# Patient Record
Sex: FEMALE | Race: OTHER | Employment: UNEMPLOYED | URBAN - METROPOLITAN AREA
[De-identification: names, ages, dates, MRNs, and addresses within clinical notes are randomized per-mention and may not be internally consistent; named-entity substitution may affect disease eponyms.]

---

## 2017-05-22 ENCOUNTER — PATIENT OUTREACH (OUTPATIENT)
Dept: INTERNAL MEDICINE CLINIC | Age: 52
End: 2017-05-22

## 2017-05-22 ENCOUNTER — HOSPITAL ENCOUNTER (OUTPATIENT)
Dept: LAB | Age: 52
Discharge: HOME | End: 2017-05-22
Payer: MEDICARE

## 2017-05-22 ENCOUNTER — OFFICE VISIT (OUTPATIENT)
Dept: INTERNAL MEDICINE CLINIC | Age: 52
End: 2017-05-22

## 2017-05-22 VITALS
WEIGHT: 150 LBS | TEMPERATURE: 97.7 F | HEART RATE: 67 BPM | SYSTOLIC BLOOD PRESSURE: 134 MMHG | DIASTOLIC BLOOD PRESSURE: 84 MMHG | HEIGHT: 62 IN | OXYGEN SATURATION: 99 % | BODY MASS INDEX: 27.6 KG/M2 | RESPIRATION RATE: 18 BRPM

## 2017-05-22 DIAGNOSIS — R76.8 ANTI-TPO ANTIBODIES PRESENT: ICD-10-CM

## 2017-05-22 DIAGNOSIS — G43.909 MIGRAINE WITHOUT STATUS MIGRAINOSUS, NOT INTRACTABLE, UNSPECIFIED MIGRAINE TYPE: ICD-10-CM

## 2017-05-22 DIAGNOSIS — R73.9 HYPERGLYCEMIA: ICD-10-CM

## 2017-05-22 DIAGNOSIS — E55.9 VITAMIN D DEFICIENCY: ICD-10-CM

## 2017-05-22 DIAGNOSIS — E78.5 HYPERLIPIDEMIA, UNSPECIFIED HYPERLIPIDEMIA TYPE: ICD-10-CM

## 2017-05-22 DIAGNOSIS — E03.4 HYPOTHYROIDISM DUE TO ACQUIRED ATROPHY OF THYROID: ICD-10-CM

## 2017-05-22 DIAGNOSIS — Z01.419 ROUTINE GYNECOLOGICAL EXAMINATION: ICD-10-CM

## 2017-05-22 DIAGNOSIS — Z98.2 VP (VENTRICULOPERITONEAL) SHUNT STATUS: Primary | ICD-10-CM

## 2017-05-22 PROCEDURE — 82306 VITAMIN D 25 HYDROXY: CPT

## 2017-05-22 PROCEDURE — 84439 ASSAY OF FREE THYROXINE: CPT

## 2017-05-22 PROCEDURE — 83036 HEMOGLOBIN GLYCOSYLATED A1C: CPT

## 2017-05-22 PROCEDURE — 84443 ASSAY THYROID STIM HORMONE: CPT

## 2017-05-22 PROCEDURE — 80061 LIPID PANEL: CPT

## 2017-05-22 PROCEDURE — 80053 COMPREHEN METABOLIC PANEL: CPT

## 2017-05-22 PROCEDURE — 85025 COMPLETE CBC W/AUTO DIFF WBC: CPT

## 2017-05-22 NOTE — MR AVS SNAPSHOT
Visit Information Date & Time Provider Department Dept. Phone Encounter #  
 5/22/2017 10:30 AM MD [facility] [phone] [id number] Follow-up Instructions Return in about 6 months (around 11/22/2017), or if symptoms worsen or fail to improve, for blood pressure. Your Appointments 6/6/2017  2:00 PM  
PHYSICAL PRE OP with MD Kimber [facility] Appt Note: Complete Physical  
 [address] [facility] [address]  
[place] [address] [facility], [place], [facility] [address] Upcoming Health Maintenance Date Due  
 PAP AKA CERVICAL CYTOLOGY 1/1/2016 INFLUENZA AGE 9 TO ADULT 8/1/2017 DTaP/Tdap/Td series (2 - Td) 5/11/2021 Allergies as of 5/22/2017  Review Complete On: 5/22/2017 By: Luke Garcia MD  
  
 Severity Noted Reaction Type Reactions Pcn [Penicillins] High 05/11/2011    Unknown (comments) Pt does not remember the reaction and she has taken amoxil without any problems Sulfa (Sulfonamide Antibiotics)  08/16/2010    Other (comments) Not sure Current Immunizations  Reviewed on 4/30/2015 Name Date Influenza High Dose Vaccine PF 9/15/2016, 9/22/2015 Influenza Vaccine 10/15/2014 TDAP Vaccine 5/11/2011 Not reviewed this visit You Were Diagnosed With   
  
 Codes Comments  (ventriculoperitoneal) shunt status    -  Primary ICD-10-CM: Z98.2 ICD-9-CM: V45.2 Vitamin D deficiency     ICD-10-CM: E55.9 ICD-9-CM: 268.9 Anti-TPO antibodies present     ICD-10-CM: R76.8 ICD-9-CM: 795.79 Migraine without status migrainosus, not intractable, unspecified migraine type     ICD-10-CM: G43.909 ICD-9-CM: 346.90 Hyperglycemia     ICD-10-CM: R73.9 ICD-9-CM: 790.29 Hyperlipidemia, unspecified hyperlipidemia type     ICD-10-CM: E78.5 ICD-9-CM: 272.4 Hypothyroidism due to acquired atrophy of thyroid     ICD-10-CM: E03.4 ICD-9-CM: 244.8, 246.8 Routine gynecological examination     ICD-10-CM: Y66.166 ICD-9-CM: V72.31 Vitals BP Pulse Temp Resp Height(growth percentile) Weight(growth percentile) 134/84 67 97.7 °F (36.5 °C) (Oral) 18 5' 2\" (1.575 m) 150 lb (68 kg) SpO2 BMI OB Status Smoking Status 99% 27.44 kg/m2 Having regular periods Never Smoker Vitals History BMI and BSA Data Body Mass Index Body Surface Area  
 27.44 kg/m 2 1.72 m 2 Preferred Pharmacy Pharmacy Name Phone [facility] [address] [place] [address], [address] [place] [address] [phone] Your Updated Medication List  
  
   
This list is accurate as of: 5/22/17 11:35 AM.  Always use your most recent med list.  
  
  
  
  
 calcium carb-vit d2-minerals Tab Commonly known as:  CALTRATE PLUS Take 1 Tab by mouth two (2) times daily (with meals). levothyroxine 50 mcg tablet Commonly known as:  SYNTHROID Take 1 Tab by mouth Daily (before breakfast). meclizine 25 mg tablet Commonly known as:  ANTIVERT Take 1 Tab by mouth three (3) times daily as needed. MOTRIN 100 mg tablet Generic drug:  ibuprofen Take 100 mg by mouth every six (6) hours as needed. MULTI-VITAMIN PO Take  by mouth. VITAMIN C 250 mg tablet Generic drug:  ascorbic acid (vitamin C) Take  by mouth. We Performed the Following CBC WITH AUTOMATED DIFF [91700 CPT(R)] HEMOGLOBIN A1C WITH EAG [84172 CPT(R)] LIPID PANEL [90178 CPT(R)] METABOLIC PANEL, COMPREHENSIVE [93929 CPT(R)] REFERRAL TO GYNECOLOGY [REF30 Custom] Comments:  
 Please evaluate patient for GYN care. REFERRAL TO NEUROSURGERY [BSG25 Custom] Comments:  
 Please evaluate patient for hydrocephalus. T4, FREE K898168 CPT(R)] TSH 3RD GENERATION [25095 CPT(R)] VITAMIN D, 25 HYDROXY [id number] CPT(R)] Follow-up Instructions Return in about 6 months (around 11/22/2017), or if symptoms worsen or fail to improve, for blood pressure. Referral Information Referral ID Referred By Referred To  
  
 [id number] [facility] [address] [address] Phone: [phone] Fax: [phone] Visits Status Start Date End Date 1 New Request 5/22/17 5/22/18 If your referral has a status of pending review or denied, additional information will be sent to support the outcome of this decision. Referral ID Referred By Referred To  
 [id number] [address] Visits Status Start Date End Date 1 New Request 5/22/17 5/22/18 If your referral has a status of pending review or denied, additional information will be sent to support the outcome of this decision. Introducing [facility] & HEALTH SERVICES! Dear Jean Claude Foster: Thank you for requesting a Snapshot Interactive account. Our records indicate that you already have an active Snapshot Interactive account. You can access your account anytime at [email or web address] Did you know that you can access your hospital and ER discharge instructions at any time in Snapshot Interactive? You can also review all of your test results from your hospital stay or ER visit. Additional Information If you have questions, please visit the Frequently Asked Questions section of the Snapshot Interactive website at [email or web address] Remember, Snapshot Interactive is NOT to be used for urgent needs. For medical emergencies, dial 911. Now available from your iPhone and Android! Please provide this summary of care documentation to your next provider. Your primary care clinician is listed as [address] If you have any questions after today's visit, please call [phone].

## 2017-05-22 NOTE — PROGRESS NOTES
HISTORY OF PRESENT ILLNESS  Joe Murray is a 54 y.o. female. HPI  Presents for f/u hydrocephalus, thyroid, migraine    Pt reports that she needs to be referred to another neurosurgeon  Dr. Yashira Hernandez and Dr. Arias Zhou both have retired. +weight gain    Pt has been living at [facility], but other residents are elderly and pt apparently has been voicing her opinions and not getting along with staff  Pt reports interest in moving to a group home with younger residents    Pt has a counselor, but not a   Pt's mother is involved as well. Past medical, Social, and Family history reviewed  Medications reviewed and updated. ROS  Complete ROS reviewed and negative or stable except as noted in HPI. Physical Exam   Constitutional: She is oriented to person, place, and time. She appears well-nourished. No distress. Dysarthria    HENT:   Mouth/Throat: Oropharynx is clear and moist.   Shunt in place on right side, nontender   Eyes: EOM are normal. Pupils are equal, round, and reactive to light. No scleral icterus. Neck: Normal range of motion. Neck supple. No JVD present. No thyromegaly present. Right sided shunt palpable and nontender   Cardiovascular: Normal rate, regular rhythm and normal heart sounds. Exam reveals no gallop and no friction rub. No murmur heard. Pulmonary/Chest: Effort normal and breath sounds normal. No respiratory distress. She has no wheezes. She has no rales. Abdominal: Soft. Bowel sounds are normal. She exhibits no distension and no mass. There is no tenderness. There is no guarding. Musculoskeletal: Normal range of motion. She exhibits no edema. Lymphadenopathy:     She has no cervical adenopathy. Neurological: She is alert and oriented to person, place, and time. She exhibits normal muscle tone. Skin: Skin is warm. No rash noted. Psychiatric: She has a normal mood and affect. Nursing note and vitals reviewed. Prior labs reviewed.     ASSESSMENT and PLAN ICD-10-CM ICD-9-CM    1.  (ventriculoperitoneal) shunt status Z98.2 V45.2 REFERRAL TO NEUROSURGERY   2. Vitamin D deficiency E55.9 268.9 VITAMIN D, 25 HYDROXY      cholecalciferol (VITAMIN D3) 1,000 unit tablet   3. Anti-TPO antibodies present R76.8 795.79 T4, FREE      TSH 3RD GENERATION   4. Migraine without status migrainosus, not intractable, unspecified migraine type G43.909 346.90 CBC WITH AUTOMATED DIFF   5. Hyperglycemia R73.9 790.29 HEMOGLOBIN A1C WITH EAG   6. Hyperlipidemia, unspecified hyperlipidemia type E78.5 272.4 LIPID PANEL      METABOLIC PANEL, COMPREHENSIVE   7. Hypothyroidism due to acquired atrophy of thyroid E03.4 244.8 T4, FREE     246.8 TSH 3RD GENERATION   8. Routine gynecological examination Z01.419 V72.31 REFERRAL TO GYNECOLOGY     Follow-up Disposition:  Return in about 6 months (around 11/22/2017), or if symptoms worsen or fail to improve, for blood pressure. results and schedule of future studies reviewed with patient  reviewed diet, exercise and weight  cardiovascular risk and specific lipid/LDL goals reviewed  reviewed medications and side effects in detail   Check labs  Encouraged NS appt - given contact info  To have NN look into assisting pt with residence transition if needed.

## 2017-05-22 NOTE — PROGRESS NOTES
Room 14    Chief Complaint   Patient presents with    Referral / Consult     patient states that she needs to be referred to a new neurosurgeon for hydrocephaly

## 2017-05-23 LAB
25(OH)D3+25(OH)D2 SERPL-MCNC: 24.3 NG/ML (ref 30–100)
ALBUMIN SERPL-MCNC: 4.7 G/DL (ref 3.5–5.5)
ALBUMIN/GLOB SERPL: 1.5 {RATIO} (ref 1.2–2.2)
ALP SERPL-CCNC: 90 IU/L (ref 39–117)
ALT SERPL-CCNC: 17 IU/L (ref 0–32)
AST SERPL-CCNC: 13 IU/L (ref 0–40)
BASOPHILS # BLD AUTO: 0 X10E3/UL (ref 0–0.2)
BASOPHILS NFR BLD AUTO: 0 %
BILIRUB SERPL-MCNC: 0.4 MG/DL (ref 0–1.2)
BUN SERPL-MCNC: 11 MG/DL (ref 6–24)
BUN/CREAT SERPL: 19 (ref 9–23)
CALCIUM SERPL-MCNC: 9.8 MG/DL (ref 8.7–10.2)
CHLORIDE SERPL-SCNC: 99 MMOL/L (ref 96–106)
CHOLEST SERPL-MCNC: 242 MG/DL (ref 100–199)
CO2 SERPL-SCNC: 22 MMOL/L (ref 18–29)
CREAT SERPL-MCNC: 0.57 MG/DL (ref 0.57–1)
EOSINOPHIL # BLD AUTO: 0.2 X10E3/UL (ref 0–0.4)
EOSINOPHIL NFR BLD AUTO: 3 %
ERYTHROCYTE [DISTWIDTH] IN BLOOD BY AUTOMATED COUNT: 12.8 % (ref 12.3–15.4)
EST. AVERAGE GLUCOSE BLD GHB EST-MCNC: 100 MG/DL
GLOBULIN SER CALC-MCNC: 3.1 G/DL (ref 1.5–4.5)
GLUCOSE SERPL-MCNC: 75 MG/DL (ref 65–99)
HBA1C MFR BLD: 5.1 % (ref 4.8–5.6)
HCT VFR BLD AUTO: 41.1 % (ref 34–46.6)
HDLC SERPL-MCNC: 88 MG/DL
HGB BLD-MCNC: 13.6 G/DL (ref 11.1–15.9)
IMM GRANULOCYTES # BLD: 0 X10E3/UL (ref 0–0.1)
IMM GRANULOCYTES NFR BLD: 0 %
LDLC SERPL CALC-MCNC: 142 MG/DL (ref 0–99)
LYMPHOCYTES # BLD AUTO: 1.7 X10E3/UL (ref 0.7–3.1)
LYMPHOCYTES NFR BLD AUTO: 29 %
MCH RBC QN AUTO: 31.3 PG (ref 26.6–33)
MCHC RBC AUTO-ENTMCNC: 33.1 G/DL (ref 31.5–35.7)
MCV RBC AUTO: 95 FL (ref 79–97)
MONOCYTES # BLD AUTO: 0.4 X10E3/UL (ref 0.1–0.9)
MONOCYTES NFR BLD AUTO: 6 %
NEUTROPHILS # BLD AUTO: 3.6 X10E3/UL (ref 1.4–7)
NEUTROPHILS NFR BLD AUTO: 62 %
PLATELET # BLD AUTO: 377 X10E3/UL (ref 150–379)
POTASSIUM SERPL-SCNC: 4.3 MMOL/L (ref 3.5–5.2)
PROT SERPL-MCNC: 7.8 G/DL (ref 6–8.5)
RBC # BLD AUTO: 4.35 X10E6/UL (ref 3.77–5.28)
SODIUM SERPL-SCNC: 140 MMOL/L (ref 134–144)
T4 FREE SERPL-MCNC: 1.27 NG/DL (ref 0.82–1.77)
TRIGL SERPL-MCNC: 58 MG/DL (ref 0–149)
TSH SERPL DL<=0.005 MIU/L-ACNC: 4.24 UIU/ML (ref 0.45–4.5)
VLDLC SERPL CALC-MCNC: 12 MG/DL (ref 5–40)
WBC # BLD AUTO: 5.9 X10E3/UL (ref 3.4–10.8)

## 2017-05-23 RX ORDER — MELATONIN
1000 DAILY
Qty: 30 TAB | Refills: 11 | Status: SHIPPED | OUTPATIENT
Start: 2017-05-23 | End: 2018-08-22 | Stop reason: SDUPTHER

## 2017-06-06 ENCOUNTER — OFFICE VISIT (OUTPATIENT)
Dept: INTERNAL MEDICINE CLINIC | Age: 52
End: 2017-06-06

## 2017-06-06 VITALS
RESPIRATION RATE: 16 BRPM | HEART RATE: 74 BPM | TEMPERATURE: 97.7 F | BODY MASS INDEX: 27.57 KG/M2 | SYSTOLIC BLOOD PRESSURE: 125 MMHG | HEIGHT: 62 IN | OXYGEN SATURATION: 100 % | DIASTOLIC BLOOD PRESSURE: 74 MMHG | WEIGHT: 149.8 LBS

## 2017-06-06 DIAGNOSIS — Z00.00 ROUTINE GENERAL MEDICAL EXAMINATION AT A HEALTH CARE FACILITY: Primary | ICD-10-CM

## 2017-06-06 DIAGNOSIS — F32.A ANXIETY AND DEPRESSION: ICD-10-CM

## 2017-06-06 DIAGNOSIS — R39.15 URINARY URGENCY: ICD-10-CM

## 2017-06-06 DIAGNOSIS — Z12.31 ENCOUNTER FOR SCREENING MAMMOGRAM FOR BREAST CANCER: ICD-10-CM

## 2017-06-06 DIAGNOSIS — F41.9 ANXIETY AND DEPRESSION: ICD-10-CM

## 2017-06-06 DIAGNOSIS — E78.5 HYPERLIPIDEMIA, UNSPECIFIED HYPERLIPIDEMIA TYPE: ICD-10-CM

## 2017-06-06 DIAGNOSIS — Z13.39 SCREENING FOR ALCOHOLISM: ICD-10-CM

## 2017-06-06 DIAGNOSIS — E03.4 HYPOTHYROIDISM DUE TO ACQUIRED ATROPHY OF THYROID: ICD-10-CM

## 2017-06-06 DIAGNOSIS — R15.2 RECTAL URGENCY: ICD-10-CM

## 2017-06-06 DIAGNOSIS — Z98.2 VP (VENTRICULOPERITONEAL) SHUNT STATUS: ICD-10-CM

## 2017-06-06 DIAGNOSIS — E55.9 VITAMIN D DEFICIENCY: ICD-10-CM

## 2017-06-06 RX ORDER — ROSUVASTATIN CALCIUM 5 MG/1
5 TABLET, COATED ORAL
Qty: 30 TAB | Refills: 5 | Status: SHIPPED | OUTPATIENT
Start: 2017-06-06 | End: 2018-01-17 | Stop reason: SDUPTHER
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
## 2017-06-06 NOTE — PROGRESS NOTES
HISTORY OF PRESENT ILLNESS  Margy Soulier is a 54 y.o. female. HPI  Presents for f/u bowel, bladder concerns    Pt reports both bowel and bladder urgency  Frequent urinary incontinence  Wearing diaper/undergarment. Not constipated. Regular daily BMs - soft, formed consistency  Pt denies diarrhea  But, at times feels rectal urgency     Pt acknowledges some depressed mood  No SI      Past medical, Social, and Family history reviewed  Medications reviewed and updated. ROS  Complete ROS reviewed and negative or stable except as noted in HPI. Physical Exam   Constitutional: She is oriented to person, place, and time. She appears well-nourished. No distress. Dysarthria    HENT:   Mouth/Throat: Oropharynx is clear and moist.   Shunt in place on right side, nontender   Eyes: EOM are normal. Pupils are equal, round, and reactive to light. No scleral icterus. Neck: Normal range of motion. Neck supple. No JVD present. No thyromegaly present. Right sided shunt palpable and nontender   Cardiovascular: Normal rate, regular rhythm and normal heart sounds. Exam reveals no gallop and no friction rub. No murmur heard. Pulmonary/Chest: Effort normal and breath sounds normal. No respiratory distress. She has no wheezes. She has no rales. Abdominal: Soft. Bowel sounds are normal. She exhibits no distension and no mass. There is no tenderness. There is no rebound and no guarding. Musculoskeletal: Normal range of motion. She exhibits no edema. Lymphadenopathy:     She has no cervical adenopathy. Neurological: She is alert and oriented to person, place, and time. She exhibits normal muscle tone. Skin: Skin is warm. No rash noted. Psychiatric: She has a normal mood and affect. Nursing note and vitals reviewed. Prior labs reviewed. ASSESSMENT and PLAN  ?neurogenic vs pelvic floor dysfunction    ICD-10-CM ICD-9-CM    1. Rectal urgency R15.2 787.63    2.  Hyperlipidemia, unspecified hyperlipidemia type E78.5 272.4 rosuvastatin (CRESTOR) 5 mg tablet   3.  (ventriculoperitoneal) shunt status Z98.2 V45.2    4. Hypothyroidism due to acquired atrophy of thyroid E03.4 244.8      246.8    5. Vitamin D deficiency E55.9 268.9    6. Urinary urgency R39.15 788.63    7. Anxiety and depression F41.9 300.00     F32.9 311    8. Routine general medical examination at a health care facility Z00.00 V70.0    9. Screening for alcoholism Z13.89 V79.1    10. Encounter for screening mammogram for breast cancer Z12.31 V76.12 IKRT MAMMO BI SCREENING INCL CAD     Follow-up Disposition:  Return in about 2 months (around 8/6/2017), or if symptoms worsen or fail to improve, for thyroid, cholesterol.   results and schedule of future studies reviewed with patient  reviewed diet, exercise and weight    cardiovascular risk and specific lipid/LDL goals reviewed  reviewed medications and side effects in detail   See NS first  Consider pelvic, urogyn referral to Dr Dee Escalante  Pt unwilling to change diet - agrees to start low dose statin  Pt declines SSRI

## 2017-06-06 NOTE — MR AVS SNAPSHOT
Visit Information Date & Time Provider Department Dept. Phone Encounter #  
 6/6/2017  2:00 PM Thompson Chacon, [address] [place], [place] and Internal Medicine [phone] [id number] Follow-up Instructions Return in about 2 months (around 8/6/2017), or if symptoms worsen or fail to improve, for thyroid, cholesterol. Your Appointments 11/30/2017  9:30 AM  
ROUTINE CARE with Thompson Chacon MD  
[facility] Appt Note: blood pressure [address] [facility] [address]  
[place] [address] [place] [address] Upcoming Health Maintenance Date Due  
 PAP AKA CERVICAL CYTOLOGY 1/1/2016 INFLUENZA AGE 9 TO ADULT 8/1/2017 DTaP/Tdap/Td series (2 - Td) 5/11/2021 Allergies as of 6/6/2017  Review Complete On: 6/6/2017 By: Thompson Chacon MD  
  
 Severity Noted Reaction Type Reactions Pcn [Penicillins] High 05/11/2011    Unknown (comments) Pt does not remember the reaction and she has taken amoxil without any problems Sulfa (Sulfonamide Antibiotics)  08/16/2010    Other (comments) Not sure Current Immunizations  Reviewed on 6/6/2017 Name Date Influenza High Dose Vaccine PF 9/15/2016, 9/22/2015 Influenza Vaccine 10/15/2014 TDAP Vaccine 5/11/2011 Reviewed by Thompson Chacon MD on 6/6/2017 at  3:14 PM  
You Were Diagnosed With   
  
 Codes Comments Urinary urgency    -  Primary ICD-10-CM: R39.15 ICD-9-CM: 037.76 Hyperlipidemia, unspecified hyperlipidemia type     ICD-10-CM: E78.5 ICD-9-CM: 272.4  (ventriculoperitoneal) shunt status     ICD-10-CM: Z98.2 ICD-9-CM: V45.2 Hypothyroidism due to acquired atrophy of thyroid     ICD-10-CM: E03.4 ICD-9-CM: 244.8, 246.8 Vitamin D deficiency     ICD-10-CM: E55.9 ICD-9-CM: 268.9 Rectal urgency     ICD-10-CM: R15.2 ICD-9-CM: 787.63   
 Anxiety and depression     ICD-10-CM: F41.9, F32.9 ICD-9-CM: 300.00, 311 Routine general medical examination at a health care facility     ICD-10-CM: Z00.00 ICD-9-CM: V70.0 Screening for alcoholism     ICD-10-CM: Z13.89 ICD-9-CM: V79.1 Encounter for screening mammogram for breast cancer     ICD-10-CM: Z12.31 
ICD-9-CM: V76.12 Vitals BP Pulse Temp Resp Height(growth percentile) Weight(growth percentile) 125/74 74 97.7 °F (36.5 °C) (Oral) 16 5' 2\" (1.575 m) 149 lb 12.8 oz (67.9 kg) LMP SpO2 BMI OB Status Smoking Status (LMP Unknown) 100% 27.4 kg/m2 Having regular periods Never Smoker BMI and BSA Data Body Mass Index Body Surface Area  
 27.4 kg/m 2 1.72 m 2 Preferred Pharmacy Pharmacy Name Phone [facility] [address] [place] [address] [phone] Your Updated Medication List  
  
   
This list is accurate as of: 6/6/17  3:19 PM.  Always use your most recent med list.  
  
  
  
  
 calcium carb-vit d2-minerals Tab Commonly known as:  CALTRATE PLUS Take 1 Tab by mouth two (2) times daily (with meals). cholecalciferol 1,000 unit tablet Commonly known as:  VITAMIN D3 Take 1 Tab by mouth daily. levothyroxine 50 mcg tablet Commonly known as:  SYNTHROID Take 1 Tab by mouth Daily (before breakfast). meclizine 25 mg tablet Commonly known as:  ANTIVERT Take 1 Tab by mouth three (3) times daily as needed. MOTRIN 100 mg tablet Generic drug:  ibuprofen Take 100 mg by mouth every six (6) hours as needed. MULTI-VITAMIN PO Take  by mouth. rosuvastatin 5 mg tablet Commonly known as:  CRESTOR Take 1 Tab by mouth nightly. VITAMIN C 250 mg tablet Generic drug:  ascorbic acid (vitamin C) Take  by mouth. Prescriptions Sent to Pharmacy  Refills  
 rosuvastatin (CRESTOR) 5 mg tablet 5  
 Sig: Take 1 Tab by mouth nightly. Class: Normal  
 Pharmacy: [facility] [address], [address] [facility] [address] [facility] #: [phone] Route: Oral  
  
Follow-up Instructions Return in about 2 months (around 8/6/2017), or if symptoms worsen or fail to improve, for thyroid, cholesterol. To-Do List   
 06/13/2017 Imaging:  [facility] MAMMO BI SCREENING INCL CAD Patient Instructions Raina [address]bons[address] [address] What is a living will? A living will is a legal form you use to write down the kind of care you want at the end of your life. It is used by the health professionals who will treat you if you aren't able to decide for yourself. If you put your wishes in writing, your loved ones and others will know what kind of care you want. They won't need to guess. This can ease your mind and be helpful to others. A living will is not the same as an estate or property will. An estate will explains what you want to happen with your money and property after you die. Is a living will a legal document? A living will is a legal document. Each state has its own laws about living joe. If you move to another state, make sure that your living will is legal in the state where you now live. Or you might use a universal form that has been approved by many states. This kind of form can sometimes be completed and stored online. Your electronic copy will then be available wherever you have a connection to the Internet. In most cases, doctors will respect your wishes even if you have a form from a different state. · You don't need an  to complete a living will. But legal advice can be helpful if your state's laws are unclear, your health history is complicated, or your family can't agree on what should be in your living will. · You can change your living will at any time.  Some people find that their wishes about end-of-life care change as their health changes. · In addition to making a living will, think about completing a medical power of  form. This form lets you name the person you want to make end-of-life treatment decisions for you (your \"health care agent\") if you're not able to. Many hospitals and nursing homes will give you the forms you need to complete a living will and a medical power of . · Your living will is used only if you can't make or communicate decisions for yourself anymore. If you become able to make decisions again, you can accept or refuse any treatment, no matter what you wrote in your living will. · Your state may offer an online registry. This is a place where you can store your living will online so the doctors and nurses who need to treat you can find it right away. What should you think about when creating a living will? Talk about your end-of-life wishes with your family members and your doctor. Let them know what you want. That way the people making decisions for you won't be surprised by your choices. Think about these questions as you make your living will: · Do you know enough about life support methods that might be used? If not, talk to your doctor so you know what might be done if you can't breathe on your own, your heart stops, or you're unable to swallow. · What things would you still want to be able to do after you receive life-support methods? Would you want to be able to walk? To speak? To eat on your own? To live without the help of machines? · If you have a choice, where do you want to be cared for? In your home? At a hospital or nursing home? · Do you want certain Buddhist practices performed if you become very ill? · If you have a choice at the end of your life, where would you prefer to die? At home? In a hospital or nursing home? Somewhere else? · Would you prefer to be buried or cremated? · Do you want your organs to be donated after you die? What should you do with your living will? · Make sure that your family members and your health care agent have copies of your living will. · Give your doctor a copy of your living will to keep in your medical record. If you have more than one doctor, make sure that each one has a copy. · You may want to put a copy of your living will where it can be easily found. Where can you learn more? Go to http://carlos-jono.info/. Enter M009 in the search box to learn more about \"Learning About Living Perroy. \" Current as of: February 24, 2016 Content Version: 11.2 © 0651-6460 Pluto.[employer]. Care instructions adapted under license by [employer] (which disclaims liability or warranty for this information). If you have questions about a medical condition or this instruction, always ask your healthcare professional. [place] [address] any warranty or liability for your use of this information. Introducing [facility] & HEALTH SERVICES! Dear Abilio Cates: Thank you for requesting a Twillion account. Our records indicate that you already have an active Twillion account. You can access your account anytime at [email or web address] Did you know that you can access your hospital and ER discharge instructions at any time in Twillion? You can also review all of your test results from your hospital stay or ER visit. Additional Information If you have questions, please visit the Frequently Asked Questions section of the Twillion website at [email or web address] Remember, Twillion is NOT to be used for urgent needs. For medical emergencies, dial 911. Now available from your iPhone and Android! Please provide this summary of care documentation to your next provider. Your primary care clinician is listed as [address]  If you have any questions after today's visit, please call [phone].

## 2017-06-06 NOTE — PROGRESS NOTES
This is an Initial Medicare Annual Wellness Exam (AWV) (Performed 12 months after IPPE or effective date of Medicare Part B enrollment, Once in a lifetime)    I have reviewed the patient's medical history in detail and updated the computerized patient record. 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 Natacha Mayer Unspecified fetal growth retardation, 1,250-1,499 grams     Unspecified hearing loss     Vitamin D deficiency 2011     (ventriculoperitoneal) shunt status       Past Surgical History:   Procedure Laterality Date   [facility] GYN  [id number]    fibroid embolization    HX OTHER SURGICAL  947025     shunt insertion    HX OTHER SURGICAL  474695    shunt replacement    HX OTHER SURGICAL  512383    shunt revision    HX OTHER SURGICAL  865632    shunt replacement     Current Outpatient Prescriptions   Medication Sig Dispense Refill    rosuvastatin (CRESTOR) 5 mg tablet Take 1 Tab by mouth nightly. 30 Tab 5    levothyroxine (SYNTHROID) 50 mcg tablet Take 1 Tab by mouth Daily (before breakfast). 30 Tab 5    ascorbic acid (VITAMIN C) 250 mg tablet Take  by mouth.  MULTI-VITAMIN PO Take  by mouth.  meclizine (ANTIVERT) 25 mg tablet Take 1 Tab by mouth three (3) times daily as needed. 15 Tab 1    ibuprofen (MOTRIN) 100 mg tablet Take 100 mg by mouth every six (6) hours as needed.  cholecalciferol (VITAMIN D3) 1,000 unit tablet Take 1 Tab by mouth daily. 30 Tab 11    calcium carb-vit d2-minerals (CALTRATE PLUS) Tab Take 1 Tab by mouth two (2) times daily (with meals).  60 Tab 11     Allergies   Allergen Reactions    Pcn [Penicillins] Unknown (comments)     Pt does not remember the reaction and she has taken amoxil without any problems     Sulfa (Sulfonamide Antibiotics) Other (comments)     Not sure      Family History   Problem Relation Age of Onset    Heart Attack Father      Social History   Substance Use Topics    Smoking status: Never Smoker    Smokeless tobacco: Never Used    Alcohol use Not on file     Patient Active Problem List   Diagnosis Code    Dizziness R42     (ventriculoperitoneal) shunt status Z98.2    Unspecified hearing loss H91.90    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    Migraine G43.909    Vitamin D deficiency E55.9    Hypothyroidism E03.9    OCD (obsessive compulsive disorder) F42.9    Hyperlipidemia E78.5    Anxiety and depression F41.9, F32.9         Depression Risk Factor Screening:     PHQ over the last two weeks 6/6/2017   PHQ Not Done -   Little interest or pleasure in doing things Nearly every day   Feeling down, depressed or hopeless Nearly every day   Total Score PHQ 2 6     Pt declines treatment    Alcohol Risk Factor Screening: On any occasion during the past 3 months, have you had more than 3 drinks containing alcohol? No    Do you average more than 7 drinks per week? No    Functional Ability and Level of Safety:     Hearing Loss   moderate - but stable  Seeing audiology    Activities of Daily Living   Partial assistance. Requires assistance with: ambulation in open spaces due to imbalance    Fall Risk     Fall Risk Assessment, last 12 mths 6/6/2017   Able to walk? Yes   Fall in past 12 months? No     Abuse Screen   Patient is not abused    Review of Systems   A comprehensive review of systems was negative except for that written in the HPI.     See other    Physical Examination     No exam data present    Evaluation of Cognitive Function:  Mood/affect:  neutral  Appearance: age appropriate  Family member/caregiver input: NA    Visit Vitals    /74    Pulse 74    Temp 97.7 °F (36.5 °C) (Oral)    Resp 16    Ht 5' 2\" (1.575 m)  Comment: as per chart    Wt 149 lb 12.8 oz (67.9 kg)    LMP  (LMP Unknown)    SpO2 100%    BMI 27.4 kg/m2     General appearance: alert, cooperative, no distress, appears stated age  Lungs: clear to auscultation bilaterally  Heart: regular rate and rhythm, S1, S2 normal, no murmur, click, rub or gallop  Abdomen: soft, non-tender. Bowel sounds normal. No masses,  no organomegaly  Neurologic: Grossly normal, at baseline. Patient Care Team:  Yomaira Ojeda MD as PCP - General (Internal Medicine)  Anthony Reyes MD (Obstetrics & Gynecology)  Claudetta Keener, RN as Ambulatory Care Navigator    Advice/Referrals/Counseling   Education and counseling provided:  Are appropriate based on today's review and evaluation      Assessment/Plan       ICD-10-CM ICD-9-CM    1. Routine general medical examination at a health care facility Z00.00 V70.0    2. Hyperlipidemia, unspecified hyperlipidemia type E78.5 272.4 rosuvastatin (CRESTOR) 5 mg tablet   3.  (ventriculoperitoneal) shunt status Z98.2 V45.2    4. Hypothyroidism due to acquired atrophy of thyroid E03.4 244.8      246.8    5. Vitamin D deficiency E55.9 268.9    6. Urinary urgency R39.15 788.63    7. Rectal urgency R15.2 787.63    8. Anxiety and depression F41.9 300.00     F32.9 311    9. Screening for alcoholism Z13.89 V79.1    10. Encounter for screening mammogram for breast cancer Z12.31 V76.12 [facility] MAMMO BI SCREENING INCL CAD     Follow-up Disposition:  Return in about 2 months (around 8/6/2017), or if symptoms worsen or fail to improve, for thyroid, cholesterol. results and schedule of future studies reviewed with patient  reviewed diet, exercise and weight    cardiovascular risk and specific lipid/LDL goals reviewed  reviewed medications and side effects in detail.

## 2017-06-06 NOTE — PATIENT INSTRUCTIONS
Learning About Living Gonzalo  What is a living will? A living will is a legal form you use to write down the kind of care you want at the end of your life. It is used by the health professionals who will treat you if you aren't able to decide for yourself. If you put your wishes in writing, your loved ones and others will know what kind of care you want. They won't need to guess. This can ease your mind and be helpful to others. A living will is not the same as an estate or property will. An estate will explains what you want to happen with your money and property after you die. Is a living will a legal document? A living will is a legal document. Each state has its own laws about living joe. If you move to another state, make sure that your living will is legal in the state where you now live. Or you might use a universal form that has been approved by many states. This kind of form can sometimes be completed and stored online. Your electronic copy will then be available wherever you have a connection to the Internet. In most cases, doctors will respect your wishes even if you have a form from a different state. · You don't need an  to complete a living will. But legal advice can be helpful if your state's laws are unclear, your health history is complicated, or your family can't agree on what should be in your living will. · You can change your living will at any time. Some people find that their wishes about end-of-life care change as their health changes. · In addition to making a living will, think about completing a medical power of  form. This form lets you name the person you want to make end-of-life treatment decisions for you (your \"health care agent\") if you're not able to. Many hospitals and nursing homes will give you the forms you need to complete a living will and a medical power of .   · Your living will is used only if you can't make or communicate decisions for yourself anymore. If you become able to make decisions again, you can accept or refuse any treatment, no matter what you wrote in your living will. · Your state may offer an online registry. This is a place where you can store your living will online so the doctors and nurses who need to treat you can find it right away. What should you think about when creating a living will? Talk about your end-of-life wishes with your family members and your doctor. Let them know what you want. That way the people making decisions for you won't be surprised by your choices. Think about these questions as you make your living will:  · Do you know enough about life support methods that might be used? If not, talk to your doctor so you know what might be done if you can't breathe on your own, your heart stops, or you're unable to swallow. · What things would you still want to be able to do after you receive life-support methods? Would you want to be able to walk? To speak? To eat on your own? To live without the help of machines? · If you have a choice, where do you want to be cared for? In your home? At a hospital or nursing home? · Do you want certain Jainism practices performed if you become very ill? · If you have a choice at the end of your life, where would you prefer to die? At home? In a hospital or nursing home? Somewhere else? · Would you prefer to be buried or cremated? · Do you want your organs to be donated after you die? What should you do with your living will? · Make sure that your family members and your health care agent have copies of your living will. · Give your doctor a copy of your living will to keep in your medical record. If you have more than one doctor, make sure that each one has a copy. · You may want to put a copy of your living will where it can be easily found. Where can you learn more? Go to http://carlos-jono.info/.   Enter L497 in the search box to learn more about \"Learning About Living Perroy. \"  Current as of: February 24, 2016  Content Version: 11.2  © 6393-3274 [employer]. Care instructions adapted under license by [employer] (which disclaims liability or warranty for this information). If you have questions about a medical condition or this instruction, always ask your healthcare professional. [address] any warranty or liability for your use of this information.

## 2017-08-11 ENCOUNTER — HOSPITAL ENCOUNTER (OUTPATIENT)
Dept: LAB | Age: 52
Discharge: HOME | End: 2017-08-11
Payer: MEDICARE

## 2017-08-11 ENCOUNTER — OFFICE VISIT (OUTPATIENT)
Dept: INTERNAL MEDICINE CLINIC | Age: 52
End: 2017-08-11

## 2017-08-11 VITALS
WEIGHT: 148 LBS | DIASTOLIC BLOOD PRESSURE: 73 MMHG | BODY MASS INDEX: 27.23 KG/M2 | HEART RATE: 67 BPM | HEIGHT: 62 IN | SYSTOLIC BLOOD PRESSURE: 119 MMHG | RESPIRATION RATE: 16 BRPM | OXYGEN SATURATION: 99 % | TEMPERATURE: 97.6 F

## 2017-08-11 DIAGNOSIS — E78.5 HYPERLIPIDEMIA, UNSPECIFIED HYPERLIPIDEMIA TYPE: Primary | ICD-10-CM

## 2017-08-11 DIAGNOSIS — E55.9 VITAMIN D DEFICIENCY: ICD-10-CM

## 2017-08-11 DIAGNOSIS — G43.909 MIGRAINE WITHOUT STATUS MIGRAINOSUS, NOT INTRACTABLE, UNSPECIFIED MIGRAINE TYPE: ICD-10-CM

## 2017-08-11 DIAGNOSIS — J30.9 ALLERGIC RHINITIS, UNSPECIFIED ALLERGIC RHINITIS TRIGGER, UNSPECIFIED RHINITIS SEASONALITY: ICD-10-CM

## 2017-08-11 DIAGNOSIS — E03.4 HYPOTHYROIDISM DUE TO ACQUIRED ATROPHY OF THYROID: ICD-10-CM

## 2017-08-11 PROCEDURE — 80053 COMPREHEN METABOLIC PANEL: CPT

## 2017-08-11 PROCEDURE — 82306 VITAMIN D 25 HYDROXY: CPT

## 2017-08-11 PROCEDURE — 85025 COMPLETE CBC W/AUTO DIFF WBC: CPT

## 2017-08-11 PROCEDURE — 80061 LIPID PANEL: CPT

## 2017-08-11 PROCEDURE — 82550 ASSAY OF CK (CPK): CPT

## 2017-08-11 PROCEDURE — 84443 ASSAY THYROID STIM HORMONE: CPT

## 2017-08-11 PROCEDURE — 84439 ASSAY OF FREE THYROXINE: CPT

## 2017-08-11 NOTE — PROGRESS NOTES
HISTORY OF PRESENT ILLNESS  Kimi Mccormack is a 54 y.o. female. HPI  Presents for f/u thyroid, cholesterol    Pt tolerating crestor    Not taking thyroid dose daily - at least every other day    Not taking calcium or vit D    Pt using Chang's vapor tx for upper resp congestion, runny nose    Past medical, Social, and Family history reviewed  Medications reviewed and updated. ROS  Complete ROS reviewed and negative or stable except as noted in HPI. Physical Exam   Constitutional: She is oriented to person, place, and time. She appears well-nourished. No distress. Dysarthria    HENT:   Mouth/Throat: Oropharynx is clear and moist.   Shunt in place on right side, nontender   Eyes: EOM are normal. Pupils are equal, round, and reactive to light. No scleral icterus. Neck: Normal range of motion. Neck supple. No JVD present. No thyromegaly present. Right sided shunt palpable and nontender   Cardiovascular: Normal rate, regular rhythm and normal heart sounds. Exam reveals no gallop and no friction rub. No murmur heard. Pulmonary/Chest: Effort normal and breath sounds normal. No respiratory distress. She has no wheezes. She has no rales. Abdominal: Soft. Bowel sounds are normal. She exhibits no distension. There is no tenderness. Musculoskeletal: Normal range of motion. She exhibits no edema. Lymphadenopathy:     She has no cervical adenopathy. Neurological: She is alert and oriented to person, place, and time. She exhibits normal muscle tone. Skin: Skin is warm. No rash noted. Psychiatric: She has a normal mood and affect. Nursing note and vitals reviewed. Prior labs reviewed. ASSESSMENT and PLAN    ICD-10-CM ICD-9-CM    1. Hyperlipidemia, unspecified hyperlipidemia type E78.5 272.4 CBC WITH AUTOMATED DIFF      CK      LIPID PANEL      METABOLIC PANEL, COMPREHENSIVE   2.  Hypothyroidism due to acquired atrophy of thyroid E03.4 244.8 TSH 3RD GENERATION     246.8 T4, FREE   3. Vitamin D deficiency E55.9 268.9 VITAMIN D, 25 HYDROXY   4. Migraine without status migrainosus, not intractable, unspecified migraine type G43.909 346.90    5. Allergic rhinitis, unspecified allergic rhinitis trigger, unspecified rhinitis seasonality J30.9 477.9      Follow-up Disposition:  Return in about 6 months (around 2/11/2018), or if symptoms worsen or fail to improve, for thyroid, cholesterol. results and schedule of future studies reviewed with patient  reviewed diet, exercise and weight   cardiovascular risk and specific lipid/LDL goals reviewed  reviewed medications and side effects in detail   Recheck labs  Continue current medications, adjust as indicated  Pt declines claritin  GYN appt 8/23/17 scheduled for PAP    Assessment and plan reviewed with pt and questions answered and pt expressed understanding.

## 2017-08-11 NOTE — PROGRESS NOTES
Rm 15    Chief Complaint   Patient presents with    Follow-up     2months thyroid and cholesterol     1. Have you been to the ER, urgent care clinic since your last visit? Hospitalized since your last visit? No    2. Have you seen or consulted any other health care providers outside of the [address] [place] [address] since your last visit? Include any pap smears or colon screening.  No    Health Maintenance Due   Topic Date Due    PAP AKA CERVICAL CYTOLOGY  01/01/2016    INFLUENZA AGE 9 TO ADULT  08/01/2017   '  Pap smear scheduled for end of August 2017

## 2017-08-11 NOTE — MR AVS SNAPSHOT
Visit Information Date & Time Provider Department Dept. Phone Encounter #  
 8/11/2017 10:30 AM Angelo Harris MD [address] [facility] [phone] [id number] Follow-up Instructions Return in about 6 months (around 2/11/2018), or if symptoms worsen or fail to improve, for thyroid, cholesterol. Your Appointments 11/30/2017  9:30 AM  
ROUTINE CARE with Angelo Harris MD  
[facility] Pediatrics and Internal Medicine [facility] Appt Note: blood pressure [address] [place] [address] [address]  
[place] [address] [address] Upcoming Health Maintenance Date Due  
 PAP AKA CERVICAL CYTOLOGY 1/1/2016 INFLUENZA AGE 9 TO ADULT 8/1/2017 DTaP/Tdap/Td series (2 - Td) 5/11/2021 Allergies as of 8/11/2017  Review Complete On: 8/11/2017 By: Angelo Harris MD  
  
 Severity Noted Reaction Type Reactions Pcn [Penicillins] High 05/11/2011    Unknown (comments) Pt does not remember the reaction and she has taken amoxil without any problems Sulfa (Sulfonamide Antibiotics)  08/16/2010    Other (comments) Not sure Current Immunizations  Reviewed on 8/11/2017 Name Date Influenza High Dose Vaccine PF 9/15/2016, 9/22/2015 Influenza Vaccine 10/15/2014 TDAP Vaccine 5/11/2011 Reviewed by Angelo Harris MD on 8/11/2017 at 11:21 AM  
You Were Diagnosed With   
  
 Codes Comments Hyperlipidemia, unspecified hyperlipidemia type    -  Primary ICD-10-CM: E78.5 ICD-9-CM: 272.4 Hypothyroidism due to acquired atrophy of thyroid     ICD-10-CM: E03.4 ICD-9-CM: 244.8, 246.8 Vitamin D deficiency     ICD-10-CM: E55.9 ICD-9-CM: 268.9 Migraine without status migrainosus, not intractable, unspecified migraine type     ICD-10-CM: G43.909 ICD-9-CM: 346.90  Allergic rhinitis, unspecified allergic rhinitis trigger, unspecified rhinitis seasonality     ICD-10-CM: J30.9 ICD-9-CM: 477.9 Vitals BP Pulse Temp Resp Height(growth percentile) Weight(growth percentile) 119/73 (BP 1 Location: Left arm, BP Patient Position: Sitting) 67 97.6 °F (36.4 °C) (Oral) 16 5' 2\" (1.575 m) 148 lb (67.1 kg) SpO2 BMI OB Status Smoking Status 99% 27.07 kg/m2 Having regular periods Never Smoker BMI and BSA Data Body Mass Index Body Surface Area  
 27.07 kg/m 2 1.71 m 2 Preferred Pharmacy Pharmacy Name Phone [facility] [address], [address] [place] [address] [phone] Your Updated Medication List  
  
   
This list is accurate as of: 8/11/17 11:22 AM.  Always use your most recent med list.  
  
  
  
  
 calcium carb-vit d2-minerals Tab Commonly known as:  CALTRATE PLUS Take 1 Tab by mouth two (2) times daily (with meals). cholecalciferol 1,000 unit tablet Commonly known as:  VITAMIN D3 Take 1 Tab by mouth daily. levothyroxine 50 mcg tablet Commonly known as:  SYNTHROID Take 1 Tab by mouth Daily (before breakfast). meclizine 25 mg tablet Commonly known as:  ANTIVERT Take 1 Tab by mouth three (3) times daily as needed. MOTRIN 100 mg tablet Generic drug:  ibuprofen Take 100 mg by mouth every six (6) hours as needed. MULTI-VITAMIN PO Take  by mouth. rosuvastatin 5 mg tablet Commonly known as:  CRESTOR Take 1 Tab by mouth nightly. VITAMIN C 250 mg tablet Generic drug:  ascorbic acid (vitamin C) Take  by mouth. We Performed the Following CBC WITH AUTOMATED DIFF [34104 CPT(R)] CK [id number] CPT(R)] LIPID PANEL [48847 CPT(R)] METABOLIC PANEL, COMPREHENSIVE [20239 CPT(R)] T4, FREE [id number] CPT(R)] TSH 3RD GENERATION [[address]139 CPT(R)] VITAMIN D, 25 HYDROXY [id number] CPT(R)] Follow-up Instructions Return in about 6 months (around 2/11/2018), or if symptoms worsen or fail to improve, for thyroid, cholesterol. Introducing [facility] & HEALTH SERVICES! Dear Shirlene Pagan: Thank you for requesting a [employer] account. Our records indicate that you have previously registered for a [employer] account but its currently inactive. Please call our [employer] support line at [phone]. Additional Information If you have questions, please visit the Frequently Asked Questions section of the [employer] website at [email or web address] Remember, [employer] is NOT to be used for urgent needs. For medical emergencies, dial 911. Now available from your iPhone and Android! Please provide this summary of care documentation to your next provider. Your primary care clinician is listed as [address] If you have any questions after today's visit, please call [phone].

## 2017-08-12 LAB
25(OH)D3+25(OH)D2 SERPL-MCNC: 26.7 NG/ML (ref 30–100)
ALBUMIN SERPL-MCNC: 4.8 G/DL (ref 3.5–5.5)
ALBUMIN/GLOB SERPL: 1.8 {RATIO} (ref 1.2–2.2)
ALP SERPL-CCNC: 78 IU/L (ref 39–117)
ALT SERPL-CCNC: 15 IU/L (ref 0–32)
AST SERPL-CCNC: 8 IU/L (ref 0–40)
BASOPHILS # BLD AUTO: 0 X10E3/UL (ref 0–0.2)
BASOPHILS NFR BLD AUTO: 1 %
BILIRUB SERPL-MCNC: 0.4 MG/DL (ref 0–1.2)
BUN SERPL-MCNC: 7 MG/DL (ref 6–24)
BUN/CREAT SERPL: 10 (ref 9–23)
CALCIUM SERPL-MCNC: 9.6 MG/DL (ref 8.7–10.2)
CHLORIDE SERPL-SCNC: 103 MMOL/L (ref 96–106)
CHOLEST SERPL-MCNC: 147 MG/DL (ref 100–199)
CK SERPL-CCNC: 61 U/L (ref 24–173)
CO2 SERPL-SCNC: 25 MMOL/L (ref 18–29)
CREAT SERPL-MCNC: 0.68 MG/DL (ref 0.57–1)
EOSINOPHIL # BLD AUTO: 0.2 X10E3/UL (ref 0–0.4)
EOSINOPHIL NFR BLD AUTO: 3 %
ERYTHROCYTE [DISTWIDTH] IN BLOOD BY AUTOMATED COUNT: 12.6 % (ref 12.3–15.4)
GLOBULIN SER CALC-MCNC: 2.7 G/DL (ref 1.5–4.5)
GLUCOSE SERPL-MCNC: 82 MG/DL (ref 65–99)
HCT VFR BLD AUTO: 41.7 % (ref 34–46.6)
HDLC SERPL-MCNC: 73 MG/DL
HGB BLD-MCNC: 13.4 G/DL (ref 11.1–15.9)
IMM GRANULOCYTES # BLD: 0 X10E3/UL (ref 0–0.1)
IMM GRANULOCYTES NFR BLD: 0 %
LDLC SERPL CALC-MCNC: 56 MG/DL (ref 0–99)
LYMPHOCYTES # BLD AUTO: 1.7 X10E3/UL (ref 0.7–3.1)
LYMPHOCYTES NFR BLD AUTO: 32 %
MCH RBC QN AUTO: 31.2 PG (ref 26.6–33)
MCHC RBC AUTO-ENTMCNC: 32.1 G/DL (ref 31.5–35.7)
MCV RBC AUTO: 97 FL (ref 79–97)
MONOCYTES # BLD AUTO: 0.4 X10E3/UL (ref 0.1–0.9)
MONOCYTES NFR BLD AUTO: 7 %
NEUTROPHILS # BLD AUTO: 3 X10E3/UL (ref 1.4–7)
NEUTROPHILS NFR BLD AUTO: 57 %
PLATELET # BLD AUTO: 317 X10E3/UL (ref 150–379)
POTASSIUM SERPL-SCNC: 4 MMOL/L (ref 3.5–5.2)
PROT SERPL-MCNC: 7.5 G/DL (ref 6–8.5)
RBC # BLD AUTO: 4.29 X10E6/UL (ref 3.77–5.28)
SODIUM SERPL-SCNC: 141 MMOL/L (ref 134–144)
T4 FREE SERPL-MCNC: 1.36 NG/DL (ref 0.82–1.77)
TRIGL SERPL-MCNC: 90 MG/DL (ref 0–149)
TSH SERPL DL<=0.005 MIU/L-ACNC: 5.08 UIU/ML (ref 0.45–4.5)
VLDLC SERPL CALC-MCNC: 18 MG/DL (ref 5–40)
WBC # BLD AUTO: 5.3 X10E3/UL (ref 3.4–10.8)

## 2018-01-17 DIAGNOSIS — E78.5 HYPERLIPIDEMIA, UNSPECIFIED HYPERLIPIDEMIA TYPE: ICD-10-CM

## 2018-01-17 RX ORDER — ROSUVASTATIN CALCIUM 5 MG/1
TABLET, COATED ORAL
Qty: 30 TAB | Refills: 5 | Status: SHIPPED | OUTPATIENT
Start: 2018-01-17 | End: 2018-09-14 | Stop reason: SDUPTHER

## 2018-02-09 ENCOUNTER — OFFICE VISIT (OUTPATIENT)
Dept: INTERNAL MEDICINE CLINIC | Age: 53
End: 2018-02-09

## 2018-02-09 ENCOUNTER — TELEPHONE (OUTPATIENT)
Dept: INTERNAL MEDICINE CLINIC | Age: 53
End: 2018-02-09

## 2018-02-09 ENCOUNTER — HOSPITAL ENCOUNTER (OUTPATIENT)
Dept: LAB | Age: 53
Discharge: HOME | End: 2018-02-09
Payer: MEDICARE

## 2018-02-09 VITALS
SYSTOLIC BLOOD PRESSURE: 110 MMHG | BODY MASS INDEX: 27.68 KG/M2 | TEMPERATURE: 97.8 F | HEIGHT: 62 IN | OXYGEN SATURATION: 99 % | HEART RATE: 64 BPM | RESPIRATION RATE: 16 BRPM | WEIGHT: 150.4 LBS | DIASTOLIC BLOOD PRESSURE: 67 MMHG

## 2018-02-09 DIAGNOSIS — E78.5 HYPERLIPIDEMIA, UNSPECIFIED HYPERLIPIDEMIA TYPE: ICD-10-CM

## 2018-02-09 DIAGNOSIS — Z12.11 COLON CANCER SCREENING: ICD-10-CM

## 2018-02-09 DIAGNOSIS — J30.9 ALLERGIC RHINITIS, UNSPECIFIED CHRONICITY, UNSPECIFIED SEASONALITY, UNSPECIFIED TRIGGER: ICD-10-CM

## 2018-02-09 DIAGNOSIS — E55.9 VITAMIN D DEFICIENCY: ICD-10-CM

## 2018-02-09 DIAGNOSIS — Z98.2 VP (VENTRICULOPERITONEAL) SHUNT STATUS: ICD-10-CM

## 2018-02-09 DIAGNOSIS — R26.89 IMBALANCE: Primary | ICD-10-CM

## 2018-02-09 DIAGNOSIS — E03.4 HYPOTHYROIDISM DUE TO ACQUIRED ATROPHY OF THYROID: ICD-10-CM

## 2018-02-09 PROCEDURE — 80061 LIPID PANEL: CPT

## 2018-02-09 PROCEDURE — 85025 COMPLETE CBC W/AUTO DIFF WBC: CPT

## 2018-02-09 PROCEDURE — 82306 VITAMIN D 25 HYDROXY: CPT

## 2018-02-09 PROCEDURE — 84443 ASSAY THYROID STIM HORMONE: CPT

## 2018-02-09 PROCEDURE — 84439 ASSAY OF FREE THYROXINE: CPT

## 2018-02-09 PROCEDURE — 82550 ASSAY OF CK (CPK): CPT

## 2018-02-09 PROCEDURE — 80053 COMPREHEN METABOLIC PANEL: CPT

## 2018-02-09 NOTE — MR AVS SNAPSHOT
[address] [facility] [place] [address] 
[phone] Patient: Mary Willams MRN: [id number] :1965 Visit Information Date & Time Provider Department Dept. Phone Encounter #  
 2018  8:30 AM Brian Mathew MD [facility] Pediatrics and Internal Medicine [phone] [id number] Follow-up Instructions Return in about 3 months (around 2018), or if symptoms worsen or fail to improve, for thyroid. Upcoming Health Maintenance Date Due  
 PAP AKA CERVICAL CYTOLOGY 2016 BREAST CANCER SCRN MAMMOGRAM 2017 FOBT Q 1 YEAR AGE 50-75 2017 DTaP/Tdap/Td series (2 - Td) 2021 Allergies as of 2018  Review Complete On: 2018 By: Brian Mathew MD  
  
 Severity Noted Reaction Type Reactions Pcn [Penicillins] High 2011    Unknown (comments) Pt does not remember the reaction and she has taken amoxil without any problems Sulfa (Sulfonamide Antibiotics)  2010    Other (comments) Not sure Current Immunizations  Reviewed on 2017 Name Date Influenza High Dose Vaccine PF 9/15/2016, 2015 Influenza Vaccine 2017, 10/15/2014 TDAP Vaccine 2011 Not reviewed this visit You Were Diagnosed With   
  
 Codes Comments Imbalance    -  Primary ICD-10-CM: R26.89 
ICD-9-CM: 223. 2 Hypothyroidism due to acquired atrophy of thyroid     ICD-10-CM: E03.4 ICD-9-CM: 244.8, 246.8  (ventriculoperitoneal) shunt status     ICD-10-CM: Z98.2 ICD-9-CM: V45.2 Vitamin D deficiency     ICD-10-CM: E55.9 ICD-9-CM: 268.9 Hyperlipidemia, unspecified hyperlipidemia type     ICD-10-CM: E78.5 ICD-9-CM: 272.4 Allergic rhinitis, unspecified chronicity, unspecified seasonality, unspecified trigger     ICD-10-CM: J30.9 ICD-9-CM: 477.9 Colon cancer screening     ICD-10-CM: Z12.11 ICD-9-CM: V76.51 Vitals BP Pulse Temp Resp Height(growth percentile) Weight(growth percentile) 110/67 (BP 1 Location: Left arm, BP Patient Position: Sitting) 64 97.8 °F (36.6 °C) (Oral) 16 5' 2\" (1.575 m) 150 lb 6.4 oz (68.2 kg) SpO2 BMI OB Status Smoking Status 99% 27.51 kg/m2 Having regular periods Never Smoker Vitals History BMI and BSA Data Body Mass Index Body Surface Area  
 27.51 kg/m 2 1.73 m 2 Preferred Pharmacy Pharmacy Name Phone [facility], [address] [place] [address] [phone] Your Updated Medication List  
  
   
This list is accurate as of: 2/9/18  9:11 AM.  Always use your most recent med list.  
  
  
  
  
 calcium carb-vit d2-minerals Tab Commonly known as:  CALTRATE PLUS Take 1 Tab by mouth two (2) times daily (with meals). cholecalciferol 1,000 unit tablet Commonly known as:  VITAMIN D3 Take 1 Tab by mouth daily. levothyroxine 50 mcg tablet Commonly known as:  SYNTHROID Take 1 Tab by mouth Daily (before breakfast). meclizine 25 mg tablet Commonly known as:  ANTIVERT Take 1 Tab by mouth three (3) times daily as needed. MOTRIN 100 mg tablet Generic drug:  ibuprofen Take 100 mg by mouth every six (6) hours as needed. MULTI-VITAMIN PO Take  by mouth. rosuvastatin 5 mg tablet Commonly known as:  CRESTOR  
TAKE 1 TABLET BY MOUTH EVERY NIGHT  
  
 VITAMIN C 250 mg tablet Generic drug:  ascorbic acid (vitamin C) Take  by mouth. We Performed the Following CBC WITH AUTOMATED DIFF [62252 CPT(R)] CK [id number] CPT(R)] LIPID PANEL [23366 CPT(R)] METABOLIC PANEL, COMPREHENSIVE [32083 CPT(R)] REFERRAL TO GASTROENTEROLOGY [HFL87 Custom] REFERRAL TO NEUROSURGERY [LSC39 Custom] T4, FREE [id number] CPT(R)] TSH 3RD GENERATION [23445 CPT(R)] VITAMIN D, 25 HYDROXY [id number] CPT(R)] Follow-up Instructions Return in about 3 months (around 5/9/2018), or if symptoms worsen or fail to improve, for thyroid. To-Do List   
 02/16/2018 Imaging:  CT HEAD WO CONT   
  
 02/16/2018 Imaging:  XR SHUNT SERIES Referral Information Referral ID Referred By Referred To  
  
 [id number] [facility] [address] [address] Phone: [phone] Fax: [phone] Visits Status Start Date End Date 1 New Request 2/9/18 2/9/19 If your referral has a status of pending review or denied, additional information will be sent to support the outcome of this decision. Referral ID Referred By Referred To  
 [id number] [facility] [address] [facility] [phone] [address] Visits Status Start Date End Date 1 New Request 2/9/18 2/9/19 If your referral has a status of pending review or denied, additional information will be sent to support the outcome of this decision. Introducing [facility] & HEALTH SERVICES! Dear Dana De Luna: Thank you for requesting a [employer] account. Our records indicate that you have previously registered for a [employer] account but its currently inactive. Please call our [employer] support line at [phone]. Additional Information If you have questions, please visit the Frequently Asked Questions section of the [employer] website at [email or web address] Remember, [employer] is NOT to be used for urgent needs. For medical emergencies, dial 911. Now available from your iPhone and Android! Please provide this summary of care documentation to your next provider. Your primary care clinician is listed as [address] If you have any questions after today's visit, please call [phone].

## 2018-02-09 NOTE — PROGRESS NOTES
HISTORY OF PRESENT ILLNESS  Peyman Villa is a 50 y.o. female. HPI  Presents for f/u lipids, thyroid, imbalance    Pt is concerned with taking \"too many\" medicines  Not taking the thyroid supplement    Pt is taking crestor    Pt reports that she does not want to take meds due to imbalance  Balance reported as worsened   No recent NS visit or imaging    Has not seen NS yet    Pt reports PAP and mammo done at GYN    Past medical, Social, and Family history reviewed  Medications reviewed and updated. ROS  Complete ROS reviewed and negative or stable except as noted in HPI. Physical Exam   Constitutional: She is oriented to person, place, and time. She appears well-nourished. No distress. Dysarthria    HENT:   Mouth/Throat: Oropharynx is clear and moist.   Shunt in place on right side, nontender   Eyes: EOM are normal. Pupils are equal, round, and reactive to light. No scleral icterus. Neck: Normal range of motion. Neck supple. No JVD present. No thyromegaly present. Right sided shunt palpable and nontender   Cardiovascular: Normal rate, regular rhythm and normal heart sounds. Exam reveals no gallop and no friction rub. No murmur heard. Pulmonary/Chest: Effort normal and breath sounds normal. No respiratory distress. She has no wheezes. She has no rales. Abdominal: Soft. Bowel sounds are normal. She exhibits no distension and no mass. There is no tenderness. There is no guarding. Musculoskeletal: Normal range of motion. She exhibits no edema. Lymphadenopathy:     She has no cervical adenopathy. Neurological: She is alert and oriented to person, place, and time. She exhibits normal muscle tone. Skin: Skin is warm. No rash noted. Psychiatric: She has a normal mood and affect. Nursing note and vitals reviewed. Prior labs reviewed.   Reviewed prior imaging reports    ASSESSMENT and PLAN    ICD-10-CM ICD-9-CM    1. Imbalance R26.89 781.2 CT HEAD WO CONT      XR SHUNT SERIES      REFERRAL TO NEUROSURGERY   2. Hypothyroidism due to acquired atrophy of thyroid E03.4 244.8 T4, FREE     246.8 TSH 3RD GENERATION   3.  (ventriculoperitoneal) shunt status Z98.2 V45.2 CT HEAD WO CONT      XR SHUNT SERIES      REFERRAL TO NEUROSURGERY   4. Vitamin D deficiency E55.9 268.9 VITAMIN D, 25 HYDROXY   5. Hyperlipidemia, unspecified hyperlipidemia type E78.5 272.4 CBC WITH AUTOMATED DIFF      CK      LIPID PANEL      METABOLIC PANEL, COMPREHENSIVE   6. Allergic rhinitis, unspecified chronicity, unspecified seasonality, unspecified trigger J30.9 477.9    7. Colon cancer screening Z12.11 V76.51 REFERRAL TO GASTROENTEROLOGY     Follow-up Disposition:  Return in about 3 months (around 5/9/2018), or if symptoms worsen or fail to improve, for thyroid.    results and schedule of future studies reviewed with patient  reviewed diet, exercise and weight   cardiovascular risk and specific lipid/LDL goals reviewed  reviewed medications and side effects in detail   Head CT and  shunt series  Fasting labs  Likely needs to resume thyroid supplement  Counseled that imbalance is unlikely related to thyroid supplement  Ref to GI for colon Ca screening  Get GYN records re: PAP and mammogram

## 2018-02-09 NOTE — PROGRESS NOTES
Rm 14    Chief Complaint   Patient presents with    Follow-up     thyroid and cholesterol   pt is fasting    1. Have you been to the ER, urgent care clinic since your last visit? Hospitalized since your last visit? No    2. Have you seen or consulted any other health care providers outside of the [address] [place] [address] since your last visit? Include any pap smears or colon screening.  No    Health Maintenance Due   Topic Date Due    PAP AKA CERVICAL CYTOLOGY  01/01/2016    Influenza Age 5 to Adult  08/01/2017    BREAST CANCER SCRN MAMMOGRAM  12/24/2017    FOBT Q 1 YEAR AGE 50-75  12/24/2017   flu vaccine done at [facility] pharmacy, will verify date

## 2018-02-10 LAB
25(OH)D3+25(OH)D2 SERPL-MCNC: 31 NG/ML (ref 30–100)
ALBUMIN SERPL-MCNC: 4.6 G/DL (ref 3.5–5.5)
ALBUMIN/GLOB SERPL: 1.6 {RATIO} (ref 1.2–2.2)
ALP SERPL-CCNC: 82 IU/L (ref 39–117)
ALT SERPL-CCNC: 11 IU/L (ref 0–32)
AST SERPL-CCNC: 7 IU/L (ref 0–40)
BASOPHILS # BLD AUTO: 0 X10E3/UL (ref 0–0.2)
BASOPHILS NFR BLD AUTO: 0 %
BILIRUB SERPL-MCNC: 0.4 MG/DL (ref 0–1.2)
BUN SERPL-MCNC: 11 MG/DL (ref 6–24)
BUN/CREAT SERPL: 16 (ref 9–23)
CALCIUM SERPL-MCNC: 10 MG/DL (ref 8.7–10.2)
CHLORIDE SERPL-SCNC: 104 MMOL/L (ref 96–106)
CHOLEST SERPL-MCNC: 159 MG/DL (ref 100–199)
CK SERPL-CCNC: 51 U/L (ref 24–173)
CO2 SERPL-SCNC: 25 MMOL/L (ref 18–29)
CREAT SERPL-MCNC: 0.68 MG/DL (ref 0.57–1)
EOSINOPHIL # BLD AUTO: 0.1 X10E3/UL (ref 0–0.4)
EOSINOPHIL NFR BLD AUTO: 2 %
ERYTHROCYTE [DISTWIDTH] IN BLOOD BY AUTOMATED COUNT: 12.6 % (ref 12.3–15.4)
GLOBULIN SER CALC-MCNC: 2.9 G/DL (ref 1.5–4.5)
GLUCOSE SERPL-MCNC: 92 MG/DL (ref 65–99)
HCT VFR BLD AUTO: 40.9 % (ref 34–46.6)
HDLC SERPL-MCNC: 73 MG/DL
HGB BLD-MCNC: 13.2 G/DL (ref 11.1–15.9)
IMM GRANULOCYTES # BLD: 0 X10E3/UL (ref 0–0.1)
IMM GRANULOCYTES NFR BLD: 0 %
LDLC SERPL CALC-MCNC: 76 MG/DL (ref 0–99)
LYMPHOCYTES # BLD AUTO: 1.8 X10E3/UL (ref 0.7–3.1)
LYMPHOCYTES NFR BLD AUTO: 26 %
MCH RBC QN AUTO: 30.4 PG (ref 26.6–33)
MCHC RBC AUTO-ENTMCNC: 32.3 G/DL (ref 31.5–35.7)
MCV RBC AUTO: 94 FL (ref 79–97)
MONOCYTES # BLD AUTO: 0.6 X10E3/UL (ref 0.1–0.9)
MONOCYTES NFR BLD AUTO: 9 %
NEUTROPHILS # BLD AUTO: 4.3 X10E3/UL (ref 1.4–7)
NEUTROPHILS NFR BLD AUTO: 63 %
PLATELET # BLD AUTO: 324 X10E3/UL (ref 150–379)
POTASSIUM SERPL-SCNC: 4.4 MMOL/L (ref 3.5–5.2)
PROT SERPL-MCNC: 7.5 G/DL (ref 6–8.5)
RBC # BLD AUTO: 4.34 X10E6/UL (ref 3.77–5.28)
SODIUM SERPL-SCNC: 144 MMOL/L (ref 134–144)
T4 FREE SERPL-MCNC: 1.28 NG/DL (ref 0.82–1.77)
TRIGL SERPL-MCNC: 50 MG/DL (ref 0–149)
TSH SERPL DL<=0.005 MIU/L-ACNC: 5.91 UIU/ML (ref 0.45–4.5)
VLDLC SERPL CALC-MCNC: 10 MG/DL (ref 5–40)
WBC # BLD AUTO: 6.8 X10E3/UL (ref 3.4–10.8)

## 2018-02-16 DIAGNOSIS — E03.9 ACQUIRED HYPOTHYROIDISM: ICD-10-CM

## 2018-02-16 RX ORDER — LEVOTHYROXINE SODIUM 50 UG/1
50 TABLET ORAL
Qty: 30 TAB | Refills: 5 | Status: SHIPPED | OUTPATIENT
Start: 2018-02-16 | End: 2018-04-27 | Stop reason: SDUPTHER

## 2018-02-21 NOTE — TELEPHONE ENCOUNTER
Call placed to Dr. Patel November office. Per Shirley patient is scheduled to se Dr. King Orellana with [facility].

## 2018-02-21 NOTE — TELEPHONE ENCOUNTER
----- Message from Zari Villar sent at 2/20/2018  4:59 PM EST -----  Regarding: Dr. Castillo Diss / Telephone  Contact: [phone]  Pt requested a return call regarding another recommendation/ referral to a Neurosurgeon for a CAT Scan and Xray. Previous recommendation is not accepting pts.

## 2018-02-21 NOTE — TELEPHONE ENCOUNTER
Detailed message left on voicemail in regards to having an appointment with Dr. Elizabeth Chen and to return a phone call to the office if needed.

## 2018-02-21 NOTE — TELEPHONE ENCOUNTER
Please contact Dr. Yuan Stevens's office and clarify whether another neurosurgeon in that practice is accepting new patients and takes pt's insurance. If so, assist pt in scheduling an appt.

## 2018-04-19 DIAGNOSIS — Z12.31 ENCOUNTER FOR SCREENING MAMMOGRAM FOR BREAST CANCER: ICD-10-CM

## 2018-04-27 DIAGNOSIS — E03.9 ACQUIRED HYPOTHYROIDISM: ICD-10-CM

## 2018-04-27 RX ORDER — LEVOTHYROXINE SODIUM 50 UG/1
TABLET ORAL
Qty: 90 TAB | Refills: 1 | Status: SHIPPED | OUTPATIENT
Start: 2018-04-27 | End: 2019-02-13 | Stop reason: SDUPTHER

## 2018-05-09 ENCOUNTER — OFFICE VISIT (OUTPATIENT)
Dept: INTERNAL MEDICINE CLINIC | Age: 53
End: 2018-05-09

## 2018-05-09 VITALS
RESPIRATION RATE: 16 BRPM | OXYGEN SATURATION: 100 % | DIASTOLIC BLOOD PRESSURE: 81 MMHG | HEIGHT: 62 IN | SYSTOLIC BLOOD PRESSURE: 129 MMHG | HEART RATE: 74 BPM | BODY MASS INDEX: 27.2 KG/M2 | TEMPERATURE: 97.7 F | WEIGHT: 147.8 LBS

## 2018-05-09 DIAGNOSIS — Z98.2 VP (VENTRICULOPERITONEAL) SHUNT STATUS: ICD-10-CM

## 2018-05-09 DIAGNOSIS — E78.5 HYPERLIPIDEMIA, UNSPECIFIED HYPERLIPIDEMIA TYPE: ICD-10-CM

## 2018-05-09 DIAGNOSIS — K21.9 GASTROESOPHAGEAL REFLUX DISEASE, ESOPHAGITIS PRESENCE NOT SPECIFIED: ICD-10-CM

## 2018-05-09 DIAGNOSIS — J30.9 ALLERGIC RHINITIS, UNSPECIFIED SEASONALITY, UNSPECIFIED TRIGGER: ICD-10-CM

## 2018-05-09 DIAGNOSIS — F42.9 OBSESSIVE-COMPULSIVE DISORDER, UNSPECIFIED TYPE: ICD-10-CM

## 2018-05-09 DIAGNOSIS — E03.4 HYPOTHYROIDISM DUE TO ACQUIRED ATROPHY OF THYROID: Primary | ICD-10-CM

## 2018-05-09 DIAGNOSIS — G43.909 MIGRAINE WITHOUT STATUS MIGRAINOSUS, NOT INTRACTABLE, UNSPECIFIED MIGRAINE TYPE: ICD-10-CM

## 2018-05-09 RX ORDER — PHENOL/SODIUM PHENOLATE
20 AEROSOL, SPRAY (ML) MUCOUS MEMBRANE DAILY
Qty: 90 TAB | Refills: 3 | Status: SHIPPED | OUTPATIENT
Start: 2018-05-09 | End: 2020-11-17 | Stop reason: SDUPTHER

## 2018-05-09 NOTE — PROGRESS NOTES
Rm 15    Chief Complaint   Patient presents with    Follow-up     thyroid    Migraine   pt is fasting    1. Have you been to the ER, urgent care clinic since your last visit? Hospitalized since your last visit? No    2. Have you seen or consulted any other health care providers outside of the [address] [place] [address] since your last visit? Include any pap smears or colon screening.  No    Health Maintenance Due   Topic Date Due    PAP AKA CERVICAL CYTOLOGY  01/01/2016    FOBT Q 1 YEAR AGE 50-75  12/24/2017     Learning Assessment 5/9/2018   PRIMARY LEARNER Patient   HIGHEST LEVEL OF EDUCATION - PRIMARY LEARNER  SOME COLLEGE   BARRIERS PRIMARY LEARNER NONE   CO-LEARNER CAREGIVER No   PRIMARY LANGUAGE ENGLISH   LEARNER PREFERENCE PRIMARY READING   ANSWERED BY patient   RELATIONSHIP SELF

## 2018-05-09 NOTE — PROGRESS NOTES
HPI:  Presents for f/u thyroid, etc    Pt again reports that her living situation is frustrating and she does not get along with other residents. Pt stopped thyroid med since pt had attributed URI sx and GERD to the thyroid med. Pt resumed thyroid supplement 4-5 days ago    No head imaging or NS follow up  Pt cites need for mother to be present at visit with NS as reason  Pt reports she thought she had to see NS and imaging at the same time      Past medical, Social, and Family history reviewed    Prior to Admission medications    Medication Sig Start Date End Date Taking? Authorizing Provider   levothyroxine (SYNTHROID) 50 mcg tablet TAKE 1 TABLET BY MOUTH DAILY BEFORE BREAKFAST 4/27/18  Yes Mouna Romero MD   rosuvastatin (CRESTOR) 5 mg tablet TAKE 1 TABLET BY MOUTH EVERY NIGHT 1/17/18  Yes Mouna Romero MD   MULTI-VITAMIN PO Take  by mouth. Yes Historical Provider   cholecalciferol (VITAMIN D3) 1,000 unit tablet Take 1 Tab by mouth daily. 5/23/17   Mouna Romero MD   calcium carb-vit d2-minerals (CALTRATE PLUS) Tab Take 1 Tab by mouth two (2) times daily (with meals). 5/25/11   Mouna Romeor MD   ascorbic acid (VITAMIN C) 250 mg tablet Take  by mouth. Historical Provider   meclizine (ANTIVERT) 25 mg tablet Take 1 Tab by mouth three (3) times daily as needed. 5/11/11   Mouna Romero MD   ibuprofen (MOTRIN) 100 mg tablet Take 100 mg by mouth every six (6) hours as needed. Historical Provider          ROS  Complete ROS reviewed and negative or stable except as noted in HPI. Physical Exam   Constitutional: She is oriented to person, place, and time. She appears well-nourished. No distress. Dysarthria    HENT:   Mouth/Throat: Oropharynx is clear and moist.   Shunt in place on right side, nontender   Eyes: EOM are normal. Pupils are equal, round, and reactive to light. No scleral icterus. Neck: Normal range of motion. Neck supple. No JVD present. No thyromegaly present. Right sided shunt palpable and nontender   Cardiovascular: Normal rate, regular rhythm and normal heart sounds. Exam reveals no gallop and no friction rub. No murmur heard. Pulmonary/Chest: Effort normal and breath sounds normal. No respiratory distress. She has no wheezes. She has no rales. Abdominal: Soft. Bowel sounds are normal. She exhibits no distension. There is no tenderness. Musculoskeletal: Normal range of motion. She exhibits no edema. Lymphadenopathy:     She has no cervical adenopathy. Neurological: She is alert and oriented to person, place, and time. She exhibits normal muscle tone. Skin: Skin is warm. No rash noted. Psychiatric: She has a normal mood and affect. Nursing note and vitals reviewed. Prior labs reviewed. Assessment/Plan:    ICD-10-CM ICD-9-CM    1. Hypothyroidism due to acquired atrophy of thyroid E03.4 244.8      246.8    2.  (ventriculoperitoneal) shunt status Z98.2 V45.2 XR SHUNT SERIES      CT HEAD WO CONT   3. Migraine without status migrainosus, not intractable, unspecified migraine type G43.909 346.90    4. Obsessive-compulsive disorder, unspecified type F42.9 300.3    5. Hyperlipidemia, unspecified hyperlipidemia type E78.5 272.4    6. Allergic rhinitis, unspecified seasonality, unspecified trigger J30.9 477.9    7. Gastroesophageal reflux disease, esophagitis presence not specified K21.9 530.81 Omeprazole delayed release (PRILOSEC D/R) 20 mg tablet     Follow-up Disposition:  Return in about 2 months (around 7/9/2018), or if symptoms worsen or fail to improve, for thyroid.   results and schedule of future studies reviewed with patient  reviewed diet, exercise and weight  cardiovascular risk and specific lipid/LDL goals reviewed  reviewed medications and side effects in detail  Encouraged imaging done prior to NS visit - re-ordered  Encouraged thyroid supplement   Recheck thyroid after 6-8 weeks on med consistently

## 2018-05-09 NOTE — MR AVS SNAPSHOT
[address]  [address] [place] [address] 
[phone] Patient: Linda Benedict MRN: [id number] :1965 Visit Information Date & Time Provider Department Dept. Phone Encounter #  
 2018 10:45 AM Katerine Mclain MD [facility] [phone] [id number] Follow-up Instructions Return in about 2 months (around 2018), or if symptoms worsen or fail to improve, for thyroid. Upcoming Health Maintenance Date Due  
 PAP AKA CERVICAL CYTOLOGY 2016 FOBT Q 1 YEAR AGE 50-75 2017 MEDICARE YEARLY EXAM 2018 Influenza Age 5 to Adult 2018 BREAST CANCER SCRN MAMMOGRAM 2019 DTaP/Tdap/Td series (2 - Td) 2021 Allergies as of 2018  Review Complete On: 2018 By: Katerine Mclain MD  
  
 Severity Noted Reaction Type Reactions Pcn [Penicillins] High 2011    Unknown (comments) Pt does not remember the reaction and she has taken amoxil without any problems Sulfa (Sulfonamide Antibiotics)  2010    Other (comments) Not sure Current Immunizations  Reviewed on 2017 Name Date Influenza High Dose Vaccine PF 9/15/2016, 2015 Influenza Vaccine 2017, 10/15/2014 TDAP Vaccine 2011 Not reviewed this visit You Were Diagnosed With   
  
 Codes Comments Hypothyroidism due to acquired atrophy of thyroid    -  Primary ICD-10-CM: E03.4 ICD-9-CM: 244.8, 246.8  (ventriculoperitoneal) shunt status     ICD-10-CM: Z98.2 ICD-9-CM: V45.2 Migraine without status migrainosus, not intractable, unspecified migraine type     ICD-10-CM: G43.909 ICD-9-CM: 346.90 Obsessive-compulsive disorder, unspecified type     ICD-10-CM: F42.9 ICD-9-CM: 300.3 Hyperlipidemia, unspecified hyperlipidemia type     ICD-10-CM: E78.5 ICD-9-CM: 272.4 Allergic rhinitis, unspecified seasonality, unspecified trigger     ICD-10-CM: J30.9 ICD-9-CM: 477.9 Vitals BP Pulse Temp Resp Height(growth percentile) Weight(growth percentile) 129/81 (BP 1 Location: Left arm, BP Patient Position: Sitting) 74 97.7 °F (36.5 °C) (Oral) 16 5' 2\" (1.575 m) 147 lb 12.8 oz (67 kg) SpO2 BMI OB Status Smoking Status 100% 27.03 kg/m2 Having regular periods Never Smoker BMI and BSA Data Body Mass Index Body Surface Area  
 27.03 kg/m 2 1.71 m 2 Preferred Pharmacy Pharmacy Name Phone [facility] [address] [place] [address], [address] [place] [address] [phone] Your Updated Medication List  
  
   
This list is accurate as of 5/9/18 12:11 PM.  Always use your most recent med list.  
  
  
  
  
 calcium carb-vit d2-minerals Tab Commonly known as:  CALTRATE PLUS Take 1 Tab by mouth two (2) times daily (with meals). cholecalciferol 1,000 unit tablet Commonly known as:  VITAMIN D3 Take 1 Tab by mouth daily. levothyroxine 50 mcg tablet Commonly known as:  SYNTHROID  
TAKE 1 TABLET BY MOUTH DAILY BEFORE BREAKFAST  
  
 meclizine 25 mg tablet Commonly known as:  ANTIVERT Take 1 Tab by mouth three (3) times daily as needed. MOTRIN 100 mg tablet Generic drug:  ibuprofen Take 100 mg by mouth every six (6) hours as needed. MULTI-VITAMIN PO Take  by mouth. rosuvastatin 5 mg tablet Commonly known as:  CRESTOR  
TAKE 1 TABLET BY MOUTH EVERY NIGHT  
  
 VITAMIN C 250 mg tablet Generic drug:  ascorbic acid (vitamin C) Take  by mouth. Follow-up Instructions Return in about 2 months (around 7/9/2018), or if symptoms worsen or fail to improve, for thyroid. To-Do List   
 05/10/2018 Imaging:  XR SHUNT SERIES   
  
 05/16/2018 Imaging:  CT HEAD WO CONT Introducing [facility]! Diana Jefferson introduces CAPPTURE patient portal. Now you can access parts of your medical record, email your doctor's office, and request medication refills online. 1. In your internet browser, go to [email or web address]. [email or web address] 2. Click on the First Time User? Click Here link in the Sign In box. You will see the New Member Sign Up page. 3. Enter your CAPPTURE Access Code exactly as it appears below. You will not need to use this code after youve completed the sign-up process. If you do not sign up before the expiration date, you must request a new code. · CAPPTURE Access Code: [email or web address] Expires: 8/7/2018  9:36 AM 
 
4. Enter the last four digits of your Social Security Number (xxxx) and Date of Birth (mm/dd/yyyy) as indicated and click Submit. You will be taken to the next sign-up page. 5. Create a CAPPTURE ID. This will be your CAPPTURE login ID and cannot be changed, so think of one that is secure and easy to remember. 6. Create a CAPPTURE password. You can change your password at any time. 7. Enter your Password Reset Question and Answer. This can be used at a later time if you forget your password. 8. Enter your e-mail address. You will receive e-mail notification when new information is available in [address] 9. Click Sign Up. You can now view and download portions of your medical record. 10. Click the Download Summary menu link to download a portable copy of your medical information. If you have questions, please visit the Frequently Asked Questions section of the CAPPTURE website. Remember, CAPPTURE is NOT to be used for urgent needs. For medical emergencies, dial 911. Now available from your iPhone and Android! Please provide this summary of care documentation to your next provider. Your primary care clinician is listed as [address] If you have any questions after today's visit, please call [phone].

## 2018-05-17 ENCOUNTER — HOSPITAL ENCOUNTER (OUTPATIENT)
Dept: CT IMAGING | Age: 53
Discharge: HOME | End: 2018-05-17
Attending: INTERNAL MEDICINE
Payer: MEDICARE

## 2018-05-17 ENCOUNTER — HOSPITAL ENCOUNTER (OUTPATIENT)
Dept: GENERAL RADIOLOGY | Age: 53
Discharge: HOME | End: 2018-05-17
Attending: INTERNAL MEDICINE
Payer: MEDICARE

## 2018-05-17 DIAGNOSIS — Z98.2 VP (VENTRICULOPERITONEAL) SHUNT STATUS: ICD-10-CM

## 2018-05-17 PROCEDURE — 70450 CT HEAD/BRAIN W/O DYE: CPT

## 2018-05-17 PROCEDURE — 70250 X-RAY EXAM OF SKULL: CPT

## 2018-08-21 ENCOUNTER — OFFICE VISIT (OUTPATIENT)
Dept: INTERNAL MEDICINE CLINIC | Age: 53
End: 2018-08-21

## 2018-08-21 VITALS
DIASTOLIC BLOOD PRESSURE: 77 MMHG | HEIGHT: 62 IN | SYSTOLIC BLOOD PRESSURE: 120 MMHG | WEIGHT: 152.4 LBS | OXYGEN SATURATION: 94 % | BODY MASS INDEX: 28.05 KG/M2 | HEART RATE: 77 BPM | TEMPERATURE: 96.3 F | RESPIRATION RATE: 16 BRPM

## 2018-08-21 DIAGNOSIS — Z00.00 MEDICARE ANNUAL WELLNESS VISIT, SUBSEQUENT: Primary | ICD-10-CM

## 2018-08-21 DIAGNOSIS — R73.9 HYPERGLYCEMIA: ICD-10-CM

## 2018-08-21 DIAGNOSIS — E78.5 HYPERLIPIDEMIA, UNSPECIFIED HYPERLIPIDEMIA TYPE: ICD-10-CM

## 2018-08-21 DIAGNOSIS — Z12.11 COLON CANCER SCREENING: ICD-10-CM

## 2018-08-21 DIAGNOSIS — E03.4 HYPOTHYROIDISM DUE TO ACQUIRED ATROPHY OF THYROID: ICD-10-CM

## 2018-08-21 DIAGNOSIS — F32.A ANXIETY AND DEPRESSION: ICD-10-CM

## 2018-08-21 DIAGNOSIS — E55.9 VITAMIN D DEFICIENCY: ICD-10-CM

## 2018-08-21 DIAGNOSIS — F41.9 ANXIETY AND DEPRESSION: ICD-10-CM

## 2018-08-21 DIAGNOSIS — G43.909 MIGRAINE WITHOUT STATUS MIGRAINOSUS, NOT INTRACTABLE, UNSPECIFIED MIGRAINE TYPE: ICD-10-CM

## 2018-08-21 NOTE — PROGRESS NOTES
HPI:  Presents for f/u thyroid, etc    Pt takes thyroid med daily per report    Saw GYN in and urogyn in the past year    No new complaints    Pt screens + for depression. Pt states that the bad things in life make her depressed and that nothing will improve it. Pt reports seeing a counselor already. Past medical, Social, and Family history reviewed    Prior to Admission medications    Medication Sig Start Date End Date Taking? Authorizing Provider   Omeprazole delayed release (PRILOSEC D/R) 20 mg tablet Take 1 Tab by mouth daily. 5/9/18  Yes Marcela Jasmine MD   levothyroxine (SYNTHROID) 50 mcg tablet TAKE 1 TABLET BY MOUTH DAILY BEFORE BREAKFAST 4/27/18  Yes Marcela Jasmine MD   rosuvastatin (CRESTOR) 5 mg tablet TAKE 1 TABLET BY MOUTH EVERY NIGHT 1/17/18  Yes Marcela Jasmine MD   ascorbic acid (VITAMIN C) 250 mg tablet Take  by mouth. Yes Historical Provider   MULTI-VITAMIN PO Take  by mouth. Yes Historical Provider   cholecalciferol (VITAMIN D3) 1,000 unit tablet Take 1 Tab by mouth daily. 5/23/17   Marcela Jasmine MD   calcium carb-vit d2-minerals (CALTRATE PLUS) Tab Take 1 Tab by mouth two (2) times daily (with meals). 5/25/11   Marcela Jasmine MD   meclizine (ANTIVERT) 25 mg tablet Take 1 Tab by mouth three (3) times daily as needed. 5/11/11   Marcela Jasmine MD   ibuprofen (MOTRIN) 100 mg tablet Take 100 mg by mouth every six (6) hours as needed. Historical Provider          ROS  Complete ROS reviewed and negative or stable except as noted in HPI. Physical Exam   Constitutional: She is oriented to person, place, and time. She appears well-nourished. No distress. Dysarthria    HENT:   Mouth/Throat: Oropharynx is clear and moist.   Shunt in place on right side, nontender   Eyes: EOM are normal. Pupils are equal, round, and reactive to light. No scleral icterus. Neck: Normal range of motion. Neck supple. No JVD present. No thyromegaly present.    Right sided shunt palpable and nontender   Cardiovascular: Normal rate, regular rhythm and normal heart sounds. Exam reveals no gallop and no friction rub. No murmur heard. Pulmonary/Chest: Effort normal and breath sounds normal. No respiratory distress. She has no wheezes. She has no rales. Abdominal: Soft. Bowel sounds are normal. She exhibits no distension and no mass. There is no tenderness. There is no guarding. Musculoskeletal: Normal range of motion. She exhibits no edema. Lymphadenopathy:     She has no cervical adenopathy. Neurological: She is alert and oriented to person, place, and time. She exhibits normal muscle tone. Skin: Skin is warm. No rash noted. Psychiatric: She has a normal mood and affect. Nursing note and vitals reviewed. Prior labs reviewed. Reviewed prior imaging reports      Assessment/Plan:    ICD-10-CM ICD-9-CM    1. Hypothyroidism due to acquired atrophy of thyroid E03.4 244.8 T4, FREE     246.8 TSH 3RD GENERATION   2. Medicare annual wellness visit, subsequent Z00.00 V70.0    3. Colon cancer screening Z12.11 V76.51 COLOGUARD TEST (FECAL DNA COLORECTAL CANCER SCREENING)   4. Vitamin D deficiency E55.9 268.9 VITAMIN D, 25 HYDROXY   5. Migraine without status migrainosus, not intractable, unspecified migraine type G43.909 346.90 CBC WITH AUTOMATED DIFF   6. Hyperlipidemia, unspecified hyperlipidemia type E78.5 272.4 CK      LIPID PANEL      METABOLIC PANEL, COMPREHENSIVE   7. Anxiety and depression F41.9 300.00     F32.9 311    8. Hyperglycemia R73.9 790.29 HEMOGLOBIN A1C WITH EAG     Follow-up Disposition:  Return in about 6 months (around 2/21/2019), or if symptoms worsen or fail to improve, for thyroid, cholesterol.    results and schedule of future studies reviewed with patient  reviewed diet, exercise and weight   cardiovascular risk and specific lipid/LDL goals reviewed  reviewed medications and side effects in detail  Check labs  Continue current medications   Pt declines med for mood  Encouraged to continue counseling

## 2018-08-21 NOTE — MR AVS SNAPSHOT
[address] [place] [address] E [address] [address] 
[phone] Patient: Anais Leal MRN: [id number] :1965 Visit Information Date & Time Provider Department Dept. Phone Encounter #  
 2018 11:00 AM Nelsy Soliz, [address] [place] [address] and Internal Medicine [phone] [id number] Follow-up Instructions Return in about 6 months (around 2019), or if symptoms worsen or fail to improve, for thyroid, cholesterol. Upcoming Health Maintenance Date Due FOBT Q 1 YEAR AGE 50-75 2017 MEDICARE YEARLY EXAM 2018 Influenza Age 5 to Adult 2018 BREAST CANCER SCRN MAMMOGRAM 2019 PAP AKA CERVICAL CYTOLOGY 2020 DTaP/Tdap/Td series (2 - Td) 2021 Allergies as of 2018  Review Complete On: 2018 By: Nelsy Soliz MD  
  
 Severity Noted Reaction Type Reactions Pcn [Penicillins] High 2011    Unknown (comments) Pt does not remember the reaction and she has taken amoxil without any problems Sulfa (Sulfonamide Antibiotics)  2010    Other (comments) Not sure Current Immunizations  Reviewed on 2018 Name Date Influenza High Dose Vaccine PF 9/15/2016, 2015 Influenza Vaccine 2017, 10/15/2014 TDAP Vaccine 2011 Reviewed by Nelsy Soliz MD on 2018 at 12:54 PM  
You Were Diagnosed With   
  
 Codes Comments Medicare annual wellness visit, subsequent    -  Primary ICD-10-CM: Z00.00 ICD-9-CM: V70.0 Colon cancer screening     ICD-10-CM: Z12.11 ICD-9-CM: V76.51 Vitamin D deficiency     ICD-10-CM: E55.9 ICD-9-CM: 268.9 Migraine without status migrainosus, not intractable, unspecified migraine type     ICD-10-CM: G43.909 ICD-9-CM: 346.90 Hypothyroidism due to acquired atrophy of thyroid     ICD-10-CM: E03.4 ICD-9-CM: 244.8, 246.8 Hyperlipidemia, unspecified hyperlipidemia type     ICD-10-CM: E78.5 ICD-9-CM: 272.4 Anxiety and depression     ICD-10-CM: F41.9, F32.9 ICD-9-CM: 300.00, 311 Hyperglycemia     ICD-10-CM: R73.9 ICD-9-CM: 790.29 Vitals BP Pulse Temp Resp Height(growth percentile) Weight(growth percentile) 120/77 (BP 1 Location: Left arm, BP Patient Position: Sitting) 77 96.3 °F (35.7 °C) (Oral) 16 5' 2\" (1.575 m) 152 lb 6.4 oz (69.1 kg) SpO2 BMI OB Status Smoking Status 94% 27.87 kg/m2 Menopause Never Smoker Vitals History BMI and BSA Data Body Mass Index Body Surface Area  
 27.87 kg/m 2 1.74 m 2 Preferred Pharmacy Pharmacy Name Phone [facility] [address] [place] [address], [address] [place] [address] [phone] Your Updated Medication List  
  
   
This list is accurate as of 8/21/18  1:07 PM.  Always use your most recent med list.  
  
  
  
  
 calcium carb-vit d2-minerals Tab Commonly known as:  CALTRATE PLUS Take 1 Tab by mouth two (2) times daily (with meals). cholecalciferol 1,000 unit tablet Commonly known as:  VITAMIN D3 Take 1 Tab by mouth daily. levothyroxine 50 mcg tablet Commonly known as:  SYNTHROID  
TAKE 1 TABLET BY MOUTH DAILY BEFORE BREAKFAST  
  
 meclizine 25 mg tablet Commonly known as:  ANTIVERT Take 1 Tab by mouth three (3) times daily as needed. MOTRIN 100 mg tablet Generic drug:  ibuprofen Take 100 mg by mouth every six (6) hours as needed. MULTI-VITAMIN PO Take  by mouth. Omeprazole delayed release 20 mg tablet Commonly known as:  PRILOSEC D/R Take 1 Tab by mouth daily. rosuvastatin 5 mg tablet Commonly known as:  CRESTOR  
TAKE 1 TABLET BY MOUTH EVERY NIGHT  
  
 VITAMIN C 250 mg tablet Generic drug:  ascorbic acid (vitamin C) Take  by mouth. We Performed the Following CBC WITH AUTOMATED DIFF [02617 CPT(R)] CK [id number] CPT(R)] COLOGUARD TEST (FECAL DNA COLORECTAL CANCER SCREENING) [75119 CPT(R)] HEMOGLOBIN A1C WITH EAG [62139 CPT(R)] LIPID PANEL [92862 CPT(R)] METABOLIC PANEL, COMPREHENSIVE [98660 CPT(R)] T4, FREE [id number] CPT(R)] TSH 3RD GENERATION [89151 CPT(R)] VITAMIN D, 25 HYDROXY [id number] CPT(R)] Follow-up Instructions Return in about 6 months (around 2/21/2019), or if symptoms worsen or fail to improve, for thyroid, cholesterol. Patient Instructions Medicare Wellness Visit, Female The best way to live healthy is to have a lifestyle where you eat a well-balanced diet, exercise regularly, limit alcohol use, and quit all forms of tobacco/nicotine, if applicable. Regular preventive services are another way to keep healthy. Preventive services (vaccines, screening tests, monitoring & exams) can help personalize your care plan, which helps you manage your own care. Screening tests can find health problems at the earliest stages, when they are easiest to treat. Rishabh Emery follows the current, evidence-based guidelines published by the [facility] (USPSTF) when recommending preventive services for our patients. Because we follow these guidelines, sometimes recommendations change over time as research supports it. (For example, mammograms used to be recommended annually. Even though Medicare will still pay for an annual mammogram, the newer guidelines recommend a mammogram every two years for women of average risk.) Of course, you and your doctor may decide to screen more often for some diseases, based on your risk and your health status. Preventive services for you include: - Medicare offers their members a free annual wellness visit, which is time for you and your primary care provider to discuss and plan for your preventive service needs. Take advantage of this benefit every year! -All adults over the age of 72 should receive the recommended pneumonia vaccines. Current USPSTF guidelines recommend a series of two vaccines for the best pneumonia protection.  
-All adults should have a flu vaccine yearly and a tetanus vaccine every 10 years. All adults age 61 and older should receive a shingles vaccine once in their lifetime.   
-A bone mass density test is recommended when a woman turns 65 to screen for osteoporosis. This test is only recommended one time, as a screening. Some providers will use this same test as a disease monitoring tool if you already have osteoporosis. -All adults age 38-68 who are overweight should have a diabetes screening test once every three years.  
-Other screening tests and preventive services for persons with diabetes include: an eye exam to screen for diabetic retinopathy, a kidney function test, a foot exam, and stricter control over your cholesterol.  
-Cardiovascular screening for adults with routine risk involves an electrocardiogram (ECG) at intervals determined by your doctor.  
-Colorectal cancer screenings should be done for adults age 54-65 with no increased risk factors for colorectal cancer. There are a number of acceptable methods of screening for this type of cancer. Each test has its own benefits and drawbacks. Discuss with your doctor what is most appropriate for you during your annual wellness visit. The different tests include: colonoscopy (considered the best screening method), a fecal occult blood test, a fecal DNA test, and sigmoidoscopy. -Breast cancer screenings are recommended every other year for women of normal risk, age 54-69. 
-Cervical cancer screenings for women over age 72 are only recommended with certain risk factors.  
-All adults born between [facility] should be screened once for Hepatitis C. Here is a list of your current Health Maintenance items (your personalized list of preventive services) with a due date: Health Maintenance Due Topic Date Due  Cervical Cancer Screening  01/01/2016  Stool testing for trace blood  12/24/2017 Nolia Stamp Annual Well Visit  06/07/2018  Flu Vaccine  08/01/2018 Ask pharmacy about the new shingles vaccines - [facility] Introducing [facility]! [employer] introduces [employer]Ps patient portal. Now you can access parts of your medical record, email your doctor's office, and request medication refills online. 1. In your internet browser, go to [email or web address] 2. Click on the First Time User? Click Here link in the Sign In box. You will see the New Member Sign Up page. 3. Enter your SERPs Access Code exactly as it appears below. You will not need to use this code after youve completed the sign-up process. If you do not sign up before the expiration date, you must request a new code. · SERPs Access Code: [email or web address] Expires: 11/19/2018 10:15 AM 
 
4. Enter the last four digits of your Social Security Number (xxxx) and Date of Birth (mm/dd/yyyy) as indicated and click Submit. You will be taken to the next sign-up page. 5. Create a SERPs ID. This will be your SERPs login ID and cannot be changed, so think of one that is secure and easy to remember. 6. Create a SERPs password. You can change your password at any time. 7. Enter your Password Reset Question and Answer. This can be used at a later time if you forget your password. 8. Enter your e-mail address. You will receive e-mail notification when new information is available in [address] 9. Click Sign Up. You can now view and download portions of your medical record. 10. Click the Download Summary menu link to download a portable copy of your medical information. If you have questions, please visit the Frequently Asked Questions section of the SERPs website. Remember, SERPs is NOT to be used for urgent needs. For medical emergencies, dial 911. Now available from your iPhone and Android! Please provide this summary of care documentation to your next provider. Your primary care clinician is listed as [address] If you have any questions after today's visit, please call [phone].

## 2018-08-21 NOTE — PATIENT INSTRUCTIONS
Medicare Wellness Visit, Female     The best way to live healthy is to have a lifestyle where you eat a well-balanced diet, exercise regularly, limit alcohol use, and quit all forms of tobacco/nicotine, if applicable. Regular preventive services are another way to keep healthy. Preventive services (vaccines, screening tests, monitoring & exams) can help personalize your care plan, which helps you manage your own care. Screening tests can find health problems at the earliest stages, when they are easiest to treat. Rishabh Emery follows the current, evidence-based guidelines published by the [facility] ([facility]) when recommending preventive services for our patients. Because we follow these guidelines, sometimes recommendations change over time as research supports it. (For example, mammograms used to be recommended annually. Even though Medicare will still pay for an annual mammogram, the newer guidelines recommend a mammogram every two years for women of average risk.)  Of course, you and your doctor may decide to screen more often for some diseases, based on your risk and your health status. Preventive services for you include:  - Medicare offers their members a free annual wellness visit, which is time for you and your primary care provider to discuss and plan for your preventive service needs. Take advantage of this benefit every year!  -All adults over the age of 72 should receive the recommended pneumonia vaccines. Current USPSTF guidelines recommend a series of two vaccines for the best pneumonia protection.   -All adults should have a flu vaccine yearly and a tetanus vaccine every 10 years. All adults age 61 and older should receive a shingles vaccine once in their lifetime.    -A bone mass density test is recommended when a woman turns 65 to screen for osteoporosis. This test is only recommended one time, as a screening.  Some providers will use this same test as a disease monitoring tool if you already have osteoporosis. -All adults age 38-68 who are overweight should have a diabetes screening test once every three years.   -Other screening tests and preventive services for persons with diabetes include: an eye exam to screen for diabetic retinopathy, a kidney function test, a foot exam, and stricter control over your cholesterol.   -Cardiovascular screening for adults with routine risk involves an electrocardiogram (ECG) at intervals determined by your doctor.   -Colorectal cancer screenings should be done for adults age 54-65 with no increased risk factors for colorectal cancer. There are a number of acceptable methods of screening for this type of cancer. Each test has its own benefits and drawbacks. Discuss with your doctor what is most appropriate for you during your annual wellness visit. The different tests include: colonoscopy (considered the best screening method), a fecal occult blood test, a fecal DNA test, and sigmoidoscopy. -Breast cancer screenings are recommended every other year for women of normal risk, age 54-69.  -Cervical cancer screenings for women over age 72 are only recommended with certain risk factors.   -All adults born between [facility] should be screened once for Hepatitis C.      Here is a list of your current Health Maintenance items (your personalized list of preventive services) with a due date:  Health Maintenance Due   Topic Date Due    Cervical Cancer Screening  01/01/2016    Stool testing for trace blood  12/24/2017    Annual Well Visit  06/07/2018    Flu Vaccine  08/01/2018         Ask pharmacy about the new shingles vaccines - [facility]

## 2018-08-21 NOTE — PROGRESS NOTES
Room 13    Chief Complaint   Patient presents with    Annual Wellness Visit    Thyroid Problem             1. Have you been to the ER, urgent care clinic since your last visit? Hospitalized since your last visit no    2. Have you seen or consulted any other health care providers outside of the [address] [place] [address] since your last visit?   Include any pap smears or colon screening no

## 2018-08-21 NOTE — PROGRESS NOTES
This is the Subsequent Medicare Annual Wellness Exam, performed 12 months or more after the Initial AWV or the last Subsequent AWV    I have reviewed the patient's medical history in detail and updated the computerized patient record. 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 Shanti Moy Unspecified fetal growth retardation, 1,250-1,499 grams     Unspecified hearing loss     Vitamin D deficiency 2011     (ventriculoperitoneal) shunt status       Past Surgical History:   [facility]    HX OTHER SURGICAL  223710     shunt insertion    HX OTHER SURGICAL  082943    shunt replacement    HX OTHER SURGICAL  897718    shunt revision    HX OTHER SURGICAL  795884    shunt replacement     Current Outpatient Prescriptions   Medication Sig Dispense Refill    Omeprazole delayed release (PRILOSEC D/R) 20 mg tablet Take 1 Tab by mouth daily. 90 Tab 3    levothyroxine (SYNTHROID) 50 mcg tablet TAKE 1 TABLET BY MOUTH DAILY BEFORE BREAKFAST 90 Tab 1    rosuvastatin (CRESTOR) 5 mg tablet TAKE 1 TABLET BY MOUTH EVERY NIGHT 30 Tab 5    ascorbic acid (VITAMIN C) 250 mg tablet Take  by mouth.  MULTI-VITAMIN PO Take  by mouth.  cholecalciferol (VITAMIN D3) 1,000 unit tablet Take 1 Tab by mouth daily. 30 Tab 11    calcium carb-vit d2-minerals (CALTRATE PLUS) Tab Take 1 Tab by mouth two (2) times daily (with meals). 60 Tab 11    meclizine (ANTIVERT) 25 mg tablet Take 1 Tab by mouth three (3) times daily as needed.  15 Tab 1    ibuprofen (MOTRIN) 100 mg tablet Take 100 mg by mouth every six (6) hours as needed. 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 Functional Ability and Level of Safety:   Hearing Loss  Hearing is good. Activities of Daily Living  The home contains: no safety equipment. Patient does total self care    Fall Risk  Fall Risk Assessment, last 12 mths 8/21/2018   Able to walk? Yes   Fall in past 12 months?  No       Abuse Screen  Patient is not abused    Cognitive Screening   Evaluation of Cognitive Function:  Has your family/caregiver stated any concerns about your memory: no      Patient Care Team   Patient Care Team:  Yancy Ryan MD as PCP - General (Internal Medicine)  Renetta Mckeon MD (Obstetrics & Gynecology)  Lena Cheung RN as Ambulatory Care Navigator    Assessment/Plan   Education and counseling provided:  Are appropriate based on today's review and evaluation       ICD-10-CM ICD-9-CM    1. Medicare annual wellness visit, subsequent Z00.00 V70.0    2. Colon cancer screening Z12.11 V76.51 COLOGUARD TEST (FECAL DNA COLORECTAL CANCER SCREENING)   3. Vitamin D deficiency E55.9 268.9 VITAMIN D, 25 HYDROXY   4. Migraine without status migrainosus, not intractable, unspecified migraine type G43.909 346.90 CBC WITH AUTOMATED DIFF   5. Hypothyroidism due to acquired atrophy of thyroid E03.4 244.8 T4, FREE     246.8 TSH 3RD GENERATION   6. Hyperlipidemia, unspecified hyperlipidemia type E78.5 272.4 CK      LIPID PANEL      METABOLIC PANEL, COMPREHENSIVE   7. Anxiety and depression F41.9 300.00     F32.9 311    8. Hyperglycemia R73.9 790.29 HEMOGLOBIN A1C WITH EAG     Follow-up Disposition:  Return in about 6 months (around 2/21/2019), or if symptoms worsen or fail to improve, for thyroid, cholesterol.   results and schedule of future studies reviewed with patient  reviewed diet, exercise and weight  cardiovascular risk and specific lipid/LDL goals reviewed  reviewed medications and side effects in detail    Agreed to cologuard   [facility] at pharmacy

## 2018-08-22 DIAGNOSIS — E55.9 VITAMIN D DEFICIENCY: ICD-10-CM

## 2018-08-22 LAB
25(OH)D3+25(OH)D2 SERPL-MCNC: 27.1 NG/ML (ref 30–100)
ALBUMIN SERPL-MCNC: 4.7 G/DL (ref 3.5–5.5)
ALBUMIN/GLOB SERPL: 1.5 {RATIO} (ref 1.2–2.2)
ALP SERPL-CCNC: 95 IU/L (ref 39–117)
ALT SERPL-CCNC: 17 IU/L (ref 0–32)
AST SERPL-CCNC: 11 IU/L (ref 0–40)
BASOPHILS # BLD AUTO: 0 X10E3/UL (ref 0–0.2)
BASOPHILS NFR BLD AUTO: 0 %
BILIRUB SERPL-MCNC: 0.3 MG/DL (ref 0–1.2)
BUN SERPL-MCNC: 9 MG/DL (ref 6–24)
BUN/CREAT SERPL: 15 (ref 9–23)
CALCIUM SERPL-MCNC: 9.6 MG/DL (ref 8.7–10.2)
CHLORIDE SERPL-SCNC: 103 MMOL/L (ref 96–106)
CHOLEST SERPL-MCNC: 148 MG/DL (ref 100–199)
CK SERPL-CCNC: 66 U/L (ref 24–173)
CO2 SERPL-SCNC: 23 MMOL/L (ref 20–29)
CREAT SERPL-MCNC: 0.62 MG/DL (ref 0.57–1)
EOSINOPHIL # BLD AUTO: 0.1 X10E3/UL (ref 0–0.4)
EOSINOPHIL NFR BLD AUTO: 2 %
ERYTHROCYTE [DISTWIDTH] IN BLOOD BY AUTOMATED COUNT: 12.4 % (ref 12.3–15.4)
EST. AVERAGE GLUCOSE BLD GHB EST-MCNC: 94 MG/DL
GLOBULIN SER CALC-MCNC: 3.2 G/DL (ref 1.5–4.5)
GLUCOSE SERPL-MCNC: 81 MG/DL (ref 65–99)
HBA1C MFR BLD: 4.9 % (ref 4.8–5.6)
HCT VFR BLD AUTO: 42.2 % (ref 34–46.6)
HDLC SERPL-MCNC: 55 MG/DL
HGB BLD-MCNC: 13.6 G/DL (ref 11.1–15.9)
IMM GRANULOCYTES # BLD: 0 X10E3/UL (ref 0–0.1)
IMM GRANULOCYTES NFR BLD: 0 %
LDLC SERPL CALC-MCNC: 77 MG/DL (ref 0–99)
LYMPHOCYTES # BLD AUTO: 1.6 X10E3/UL (ref 0.7–3.1)
LYMPHOCYTES NFR BLD AUTO: 29 %
MCH RBC QN AUTO: 30.5 PG (ref 26.6–33)
MCHC RBC AUTO-ENTMCNC: 32.2 G/DL (ref 31.5–35.7)
MCV RBC AUTO: 95 FL (ref 79–97)
MONOCYTES # BLD AUTO: 0.4 X10E3/UL (ref 0.1–0.9)
MONOCYTES NFR BLD AUTO: 7 %
NEUTROPHILS # BLD AUTO: 3.4 X10E3/UL (ref 1.4–7)
NEUTROPHILS NFR BLD AUTO: 62 %
PLATELET # BLD AUTO: 309 X10E3/UL (ref 150–379)
POTASSIUM SERPL-SCNC: 3.9 MMOL/L (ref 3.5–5.2)
PROT SERPL-MCNC: 7.9 G/DL (ref 6–8.5)
RBC # BLD AUTO: 4.46 X10E6/UL (ref 3.77–5.28)
SODIUM SERPL-SCNC: 141 MMOL/L (ref 134–144)
T4 FREE SERPL-MCNC: 1.35 NG/DL (ref 0.82–1.77)
TRIGL SERPL-MCNC: 78 MG/DL (ref 0–149)
TSH SERPL DL<=0.005 MIU/L-ACNC: 2.84 UIU/ML (ref 0.45–4.5)
VLDLC SERPL CALC-MCNC: 16 MG/DL (ref 5–40)
WBC # BLD AUTO: 5.5 X10E3/UL (ref 3.4–10.8)

## 2018-08-22 RX ORDER — MELATONIN
2000 DAILY
Qty: 60 TAB | Refills: 11 | Status: SHIPPED | OUTPATIENT
Start: 2018-08-22 | End: 2020-12-22 | Stop reason: ALTCHOICE

## 2018-09-14 DIAGNOSIS — E78.5 HYPERLIPIDEMIA, UNSPECIFIED HYPERLIPIDEMIA TYPE: ICD-10-CM

## 2018-09-14 RX ORDER — ROSUVASTATIN CALCIUM 5 MG/1
TABLET, COATED ORAL
Qty: 30 TAB | Refills: 5 | Status: SHIPPED | OUTPATIENT
Start: 2018-09-14 | End: 2019-04-18 | Stop reason: SDUPTHER

## 2018-11-02 DIAGNOSIS — E78.5 HYPERLIPIDEMIA, UNSPECIFIED HYPERLIPIDEMIA TYPE: ICD-10-CM

## 2018-11-02 RX ORDER — ROSUVASTATIN CALCIUM 5 MG/1
TABLET, COATED ORAL
Qty: 30 TAB | Refills: 5 | Status: SHIPPED | OUTPATIENT
Start: 2018-11-02 | End: 2019-05-14 | Stop reason: SDUPTHER

## 2018-11-20 ENCOUNTER — ANESTHESIA EVENT (OUTPATIENT)
Dept: ENDOSCOPY | Age: 53
End: 2018-11-20
Payer: MEDICARE

## 2018-11-20 ENCOUNTER — ANESTHESIA (OUTPATIENT)
Dept: ENDOSCOPY | Age: 53
End: 2018-11-20
Payer: MEDICARE

## 2018-11-20 ENCOUNTER — HOSPITAL ENCOUNTER (OUTPATIENT)
Age: 53
Setting detail: OUTPATIENT SURGERY
Discharge: HOME | End: 2018-11-20
Attending: SPECIALIST | Admitting: SPECIALIST
Payer: MEDICARE

## 2018-11-20 VITALS
OXYGEN SATURATION: 98 % | DIASTOLIC BLOOD PRESSURE: 71 MMHG | TEMPERATURE: 97.8 F | RESPIRATION RATE: 18 BRPM | SYSTOLIC BLOOD PRESSURE: 98 MMHG

## 2018-11-20 PROCEDURE — 76040000019: Performed by: SPECIALIST

## 2018-11-20 PROCEDURE — 74011250636 HC RX REV CODE- 250/636

## 2018-11-20 PROCEDURE — 76060000031 HC ANESTHESIA FIRST 0.5 HR: Performed by: SPECIALIST

## 2018-11-20 PROCEDURE — 77030027957 HC TBNG IRR ENDOGTR BUSS -B: Performed by: SPECIALIST

## 2018-11-20 RX ORDER — MIDAZOLAM HYDROCHLORIDE 1 MG/ML
.25-1 INJECTION, SOLUTION INTRAMUSCULAR; INTRAVENOUS
Status: DISCONTINUED | OUTPATIENT
Start: 2018-11-20 | End: 2018-11-20 | Stop reason: HOSPADM

## 2018-11-20 RX ORDER — SODIUM CHLORIDE 9 MG/ML
50 INJECTION, SOLUTION INTRAVENOUS CONTINUOUS
Status: DISCONTINUED | OUTPATIENT
Start: 2018-11-20 | End: 2018-11-20 | Stop reason: HOSPADM

## 2018-11-20 RX ORDER — PROPOFOL 10 MG/ML
INJECTION, EMULSION INTRAVENOUS AS NEEDED
Status: DISCONTINUED | OUTPATIENT
Start: 2018-11-20 | End: 2018-11-20 | Stop reason: HOSPADM

## 2018-11-20 RX ORDER — SODIUM CHLORIDE 9 MG/ML
INJECTION, SOLUTION INTRAVENOUS
Status: DISCONTINUED | OUTPATIENT
Start: 2018-11-20 | End: 2018-11-20 | Stop reason: HOSPADM

## 2018-11-20 RX ORDER — LIDOCAINE HYDROCHLORIDE 20 MG/ML
INJECTION, SOLUTION EPIDURAL; INFILTRATION; INTRACAUDAL; PERINEURAL AS NEEDED
Status: DISCONTINUED | OUTPATIENT
Start: 2018-11-20 | End: 2018-11-20 | Stop reason: HOSPADM

## 2018-11-20 RX ORDER — SODIUM CHLORIDE 0.9 % (FLUSH) 0.9 %
5-10 SYRINGE (ML) INJECTION EVERY 8 HOURS
Status: DISCONTINUED | OUTPATIENT
Start: 2018-11-20 | End: 2018-11-20 | Stop reason: HOSPADM

## 2018-11-20 RX ORDER — EPINEPHRINE 0.1 MG/ML
1 INJECTION INTRACARDIAC; INTRAVENOUS
Status: DISCONTINUED | OUTPATIENT
Start: 2018-11-20 | End: 2018-11-20 | Stop reason: HOSPADM

## 2018-11-20 RX ORDER — NALOXONE HYDROCHLORIDE 0.4 MG/ML
0.4 INJECTION, SOLUTION INTRAMUSCULAR; INTRAVENOUS; SUBCUTANEOUS
Status: DISCONTINUED | OUTPATIENT
Start: 2018-11-20 | End: 2018-11-20 | Stop reason: HOSPADM

## 2018-11-20 RX ORDER — ATROPINE SULFATE 0.1 MG/ML
0.5 INJECTION INTRAVENOUS
Status: DISCONTINUED | OUTPATIENT
Start: 2018-11-20 | End: 2018-11-20 | Stop reason: HOSPADM

## 2018-11-20 RX ORDER — FLUMAZENIL 0.1 MG/ML
0.2 INJECTION INTRAVENOUS
Status: DISCONTINUED | OUTPATIENT
Start: 2018-11-20 | End: 2018-11-20 | Stop reason: HOSPADM

## 2018-11-20 RX ORDER — FENTANYL CITRATE 50 UG/ML
200 INJECTION, SOLUTION INTRAMUSCULAR; INTRAVENOUS
Status: DISCONTINUED | OUTPATIENT
Start: 2018-11-20 | End: 2018-11-20 | Stop reason: HOSPADM

## 2018-11-20 RX ORDER — DEXTROMETHORPHAN/PSEUDOEPHED 2.5-7.5/.8
1.2 DROPS ORAL
Status: DISCONTINUED | OUTPATIENT
Start: 2018-11-20 | End: 2018-11-20 | Stop reason: HOSPADM

## 2018-11-20 RX ORDER — SODIUM CHLORIDE 0.9 % (FLUSH) 0.9 %
5-10 SYRINGE (ML) INJECTION AS NEEDED
Status: DISCONTINUED | OUTPATIENT
Start: 2018-11-20 | End: 2018-11-20 | Stop reason: HOSPADM

## 2018-11-20 RX ADMIN — SODIUM CHLORIDE: 9 INJECTION, SOLUTION INTRAVENOUS at 12:32

## 2018-11-20 RX ADMIN — PROPOFOL 50 MG: 10 INJECTION, EMULSION INTRAVENOUS at 12:35

## 2018-11-20 RX ADMIN — LIDOCAINE HYDROCHLORIDE 50 MG: 20 INJECTION, SOLUTION EPIDURAL; INFILTRATION; INTRACAUDAL; PERINEURAL at 12:32

## 2018-11-20 RX ADMIN — PROPOFOL 30 MG: 10 INJECTION, EMULSION INTRAVENOUS at 12:38

## 2018-11-20 RX ADMIN — PROPOFOL 100 MG: 10 INJECTION, EMULSION INTRAVENOUS at 12:32

## 2018-11-20 NOTE — PROGRESS NOTES
Freddy [facility] 
1965 
[id number] Situation: 
Verbal report received from: Filomena Villa Procedure: Procedure(s): 
COLONOSCOPY Background: 
 
Preoperative diagnosis: SCREENING Postoperative diagnosis: 1. Diverticulosis :  Dr. Aidan Baugh Assistant(s): Endoscopy Technician-1: Azalee Lanes Endoscopy RN-1: Katy Salinas RN Specimens: * No specimens in log * H. Pylori  no Assessment: 
Intra-procedure medications O Anesthesia gave intra-procedure sedation and medications, see anesthesia flow sheet yes Intravenous fluids: NS@ Lucianne Glatter Vital signs stable Abdominal assessment: round and soft Recommendation: 
Discharge patient per MD order Family or Friend Permission to share finding with family or friend yes

## 2018-11-20 NOTE — ANESTHESIA PREPROCEDURE EVALUATION
Anesthetic History No history of anesthetic complications Review of Systems / Medical History Patient summary reviewed, nursing notes reviewed and pertinent labs reviewed Pulmonary Within defined limits Neuro/Psych Comments:  shunt Cardiovascular Exercise tolerance: >4 METS 
  
GI/Hepatic/Renal 
  
 
 
 
 
 
 Endo/Other Hypothyroidism Other Findings Physical Exam 
 
Airway Mallampati: I 
TM Distance: > 6 cm Neck ROM: normal range of motion Mouth opening: Normal 
 
 Cardiovascular Rhythm: regular Rate: normal 
 
 
 
 Dental 
No notable dental hx Pulmonary Breath sounds clear to auscultation Abdominal 
 
 
 
 Other Findings Anesthetic Plan ASA: 2 Anesthesia type: MAC Induction: Intravenous Anesthetic plan and risks discussed with: Patient

## 2018-11-20 NOTE — DISCHARGE INSTRUCTIONS
Jason Gentile  [id number]  1965    COLON DISCHARGE INSTRUCTIONS  Discomfort:  Redness at IV site- apply warm compress to area; if redness or soreness persist- contact your physician  There may be a slight amount of blood passed from the rectum  Gaseous discomfort- walking, belching will help relieve any discomfort  You may not operate a vehicle for 12 hours  You may not engage in an occupation involving machinery or appliances for rest of today  You may not drink alcoholic beverages for at least 12 hours  Avoid making any critical decisions for at least 24 hour  DIET:   High fiber diet. - however -  remember your colon is empty and a heavy meal will produce gas. Avoid these foods:  vegetables, fried / greasy foods, carbonated drinks for today. MEDICATIONS:      Regarding Aspirin or Nonsteroidal medications, please see below. ACTIVITY:  You may resume your normal daily activities it is recommended that you spend the remainder of the day resting -  avoid any strenuous activity. CALL M.D. ANY SIGN OF:  Increasing pain, nausea, vomiting  Abdominal distension (swelling)  New increased bleeding (oral or rectal)  Fever (chills)  Pain in chest area  Bloody discharge from nose or mouth  Shortness of breath    You may  take any Advil, Aspirin, Ibuprofen, Motrin, Aleve, or   Tylenol as needed for pain. Follow-up Instructions:   Call Dr. Estrada Part office if you develop severe pain or bloody stools  Telephone #  [phone]      Tomy Almaraz from Nurse    The following personal items collected during your admission are returned to you:   Dental Appliance: Dental Appliances: None  Vision: Visual Aid: Glasses  Hearing Aid:    Jewelry:    Clothing:    Other Valuables:    Valuables sent to safe: MyChart Activation    Thank you for requesting access to S[facility]. Please follow the instructions below to securely access and download your online medical record.  Sinch allows you to send messages to your doctor, view your test results, renew your prescriptions, schedule appointments, and more. How Do I Sign Up? 1. In your internet browser, go to [email or web address]  2. Click on the First Time User? Click Here link in the Sign In box. You will be redirect to the New Member Sign Up page. 3. Enter your Mass[employer] Access Code exactly as it appears below. You will not need to use this code after youve completed the sign-up process. If you do not sign up before the expiration date, you must request a new code. Massdrop Access Code: [email or web address]  Expires: 2019 10:20 AM (This is the date your Massdrop access code will )    4. Enter the last four digits of your Social Security Number (xxxx) and Date of Birth (mm/dd/yyyy) as indicated and click Submit. You will be taken to the next sign-up page. 5. Create a Massdrop ID. This will be your Massdrop login ID and cannot be changed, so think of one that is secure and easy to remember. 6. Create a Massdrop password. You can change your password at any time. 7. Enter your Password Reset Question and Answer. This can be used at a later time if you forget your password. 8. Enter your e-mail address. You will receive e-mail notification when new information is available in [address] 9. Click Sign Up. You can now view and download portions of your medical record. 10. Click the Download Summary menu link to download a portable copy of your medical information. Additional Information    If you have questions, please visit the Frequently Asked Questions section of the [employer] website at [email or web address] Remember, [employer] is NOT to be used for urgent needs. For medical emergencies, dial 911. Diverticulosis: Care Instructions  Your Care Instructions  In diverticulosis, pouches called diverticula form in the wall of the large intestine (colon). The pouches do not cause any pain or other symptoms.  Most people who have diverticulosis do not know they have it. But the pouches sometimes bleed, and if they become infected, they can cause pain and other symptoms. When this happens, it is called diverticulitis. Diverticula form when pressure pushes the wall of the colon outward at certain weak points. A diet that is too low in fiber can cause diverticula. Follow-up care is a key part of your treatment and safety. Be sure to make and go to all appointments, and call your doctor if you are having problems. It's also a good idea to know your test results and keep a list of the medicines you take. How can you care for yourself at home? · Include fruits, leafy green vegetables, beans, and whole grains in your diet each day. These foods are high in fiber. · Take a fiber supplement, such as Citrucel or Metamucil, every day if needed. Read and follow all instructions on the label. · Drink plenty of fluids, enough so that your urine is light yellow or clear like water. If you have kidney, heart, or liver disease and have to limit fluids, talk with your doctor before you increase the amount of fluids you drink. · Get at least 30 minutes of exercise on most days of the week. Walking is a good choice. You also may want to do other activities, such as running, swimming, cycling, or playing tennis or team sports. · Cut out foods that cause gas, pain, or other symptoms. When should you call for help? Call your doctor now or seek immediate medical care if:    · You have belly pain.     · You pass maroon or very bloody stools.     · You have a fever.     · You have nausea and vomiting.     · You have unusual changes in your bowel movements or abdominal swelling.     · You have burning pain when you urinate.     · You have abnormal vaginal discharge.     · You have shoulder pain.     · You have cramping pain that does not get better when you have a bowel movement or pass gas.     · You pass gas or stool from your urethra while urinating.  Watch closely for changes in your health, and be sure to contact your doctor if you have any problems. Where can you learn more? Go to http://carlos-jono.info/. Enter Y933 in the search box to learn more about \"Diverticulosis: Care Instructions. \"  Current as of: March 28, 2018  Content Version: 11.8  © 7515-1139 [employer]. Care instructions adapted under license by [employer] (which disclaims liability or warranty for this information). If you have questions about a medical condition or this instruction, always ask your healthcare professional. [address] any warranty or liability for your use of this information.

## 2018-11-20 NOTE — ANESTHESIA POSTPROCEDURE EVALUATION
Post-Anesthesia Evaluation and Assessment Patient: Julee Nixon MRN: [id number]  SSN: [id number] YOB: 1965  Age: 50 y.o. Sex: female I have evaluated the patient and they are stable and ready for discharge from the PACU. Cardiovascular Function/Vital Signs Visit Vitals /75 Pulse 80 Temp 36.6 °C (97.8 °F) Resp 15 SpO2 100% Patient is status post MAC anesthesia for Procedure(s): 
COLONOSCOPY. Nausea/Vomiting: None Postoperative hydration reviewed and adequate. Pain: 
Pain Scale 1: Numeric (0 - 10) (11/20/18 1252) Pain Intensity 1: 0 (11/20/18 1252) Managed Neurological Status: At baseline Mental Status, Level of Consciousness: Alert and  oriented to person, place, and time Pulmonary Status:  
O2 Device: Room air (11/20/18 1252) Adequate oxygenation and airway patent Complications related to anesthesia: None Post-anesthesia assessment completed. No concerns Signed By: Tomy Fuchs MD   
 November 20, 2018 Procedure(s): 
COLONOSCOPY. 
 
<BSHSIANPOST> Visit Vitals /75 Pulse 80 Temp 36.6 °C (97.8 °F) Resp 15 SpO2 100%

## 2018-11-20 NOTE — PROCEDURES
[address] [facility]  [place], [address] [facility]                 Colonoscopy Procedure Note    Indications:   See Preoperative Diagnosis above  Referring Physician: Esther Malcolm MD  Anesthesia/Sedation: MAC anesthesia Propofol  Endoscopist:  Dr. Roseanna Essex  Assistant:  Endoscopy Technician-1: Crispin Keller  Endoscopy RN-1: Karlee Hay RN  Preoperative diagnosis: SCREENING  Postoperative diagnosis: 1. Diverticulosis    Procedure in Detail:  Informed consent was obtained for the procedure, including sedation. Risks of perforation, hemorrhage, adverse drug reaction, and aspiration were discussed. The patient was placed in the left lateral decubitus position. Based on the pre-procedure assessment, including review of the patient's medical history, medications, allergies, and review of systems, she had been deemed to be an appropriate candidate for moderate sedation; she was therefore sedated with the medications listed above. The patient was monitored continuously with ECG tracing, pulse oximetry, blood pressure monitoring, and direct observations. A rectal examination was performed. The [id number] was inserted into the rectum and advanced under direct vision to the cecum, which was identified by the ileocecal valve and appendiceal orifice. The quality of the colonic preparation was good. A careful inspection was made as the colonoscope was withdrawn, including a retroflexed view of the rectum; findings and interventions are described below. Appropriate photodocumentation was obtained. Findings:  Rectum: normal  Sigmoid: moderate diverticulosis; Descending Colon: moderate diverticulosis;  Transverse Colon: normal  Ascending Colon: normal  Cecum: normal      Specimens:    none    EBL: None    Complications: None; patient tolerated the procedure well.     Recommendations:     - For colon cancer screening in this average-risk patient, colonoscopy may be repeated in 10 years. - High fiber diet.         Signed By: Aurelia De Luna MD                        November 20, 2018

## 2018-11-20 NOTE — PROGRESS NOTES

## 2018-11-20 NOTE — H&P
Colonoscopy History and Physical 
 
 
The patient was seen and examined. Date of last colonoscopy: none, Polyps  No   
 
The airway was assessed and documented. The problem list, past medical history, and medications were reviewed. Patient Active Problem List  
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  Migraine G43.909  Vitamin D deficiency E55.9  Hypothyroidism E03.9  OCD (obsessive compulsive disorder) F42.9  Hyperlipidemia E78.5  Anxiety and depression F41.9, F32.9  Hearing loss H91.90  Allergic rhinitis J30.9 Social History Socioeconomic History  Marital status: SINGLE Spouse name: Not on file  Number of children: Not on file  Years of education: Not on file  Highest education level: Not on file Social Needs  Financial resource strain: Not on file  Food insecurity - worry: Not on file  Food insecurity - inability: Not on file  Transportation needs - medical: Not on file  Transportation needs - non-medical: Not on file Occupational History  Not on file Tobacco Use  Smoking status: Never Smoker  Smokeless tobacco: Never Used Substance and Sexual Activity  Alcohol use: Not on file  Drug use: Not on file  Sexual activity: Not on file Other Topics Concern  Not on file Social History Narrative  Not on file Past Medical History:  
Diagnosis Date  Cerebral hemorrhage at birth   
[place] Hearing loss  Hydrocephalus, congenital (HCC)  Hypothyroidism 2011  Impacted cerumen  Leiomyoma of uterus, unspecified  Migraine  Mild intellectual disabilities  Myopia  OCD (obsessive compulsive disorder) 2012  Pneumothorax of   Routine gynecological examination Dr. Dee Downing  Unspecified fetal growth retardation, 1,250-1,499 grams  Unspecified hearing loss  Vitamin D deficiency 5/25/2011   (ventriculoperitoneal) shunt status Prior to Admission Medications Prescriptions Last Dose Informant Patient Reported? Taking? MULTI-VITAMIN PO 11/18/2018  Yes No  
Sig: Take  by mouth. Omeprazole delayed release (PRILOSEC D/R) 20 mg tablet Unknown at Unknown time  No No  
Sig: Take 1 Tab by mouth daily. ascorbic acid (VITAMIN C) 250 mg tablet 11/18/2018  Yes No  
Sig: Take  by mouth.  
calcium carb-vit d2-minerals (CALTRATE PLUS) Tab Unknown at Unknown time  No No  
Sig: Take 1 Tab by mouth two (2) times daily (with meals). cholecalciferol (VITAMIN D3) 1,000 unit tablet Unknown at Unknown time  No No  
Sig: Take 2 Tabs by mouth daily. ibuprofen (MOTRIN) 100 mg tablet   Yes No  
Sig: Take 100 mg by mouth every six (6) hours as needed. levothyroxine (SYNTHROID) 50 mcg tablet 11/18/2018  No No  
Sig: TAKE 1 TABLET BY MOUTH DAILY BEFORE BREAKFAST  
meclizine (ANTIVERT) 25 mg tablet Unknown at Unknown time  No No  
Sig: Take 1 Tab by mouth three (3) times daily as needed. rosuvastatin (CRESTOR) 5 mg tablet 11/18/2018  No No  
Sig: TAKE 1 TABLET BY MOUTH EVERY NIGHT  
rosuvastatin (CRESTOR) 5 mg tablet 11/18/2018  No No  
Sig: TAKE 1 TABLET BY MOUTH EVERY NIGHT Facility-Administered Medications: None The patient was seen and examined in the endoscopy suite. The airway was assessed and docuemented. The problem list and medications were reviewed. Chief complaint, history of present illness, and review of systems and Past medical History are positive for: hydrocephalus,  shunt and colon cancer screening. The heart, lungs and mental status were satisfactory for the administration of sedation and for the procedure. I discussed with the patient the objectives, risks, consequences and alternatives to the procedure. Plan: Endoscopic procedure with sedation Glenna Martin MD  
11/20/2018 12:50 PM

## 2018-12-14 ENCOUNTER — OFFICE VISIT (OUTPATIENT)
Dept: INTERNAL MEDICINE CLINIC | Age: 53
End: 2018-12-14

## 2018-12-14 VITALS
HEART RATE: 86 BPM | BODY MASS INDEX: 29.33 KG/M2 | OXYGEN SATURATION: 99 % | SYSTOLIC BLOOD PRESSURE: 131 MMHG | WEIGHT: 159.4 LBS | HEIGHT: 62 IN | TEMPERATURE: 97.5 F | DIASTOLIC BLOOD PRESSURE: 77 MMHG | RESPIRATION RATE: 16 BRPM

## 2018-12-14 DIAGNOSIS — J30.9 ALLERGIC RHINITIS, UNSPECIFIED SEASONALITY, UNSPECIFIED TRIGGER: ICD-10-CM

## 2018-12-14 DIAGNOSIS — E55.9 VITAMIN D DEFICIENCY: ICD-10-CM

## 2018-12-14 DIAGNOSIS — J20.9 ACUTE BRONCHITIS, UNSPECIFIED ORGANISM: ICD-10-CM

## 2018-12-14 DIAGNOSIS — E78.5 HYPERLIPIDEMIA, UNSPECIFIED HYPERLIPIDEMIA TYPE: ICD-10-CM

## 2018-12-14 DIAGNOSIS — Z98.2 VP (VENTRICULOPERITONEAL) SHUNT STATUS: ICD-10-CM

## 2018-12-14 DIAGNOSIS — J01.00 ACUTE NON-RECURRENT MAXILLARY SINUSITIS: Primary | ICD-10-CM

## 2018-12-14 DIAGNOSIS — E03.4 HYPOTHYROIDISM DUE TO ACQUIRED ATROPHY OF THYROID: ICD-10-CM

## 2018-12-14 RX ORDER — AZITHROMYCIN 250 MG/1
TABLET, FILM COATED ORAL
Qty: 6 TAB | Refills: 0 | Status: SHIPPED | OUTPATIENT
Start: 2018-12-14 | End: 2018-12-19

## 2018-12-14 RX ORDER — LORATADINE 10 MG/1
10 TABLET ORAL DAILY
Qty: 30 TAB | Refills: 5 | Status: SHIPPED | OUTPATIENT
Start: 2018-12-14 | End: 2022-06-20

## 2018-12-14 RX ORDER — GUAIFENESIN 600 MG/1
600 TABLET, EXTENDED RELEASE ORAL 2 TIMES DAILY
Qty: 30 TAB | Refills: 1 | Status: SHIPPED | OUTPATIENT
Start: 2018-12-14 | End: 2022-06-20

## 2018-12-14 NOTE — PROGRESS NOTES
HPI:  Presents for acute care    1 month hx of wax and waning of URI sx  Cough and congestion  Nasal congestion  Ear fullness  Mild chest congestion, mild chest tightness  No fever    OTC expectorant and DM with some relief      Past medical, Social, and Family history reviewed    Prior to Admission medications    Medication Sig Start Date End Date Taking? Authorizing Provider   rosuvastatin (CRESTOR) 5 mg tablet TAKE 1 TABLET BY MOUTH EVERY NIGHT 9/14/18  Yes Madelaine Andrews MD   Omeprazole delayed release (PRILOSEC D/R) 20 mg tablet Take 1 Tab by mouth daily. 5/9/18  Yes Madelaine Andrews MD   levothyroxine (SYNTHROID) 50 mcg tablet TAKE 1 TABLET BY MOUTH DAILY BEFORE BREAKFAST 4/27/18  Yes Madelaine Andrews MD   ascorbic acid (VITAMIN C) 250 mg tablet Take  by mouth. Yes Provider, Historical   MULTI-VITAMIN PO Take  by mouth. Yes Provider, Historical   rosuvastatin (CRESTOR) 5 mg tablet TAKE 1 TABLET BY MOUTH EVERY NIGHT 11/2/18   Madelaine Andrews MD   cholecalciferol (VITAMIN D3) 1,000 unit tablet Take 2 Tabs by mouth daily. 8/22/18   Madelaine Andrews MD   calcium carb-vit d2-minerals (CALTRATE PLUS) Tab Take 1 Tab by mouth two (2) times daily (with meals). 5/25/11   Madelaine Andrews MD   meclizine (ANTIVERT) 25 mg tablet Take 1 Tab by mouth three (3) times daily as needed. 5/11/11   Madelaine Andrews MD   ibuprofen (MOTRIN) 100 mg tablet Take 100 mg by mouth every six (6) hours as needed. Provider, Historical          ROS  Complete ROS reviewed and negative or stable except as noted in HPI. Physical Exam   Constitutional: She is oriented to person, place, and time. She appears well-nourished. No distress. Dysarthria    HENT:   Nose: Sinus tenderness present. Right sinus exhibits maxillary sinus tenderness. Left sinus exhibits maxillary sinus tenderness.    Mouth/Throat: Oropharynx is clear and moist.   Shunt in place on right side, nontender   Eyes: EOM are normal. Pupils are equal, round, and reactive to light. No scleral icterus. Neck: Normal range of motion. Neck supple. No JVD present. No thyromegaly present. Right sided shunt palpable and nontender   Cardiovascular: Normal rate, regular rhythm and normal heart sounds. Exam reveals no gallop and no friction rub. No murmur heard. Pulmonary/Chest: Effort normal and breath sounds normal. No respiratory distress. She has no wheezes. She has no rales. Abdominal: Soft. Bowel sounds are normal. She exhibits no distension. There is no tenderness. Musculoskeletal: Normal range of motion. She exhibits no edema. Lymphadenopathy:     She has no cervical adenopathy. Neurological: She is alert and oriented to person, place, and time. She exhibits normal muscle tone. Skin: Skin is warm. No rash noted. Psychiatric: She has a normal mood and affect. Nursing note and vitals reviewed. Prior labs reviewed. Assessment/Plan:    ICD-10-CM ICD-9-CM    1. Acute non-recurrent maxillary sinusitis J01.00 461.0 azithromycin (ZITHROMAX) 250 mg tablet      guaiFENesin ER (MUCINEX) 600 mg ER tablet   2. Acute bronchitis, unspecified organism J20.9 466.0 azithromycin (ZITHROMAX) 250 mg tablet      guaiFENesin ER (MUCINEX) 600 mg ER tablet   3. Vitamin D deficiency E55.9 268.9    4. Hypothyroidism due to acquired atrophy of thyroid E03.4 244.8      246.8    5. Hyperlipidemia, unspecified hyperlipidemia type E78.5 272.4    6.  (ventriculoperitoneal) shunt status Z98.2 V45.2    7. Allergic rhinitis, unspecified seasonality, unspecified trigger J30.9 477.9 loratadine (CLARITIN) 10 mg tablet     Follow-up Disposition:  Return in about 2 months (around 2/21/2019), or if symptoms worsen or fail to improve, for thyroid as scheduled.   results and schedule of future studies reviewed with patient  reviewed diet, exercise and weight    cardiovascular risk and specific lipid/LDL goals reviewed  reviewed medications and side effects in detail azithro  loratidine  mucinex

## 2018-12-14 NOTE — PROGRESS NOTES
Exam Room 13    Nika López is a 50 y.o. female    Chief Complaint   Patient presents with    Cold Symptoms     1. Have you been to the ER, urgent care clinic since your last visit? Hospitalized since your last visit? No    2. Have you seen or consulted any other health care providers outside of the [address] [place] [address] since your last visit? Include any pap smears or colon screening.  No     Health Maintenance Due   Topic Date Due    Shingrix Vaccine Age 49> (1 of 2) 12/24/2017    Influenza Age 5 to Adult  08/01/2018       Received influenza vaccine 10/2018 at [place]

## 2019-02-13 DIAGNOSIS — E03.9 ACQUIRED HYPOTHYROIDISM: ICD-10-CM

## 2019-02-13 RX ORDER — LEVOTHYROXINE SODIUM 50 UG/1
TABLET ORAL
Qty: 90 TAB | Refills: 1 | Status: SHIPPED | OUTPATIENT
Start: 2019-02-13 | End: 2019-07-15 | Stop reason: SDUPTHER

## 2019-04-18 ENCOUNTER — HOSPITAL ENCOUNTER (OUTPATIENT)
Dept: LAB | Age: 54
Discharge: HOME | End: 2019-04-18
Payer: MEDICARE

## 2019-04-18 ENCOUNTER — OFFICE VISIT (OUTPATIENT)
Dept: INTERNAL MEDICINE CLINIC | Age: 54
End: 2019-04-18

## 2019-04-18 VITALS
RESPIRATION RATE: 14 BRPM | DIASTOLIC BLOOD PRESSURE: 85 MMHG | HEART RATE: 62 BPM | HEIGHT: 62 IN | BODY MASS INDEX: 28.34 KG/M2 | TEMPERATURE: 97.6 F | SYSTOLIC BLOOD PRESSURE: 120 MMHG | OXYGEN SATURATION: 98 % | WEIGHT: 154 LBS

## 2019-04-18 DIAGNOSIS — Z98.2 VP (VENTRICULOPERITONEAL) SHUNT STATUS: ICD-10-CM

## 2019-04-18 DIAGNOSIS — E03.4 HYPOTHYROIDISM DUE TO ACQUIRED ATROPHY OF THYROID: ICD-10-CM

## 2019-04-18 DIAGNOSIS — J30.9 ALLERGIC RHINITIS, UNSPECIFIED SEASONALITY, UNSPECIFIED TRIGGER: ICD-10-CM

## 2019-04-18 DIAGNOSIS — E55.9 VITAMIN D DEFICIENCY: ICD-10-CM

## 2019-04-18 DIAGNOSIS — R73.9 HYPERGLYCEMIA: ICD-10-CM

## 2019-04-18 DIAGNOSIS — E78.5 HYPERLIPIDEMIA, UNSPECIFIED HYPERLIPIDEMIA TYPE: ICD-10-CM

## 2019-04-18 DIAGNOSIS — K57.90 DIVERTICULOSIS: ICD-10-CM

## 2019-04-18 DIAGNOSIS — R19.7 DIARRHEA, UNSPECIFIED TYPE: Primary | ICD-10-CM

## 2019-04-18 DIAGNOSIS — K90.49 FOOD INTOLERANCE: ICD-10-CM

## 2019-04-18 PROCEDURE — 82306 VITAMIN D 25 HYDROXY: CPT

## 2019-04-18 PROCEDURE — 80053 COMPREHEN METABOLIC PANEL: CPT

## 2019-04-18 PROCEDURE — 82550 ASSAY OF CK (CPK): CPT

## 2019-04-18 PROCEDURE — 85025 COMPLETE CBC W/AUTO DIFF WBC: CPT

## 2019-04-18 PROCEDURE — 83036 HEMOGLOBIN GLYCOSYLATED A1C: CPT

## 2019-04-18 PROCEDURE — 85651 RBC SED RATE NONAUTOMATED: CPT

## 2019-04-18 PROCEDURE — 84439 ASSAY OF FREE THYROXINE: CPT

## 2019-04-18 PROCEDURE — 80061 LIPID PANEL: CPT

## 2019-04-18 PROCEDURE — 84443 ASSAY THYROID STIM HORMONE: CPT

## 2019-04-18 NOTE — PROGRESS NOTES
Rm#15 Chief Complaint Patient presents with  Thyroid Problem 6 month f/u  Cholesterol Problem 6 month f/u  Abdominal Pain DX W/ Diverticulitis 1. Have you been to the ER, urgent care clinic since your last visit? Hospitalized since your last visit? No 
 
2. Have you seen or consulted any other health care providers outside of the [address] [place] [address] since your last visit? Include any pap smears or colon screening. No 
Health Maintenance Due Topic Date Due  Shingrix Vaccine Age 50> (1 of 2) 12/24/2017  BREAST CANCER SCRN MAMMOGRAM  07/14/2019

## 2019-04-18 NOTE — PROGRESS NOTES
HPI: 
Presents for f/u thyroid, lipids, etc 
 
Pt inquires re: foods causing diarrhea Pt cites tomato, corn,  and spicy foods as a source Pt attributes this to diverticulitis No fevers, no chills, no abd pain. Clinically euthyroid. Tolerating statin. Past medical, Social, and Family history reviewed Prior to Admission medications Medication Sig Start Date End Date Taking? Authorizing Provider  
levothyroxine (SYNTHROID) 50 mcg tablet TAKE 1 TABLET BY MOUTH DAILY BEFORE BREAKFAST 2/13/19  Yes Angela Thakkar MD  
rosuvastatin (CRESTOR) 5 mg tablet TAKE 1 TABLET BY MOUTH EVERY NIGHT 11/2/18  Yes Angela Thakkar MD  
Omeprazole delayed release (PRILOSEC D/R) 20 mg tablet Take 1 Tab by mouth daily. Patient taking differently: Take 20 mg by mouth daily. Indications: PRN 5/9/18  Yes Angela Thakkar MD  
ascorbic acid (VITAMIN C) 250 mg tablet Take  by mouth. Yes Provider, Historical  
MULTI-VITAMIN PO Take  by mouth. Yes Provider, Historical  
loratadine (CLARITIN) 10 mg tablet Take 1 Tab by mouth daily. 12/14/18   Angela Thakkar MD  
guaiFENesin ER (MUCINEX) 600 mg ER tablet Take 1 Tab by mouth two (2) times a day. 12/14/18   Angela Thakkar MD  
cholecalciferol (VITAMIN D3) 1,000 unit tablet Take 2 Tabs by mouth daily. 8/22/18   Angela Thakkar MD  
calcium carb-vit d2-minerals (CALTRATE PLUS) Tab Take 1 Tab by mouth two (2) times daily (with meals). 5/25/11   Angela Thakkar MD  
meclizine (ANTIVERT) 25 mg tablet Take 1 Tab by mouth three (3) times daily as needed. 5/11/11   Angela Thakkar MD  
ibuprofen (MOTRIN) 100 mg tablet Take 100 mg by mouth every six (6) hours as needed. Provider, Historical  
  
 
 
ROS Complete ROS reviewed and negative or stable except as noted in HPI. Physical Exam  
Constitutional: She is oriented to person, place, and time. She appears well-nourished. No distress. Dysarthria HENT:  
 Mouth/Throat: Oropharynx is clear and moist.  
Shunt in place on right side, nontender Eyes: Pupils are equal, round, and reactive to light. EOM are normal. No scleral icterus. Neck: Normal range of motion. Neck supple. No JVD present. No thyromegaly present. Right sided shunt palpable and nontender Cardiovascular: Normal rate, regular rhythm and normal heart sounds. Exam reveals no gallop and no friction rub. No murmur heard. Pulmonary/Chest: Effort normal and breath sounds normal. No respiratory distress. She has no wheezes. She has no rales. Abdominal: Soft. Bowel sounds are normal. She exhibits no distension and no mass. There is no tenderness. There is no guarding. Musculoskeletal: Normal range of motion. She exhibits no edema. Lymphadenopathy:  
  She has no cervical adenopathy. Neurological: She is alert and oriented to person, place, and time. She exhibits normal muscle tone. Skin: Skin is warm. No rash noted. Psychiatric: She has a normal mood and affect. Nursing note and vitals reviewed. Prior labs reviewed. Assessment/Plan: ICD-10-CM ICD-9-CM 1. Diarrhea, unspecified type R19.7 787.91 REFERRAL TO ALLERGY  
   SED RATE (ESR) 2. Food intolerance K90.49 579.8 REFERRAL TO ALLERGY 3. Vitamin D deficiency E55.9 268.9 VITAMIN D, 25 HYDROXY 4. Hyperlipidemia, unspecified hyperlipidemia type E78.5 272.4 CK  
   LIPID PANEL  
   METABOLIC PANEL, COMPREHENSIVE 5. Hypothyroidism due to acquired atrophy of thyroid E03.4 244.8 CBC WITH AUTOMATED DIFF  
  246.8 T4, FREE  
   TSH 3RD GENERATION 6.  (ventriculoperitoneal) shunt status Z98.2 V45.2 7. Allergic rhinitis, unspecified seasonality, unspecified trigger J30.9 477.9 8. Diverticulosis K57.90 562.10 9. Hyperglycemia R73.9 790.29 HEMOGLOBIN A1C WITH EAG Follow-up and Dispositions · Return in about 6 months (around 10/18/2019), or if symptoms worsen or fail to improve, for thyroid, Medicare Wellness Visit. results and schedule of future studies reviewed with patient 
reviewed diet, exercise and weight  
cardiovascular risk and specific lipid/LDL goals reviewed 
reviewed medications and side effects in detail Ref allergy re: possible food allergy Check labs Pt to get mammogram at GYN office - Dr. Abe Man

## 2019-04-18 NOTE — PATIENT INSTRUCTIONS
Learning About Diverticulosis and Diverticulitis What are diverticulosis and diverticulitis? In diverticulosis and diverticulitis, pouches called diverticula form in the wall of the large intestine, or colon. · In diverticulosis, the pouches do not cause any pain or other symptoms. · In diverticulitis, the pouches get inflamed or infected and cause symptoms. Doctors aren't sure what causes these pouches in the colon. But they think that a low-fiber diet may play a role. Without fiber to add bulk to the stool, the colon has to work harder than normal to push the stool forward. The pressure from this may cause pouches to form in weak spots along the colon. Some people with diverticulosis get diverticulitis. But experts don't know why this happens. What are the symptoms? · In diverticulosis, most people don't have symptoms. But pouches sometimes bleed. · In diverticulitis, symptoms may last from a few hours to a week or more. They include: ? Belly pain. This is usually in the lower left side. It is sometimes worse when you move. This is the most common symptom. ? Fever and chills. ? Bloating and gas. ? Diarrhea or constipation. ? Nausea and sometimes vomiting. 
? Not feeling like eating. How can you prevent these problems? You may be able to lower your chance of getting diverticulitis. You can do this by taking steps to prevent constipation. · Eat fruits, vegetables, beans, and whole grains every day. These foods are high in fiber. · Drink plenty of fluids (enough so that your urine is light yellow or clear like water). If you have kidney, heart, or liver disease and have to limit fluids, talk with your doctor before you increase the amount of fluids you drink. · Get at least 30 minutes of exercise on most days of the week. Walking is a good choice. You also may want to do other activities, such as running, swimming, cycling, or playing tennis or team sports. · Take a fiber supplement, such as Citrucel or Metamucil, every day if needed. Read and follow all instructions on the label. · Schedule time each day for a bowel movement. Having a daily routine may help. Take your time and do not strain when having a bowel movement. Some people avoid nuts, seeds, berries, and popcorn. They believe that these foods might get trapped in the diverticula and cause pain. But there is no proof that these foods cause diverticulitis or make it worse. How are these problems treated? · The best way to treat diverticulosis is to avoid constipation. (See the tips above.) · Treatment for diverticulitis includes antibiotics and often a change in your diet. You may need only liquids at first. Your doctor may suggest pain medicines for pain or belly cramps. In some cases, surgery may be needed. Follow-up care is a key part of your treatment and safety. Be sure to make and go to all appointments, and call your doctor if you are having problems. It's also a good idea to know your test results and keep a list of the medicines you take. Where can you learn more? Go to http://carlos-jono.info/. Enter M435 in the search box to learn more about \"Learning About Diverticulosis and Diverticulitis. \" Current as of: March 27, 2018 Content Version: 11.9 © 9218-7731 [employer]. Care instructions adapted under license by [employer] (which disclaims liability or warranty for this information). If you have questions about a medical condition or this instruction, always ask your healthcare professional. [place] [address] any warranty or liability for your use of this information.

## 2019-04-19 LAB
25(OH)D3+25(OH)D2 SERPL-MCNC: 36.8 NG/ML (ref 30–100)
ALBUMIN SERPL-MCNC: 4.9 G/DL (ref 3.5–5.5)
ALBUMIN/GLOB SERPL: 1.6 {RATIO} (ref 1.2–2.2)
ALP SERPL-CCNC: 105 IU/L (ref 39–117)
ALT SERPL-CCNC: 15 IU/L (ref 0–32)
AST SERPL-CCNC: 11 IU/L (ref 0–40)
BASOPHILS # BLD AUTO: 0 X10E3/UL (ref 0–0.2)
BASOPHILS NFR BLD AUTO: 0 %
BILIRUB SERPL-MCNC: 0.3 MG/DL (ref 0–1.2)
BUN SERPL-MCNC: 9 MG/DL (ref 6–24)
BUN/CREAT SERPL: 13 (ref 9–23)
CALCIUM SERPL-MCNC: 10.3 MG/DL (ref 8.7–10.2)
CHLORIDE SERPL-SCNC: 105 MMOL/L (ref 96–106)
CHOLEST SERPL-MCNC: 166 MG/DL (ref 100–199)
CK SERPL-CCNC: 68 U/L (ref 24–173)
CO2 SERPL-SCNC: 22 MMOL/L (ref 20–29)
CREAT SERPL-MCNC: 0.67 MG/DL (ref 0.57–1)
EOSINOPHIL # BLD AUTO: 0.1 X10E3/UL (ref 0–0.4)
EOSINOPHIL NFR BLD AUTO: 2 %
ERYTHROCYTE [DISTWIDTH] IN BLOOD BY AUTOMATED COUNT: 13 % (ref 12.3–15.4)
ERYTHROCYTE [SEDIMENTATION RATE] IN BLOOD BY WESTERGREN METHOD: 29 MM/HR (ref 0–40)
EST. AVERAGE GLUCOSE BLD GHB EST-MCNC: 91 MG/DL
GLOBULIN SER CALC-MCNC: 3 G/DL (ref 1.5–4.5)
GLUCOSE SERPL-MCNC: 81 MG/DL (ref 65–99)
HBA1C MFR BLD: 4.8 % (ref 4.8–5.6)
HCT VFR BLD AUTO: 41.3 % (ref 34–46.6)
HDLC SERPL-MCNC: 74 MG/DL
HGB BLD-MCNC: 14 G/DL (ref 11.1–15.9)
IMM GRANULOCYTES # BLD AUTO: 0 X10E3/UL (ref 0–0.1)
IMM GRANULOCYTES NFR BLD AUTO: 0 %
LDLC SERPL CALC-MCNC: 80 MG/DL (ref 0–99)
LYMPHOCYTES # BLD AUTO: 1.6 X10E3/UL (ref 0.7–3.1)
LYMPHOCYTES NFR BLD AUTO: 29 %
MCH RBC QN AUTO: 31.1 PG (ref 26.6–33)
MCHC RBC AUTO-ENTMCNC: 33.9 G/DL (ref 31.5–35.7)
MCV RBC AUTO: 92 FL (ref 79–97)
MONOCYTES # BLD AUTO: 0.4 X10E3/UL (ref 0.1–0.9)
MONOCYTES NFR BLD AUTO: 7 %
NEUTROPHILS # BLD AUTO: 3.4 X10E3/UL (ref 1.4–7)
NEUTROPHILS NFR BLD AUTO: 62 %
PLATELET # BLD AUTO: 371 X10E3/UL (ref 150–379)
POTASSIUM SERPL-SCNC: 4.5 MMOL/L (ref 3.5–5.2)
PROT SERPL-MCNC: 7.9 G/DL (ref 6–8.5)
RBC # BLD AUTO: 4.5 X10E6/UL (ref 3.77–5.28)
SODIUM SERPL-SCNC: 143 MMOL/L (ref 134–144)
T4 FREE SERPL-MCNC: 1.3 NG/DL (ref 0.82–1.77)
TRIGL SERPL-MCNC: 62 MG/DL (ref 0–149)
TSH SERPL DL<=0.005 MIU/L-ACNC: 3.04 UIU/ML (ref 0.45–4.5)
VLDLC SERPL CALC-MCNC: 12 MG/DL (ref 5–40)
WBC # BLD AUTO: 5.6 X10E3/UL (ref 3.4–10.8)

## 2019-05-14 DIAGNOSIS — E78.5 HYPERLIPIDEMIA, UNSPECIFIED HYPERLIPIDEMIA TYPE: ICD-10-CM

## 2019-05-14 RX ORDER — ROSUVASTATIN CALCIUM 5 MG/1
TABLET, COATED ORAL
Qty: 30 TAB | Refills: 5 | Status: SHIPPED | OUTPATIENT
Start: 2019-05-14 | End: 2019-11-05 | Stop reason: SDUPTHER

## 2019-07-15 DIAGNOSIS — E03.9 ACQUIRED HYPOTHYROIDISM: ICD-10-CM

## 2019-07-15 RX ORDER — LEVOTHYROXINE SODIUM 50 UG/1
TABLET ORAL
Qty: 90 TAB | Refills: 1 | Status: SHIPPED | OUTPATIENT
Start: 2019-07-15 | End: 2020-03-25

## 2019-09-23 ENCOUNTER — APPOINTMENT (OUTPATIENT)
Dept: GENERAL RADIOLOGY | Age: 54
End: 2019-09-23
Attending: EMERGENCY MEDICINE
Payer: MEDICARE

## 2019-09-23 ENCOUNTER — HOSPITAL ENCOUNTER (EMERGENCY)
Age: 54
Discharge: HOME | End: 2019-09-23
Attending: EMERGENCY MEDICINE
Payer: MEDICARE

## 2019-09-23 VITALS
TEMPERATURE: 97.7 F | BODY MASS INDEX: 27.97 KG/M2 | HEART RATE: 100 BPM | SYSTOLIC BLOOD PRESSURE: 127 MMHG | WEIGHT: 152 LBS | RESPIRATION RATE: 20 BRPM | DIASTOLIC BLOOD PRESSURE: 90 MMHG | OXYGEN SATURATION: 95 % | HEIGHT: 62 IN

## 2019-09-23 DIAGNOSIS — S90.121A CONTUSION OF FIFTH TOE OF RIGHT FOOT, INITIAL ENCOUNTER: Primary | ICD-10-CM

## 2019-09-23 DIAGNOSIS — S92.515A CLOSED NONDISPLACED FRACTURE OF PROXIMAL PHALANX OF LESSER TOE OF LEFT FOOT, INITIAL ENCOUNTER: ICD-10-CM

## 2019-09-23 PROCEDURE — 73660 X-RAY EXAM OF TOE(S): CPT

## 2019-09-23 PROCEDURE — 99283 EMERGENCY DEPT VISIT LOW MDM: CPT

## 2019-09-23 RX ORDER — IBUPROFEN 600 MG/1
600 TABLET ORAL
Qty: 20 TAB | Refills: 0 | Status: SHIPPED | OUTPATIENT
Start: 2019-09-23 | End: 2019-12-11

## 2019-09-23 NOTE — ED PROVIDER NOTES
45-year-old female presents to the emergency department with injury to right fifth toe. Patient reports last night she hit her right fifth toe on a table. She is having pain at the right fifth toe. Pain is worse with ambulation or palpation. She reports pain is moderate in severity. No pain in the right ankle or the right knee. No other injuries. No lacerations. Patient denies any falls or head injuries. No vomiting or diarrhea. No fevers.            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 Lolis Montaño Unspecified fetal growth retardation, 1,250-1,499 grams     Unspecified hearing loss     Vitamin D deficiency 2011     (ventriculoperitoneal) shunt status        Past Surgical History:   Procedure Laterality Date    COLONOSCOPY N/A 2018    COLONOS[facility] by [facility]iri [facility]uela [facility] at [facility] ENDOSCOPY    HX COLONOSCOPY      HX GYN  [id number]    fibroid embolization    HX OTHER SURGICAL  [id number]     shunt insertion    HX OTHER SURGICAL  [id number]    shunt replacement    HX OTHER SURGICAL  [id number]    shunt revision    HX OTHER SURGICAL  [id number]    shunt replacement         Family History:   Problem Relation Age of Onset    Heart Attack Father        Social History     Socioeconomic History    Marital status: SINGLE     Spouse name: Not on file    Number of children: Not on file    Years of education: Not on file    Highest education level: Not on file   Occupational History    Not on file   Social Needs    Financial resource strain: Not on file    Food insecurity:     Worry: Not on file     Inability: Not on file   [employer] needs:     Medical: Not on file     Non-medical: Not on file   Tobacco Use    Smoking status: Never Smoker    Smokeless tobacco: Never Used   Substance and Sexual Activity    Alcohol use: Never     Frequency: Never    Drug use: Never    Sexual activity: Not Currently   Lifestyle    Physical activity:     Days per week: Not on file     Minutes per session: Not on file    Stress: Not on file   Relationships    Social connections:     Talks on phone: Not on file     Gets together: Not on file     Attends Sabianist service: Not on file     Active member of club or organization: Not on file     Attends meetings of clubs or organizations: Not on file     Relationship status: Not on file    Intimate partner violence:     Fear of current or ex partner: Not on file     Emotionally abused: Not on file     Physically abused: Not on file     Forced sexual activity: Not on file   Other Topics Concern    Not on file   Social History Narrative    Not on file         ALLERGIES: Pcn [penicillins] and Sulfa (sulfonamide antibiotics)    Review of Systems   Constitutional: Negative for fever. HENT: Negative for facial swelling. Eyes: Negative for pain. Respiratory: Negative for shortness of breath. Cardiovascular: Negative for chest pain and leg swelling. Gastrointestinal: Negative for abdominal pain and vomiting. Endocrine: Negative for polyuria. Genitourinary: Negative for difficulty urinating. Musculoskeletal: Positive for arthralgias and gait problem. Negative for back pain. Skin: Negative for color change. Allergic/Immunologic: Negative for immunocompromised state. Neurological: Negative for headaches. Hematological: Does not bruise/bleed easily. Psychiatric/Behavioral: Negative for confusion. All other systems reviewed and are negative.       Vitals:    09/23/19 1253   BP: 127/90   Pulse: 100   Resp: 20   Temp: 97.7 °F (36.5 °C)   SpO2: 95%   Weight: 68.9 kg (152 lb)   Height: 5' 2\" (1.575 m)            Physical Exam   Constitutional: She is oriented to person, place, and time. She appears well-developed and well-nourished. HENT:   Head: Normocephalic and atraumatic. Right Ear: External ear normal.   Left Ear: External ear normal.   Nose: Nose normal.   Mouth/Throat: Oropharynx is clear and moist.   Eyes: Pupils are equal, round, and reactive to light. EOM are normal. No scleral icterus. Neck: Normal range of motion. Neck supple. No JVD present. No tracheal deviation present. No thyromegaly present. Cardiovascular: Normal rate, regular rhythm, normal heart sounds and intact distal pulses. Exam reveals no friction rub. No murmur heard. Pulmonary/Chest: Effort normal and breath sounds normal. No stridor. No respiratory distress. She has no wheezes. She has no rales. She exhibits no tenderness. Abdominal: Soft. Bowel sounds are normal. She exhibits no distension. There is no tenderness. There is no rebound and no guarding. Musculoskeletal: Normal range of motion. She exhibits tenderness. She exhibits no edema. Tenderness over the right fifth toe to palpation, no deformity, no lacerations, good pulse in the right foot, normal movement of the right foot. Lymphadenopathy:     She has no cervical adenopathy. Neurological: She is alert and oriented to person, place, and time. She has normal reflexes. No cranial nerve deficit or sensory deficit. She exhibits normal muscle tone. Coordination normal.   Skin: Skin is warm and dry. No rash noted. No erythema. Psychiatric: She has a normal mood and affect. Her behavior is normal. Judgment and thought content normal.   Nursing note and vitals reviewed. MDM  Number of Diagnoses or Management Options  Diagnosis management comments: Patient with tenderness over the right fifth toe. Will check x-rays. Differential diagnosis includes fracture, sprain, contusion.        Amount and/or Complexity of Data Reviewed  Tests in the radiology section of CPT®: ordered and reviewed  Decide to obtain previous medical records or to obtain history from someone other than the patient: yes  Review and summarize past medical records: yes  Independent visualization of images, tracings, or specimens: yes           Procedures  1:57 PM  Nondisplaced fracture base of 5th toe on left 5th toe    1:57 PM  I have independently reviewed the patient's xray and have compared my findings with the interpretation from the radiologist.        Cast shoe placed and shane taped toes    Motrin prn    Apply ice to affected area 20 min on and 20 min off    Good return precautions given to patient. Close follow up with PCP recommended. Patient and/or family voices understanding of this plan. Discharge instructions were explained by me and all concerns were addressed.

## 2019-09-23 NOTE — DISCHARGE INSTRUCTIONS
Broken Toe: Care Instructions  Your Care Instructions  You have broken (fractured) a bone in your toe. This kind of fracture does not need a special cast or brace. \"Shane-taping\" your broken toe to a healthy toe next to it is almost always enough to treat the problem and ease symptoms. The toe may take 4 weeks or more to heal.  You heal best when you take good care of yourself. Eat a variety of healthy foods, and don't smoke. Follow-up care is a key part of your treatment and safety. Be sure to make and go to all appointments, and call your doctor if you are having problems. It's also a good idea to know your test results and keep a list of the medicines you take. How can you care for yourself at home? · Be safe with medicines. Take pain medicines exactly as directed. ? If the doctor gave you a prescription medicine for pain, take it as prescribed. ? If you are not taking a prescription pain medicine, ask your doctor if you can take an over-the-counter medicine. · If your toe is taped to the toe next to it, your doctor has shown you how to change the tape. Protect the skin by putting something soft, such as felt or foam, between your toes before you tape them together. Never tape the toes together skin-to-skin. Your broken toe may need to be shane-taped for 2 to 4 weeks to heal.  · Rest and protect your toe. Do not walk on it until you can do so without too much pain. If the doctor has told you to use crutches, use them as instructed. · Put ice or a cold pack on your toe for 10 to 20 minutes at a time. Try to do this every 1 to 2 hours for the next 3 days (when you are awake) or until the swelling goes down. Put a thin cloth between the ice and your skin. · Prop up your foot on a pillow when you ice it or anytime you sit or lie down. Try to keep it above the level of your heart. This will help reduce swelling. · Make sure you go to your follow-up appointments.  Your doctor will need to check that your toe is healing right. When should you call for help? Call your doctor now or seek immediate medical care if:    · You have severe pain.     · Your toe is cool or pale or changes color.     · You have tingling, weakness, or numbness in your toe.    Watch closely for changes in your health, and be sure to contact your doctor if:    · Pain and swelling get worse.     · You are not getting better as expected. Where can you learn more? Go to http://carlos-jono.info/. Enter N843 in the search box to learn more about \"Broken Toe: Care Instructions. \"  Current as of: June 26, 2019  Content Version: 12.2  © 3017-0883 [employer]. Care instructions adapted under license by [employer] (which disclaims liability or warranty for this information). If you have questions about a medical condition or this instruction, always ask your healthcare professional. [place] [address] any warranty or liability for your use of this information. Patient Education        Bruises: Care Instructions  Your Care Instructions    Bruises occur when small blood vessels under the skin tear or rupture, most often from a twist, bump, or fall. Blood leaks into tissues under the skin and causes a black-and-blue spot that often turns colors, including purplish black, reddish blue, or yellowish green, as the bruise heals. Bruises hurt, but most are not serious and will go away on their own within 2 to 4 weeks. Sometimes, gravity causes them to spread down the body. A leg bruise usually will take longer to heal than a bruise on the face or arms. Follow-up care is a key part of your treatment and safety. Be sure to make and go to all appointments, and call your doctor if you are having problems. It's also a good idea to know your test results and keep a list of the medicines you take. How can you care for yourself at home? · Take pain medicines exactly as directed.   ? If the doctor gave you a prescription medicine for pain, take it as prescribed. ? If you are not taking a prescription pain medicine, ask your doctor if you can take an over-the-counter medicine. · Put ice or a cold pack on the area for 10 to 20 minutes at a time. Put a thin cloth between the ice and your skin. · If you can, prop up the bruised area on pillows as much as possible for the next few days. Try to keep the bruise above the level of your heart. When should you call for help? Call your doctor now or seek immediate medical care if:    · You have signs of infection, such as:  ? Increased pain, swelling, warmth, or redness. ? Red streaks leading from the bruise. ? Pus draining from the bruise. ? A fever.     · You have a bruise on your leg and signs of a blood clot, such as:  ? Increasing redness and swelling along with warmth, tenderness, and pain in the bruised area. ? Pain in your calf, back of the knee, thigh, or groin. ? Redness and swelling in your leg or groin.     · Your pain gets worse.    Watch closely for changes in your health, and be sure to contact your doctor if:    · You do not get better as expected. Where can you learn more? Go to http://carlos-jono.info/. Enter [phone] in the search box to learn more about \"Bruises: Care Instructions. \"  Current as of: June 26, 2019  Content Version: 12.2  © 2113-8495 [employer]. Care instructions adapted under license by [employer] (which disclaims liability or warranty for this information). If you have questions about a medical condition or this instruction, always ask your healthcare professional. [place] [address] any warranty or liability for your use of this information.

## 2019-09-23 NOTE — ED NOTES
Attempted to call friends and [facility] for ride home. Unable to reach anyone for transportation home.  Will keep trying

## 2019-10-15 ENCOUNTER — HOSPITAL ENCOUNTER (EMERGENCY)
Age: 54
Discharge: HOME | End: 2019-10-15
Attending: EMERGENCY MEDICINE
Payer: MEDICARE

## 2019-10-15 ENCOUNTER — APPOINTMENT (OUTPATIENT)
Dept: GENERAL RADIOLOGY | Age: 54
End: 2019-10-15
Attending: EMERGENCY MEDICINE
Payer: MEDICARE

## 2019-10-15 ENCOUNTER — APPOINTMENT (OUTPATIENT)
Dept: ULTRASOUND IMAGING | Age: 54
End: 2019-10-15
Attending: EMERGENCY MEDICINE
Payer: MEDICARE

## 2019-10-15 VITALS
RESPIRATION RATE: 18 BRPM | TEMPERATURE: 97.6 F | BODY MASS INDEX: 27.97 KG/M2 | WEIGHT: 152 LBS | OXYGEN SATURATION: 98 % | SYSTOLIC BLOOD PRESSURE: 134 MMHG | HEIGHT: 62 IN | HEART RATE: 109 BPM | DIASTOLIC BLOOD PRESSURE: 80 MMHG

## 2019-10-15 DIAGNOSIS — L03.115 CELLULITIS OF RIGHT LOWER EXTREMITY: ICD-10-CM

## 2019-10-15 DIAGNOSIS — M79.89 FOOT SWELLING: Primary | ICD-10-CM

## 2019-10-15 PROCEDURE — 93971 EXTREMITY STUDY: CPT

## 2019-10-15 PROCEDURE — 99284 EMERGENCY DEPT VISIT MOD MDM: CPT

## 2019-10-15 PROCEDURE — 74011250637 HC RX REV CODE- 250/637: Performed by: EMERGENCY MEDICINE

## 2019-10-15 PROCEDURE — 73630 X-RAY EXAM OF FOOT: CPT

## 2019-10-15 RX ORDER — IBUPROFEN 600 MG/1
600 TABLET ORAL ONCE
Status: COMPLETED | OUTPATIENT
Start: 2019-10-15 | End: 2019-10-15

## 2019-10-15 RX ORDER — CLINDAMYCIN HYDROCHLORIDE 300 MG/1
300 CAPSULE ORAL 2 TIMES DAILY
Qty: 14 CAP | Refills: 0 | Status: SHIPPED | OUTPATIENT
Start: 2019-10-15 | End: 2019-10-22

## 2019-10-15 RX ADMIN — IBUPROFEN 600 MG: 600 TABLET, FILM COATED ORAL at 20:51

## 2019-10-15 NOTE — ED TRIAGE NOTES
Triage: pt is from [facility] and comes for c/o getting bite by possible insect bite to right foot x3-4 days ago. Pt has been itching right foot uncontrollably to the point where she has open wounds and swelling with redness. Pt was here a few weeks ago for broken toe.

## 2019-10-15 NOTE — ED NOTES
Received signout from Dr. Malissa Chew pending right foot x-ray, right lower extremity duplex. Both unremarkable.

## 2019-10-16 NOTE — DISCHARGE INSTRUCTIONS
Cellulitis: Care Instructions  Your Care Instructions    Cellulitis is a skin infection caused by bacteria, most often strep or staph. It often occurs after a break in the skin from a scrape, cut, bite, or puncture, or after a rash. Cellulitis may be treated without doing tests to find out what caused it. But your doctor may do tests, if needed, to look for a specific bacteria, like methicillin-resistant Staphylococcus aureus (MRSA). The doctor has checked you carefully, but problems can develop later. If you notice any problems or new symptoms, get medical treatment right away. Follow-up care is a key part of your treatment and safety. Be sure to make and go to all appointments, and call your doctor if you are having problems. It's also a good idea to know your test results and keep a list of the medicines you take. How can you care for yourself at home? · Take your antibiotics as directed. Do not stop taking them just because you feel better. You need to take the full course of antibiotics. · Prop up the infected area on pillows to reduce pain and swelling. Try to keep the area above the level of your heart as often as you can. · If your doctor told you how to care for your wound, follow your doctor's instructions. If you did not get instructions, follow this general advice:  ? Wash the wound with clean water 2 times a day. Don't use hydrogen peroxide or alcohol, which can slow healing. ? You may cover the wound with a thin layer of petroleum jelly, such as Vaseline, and a nonstick bandage. ? Apply more petroleum jelly and replace the bandage as needed. · Be safe with medicines. Take pain medicines exactly as directed. ? If the doctor gave you a prescription medicine for pain, take it as prescribed. ? If you are not taking a prescription pain medicine, ask your doctor if you can take an over-the-counter medicine.   To prevent cellulitis in the future  · Try to prevent cuts, scrapes, or other injuries to your skin. Cellulitis most often occurs where there is a break in the skin. · If you get a scrape, cut, mild burn, or bite, wash the wound with clean water as soon as you can to help avoid infection. Don't use hydrogen peroxide or alcohol, which can slow healing. · If you have swelling in your legs (edema), support stockings and good skin care may help prevent leg sores and cellulitis. · Take care of your feet, especially if you have diabetes or other conditions that increase the risk of infection. Wear shoes and socks. Do not go barefoot. If you have athlete's foot or other skin problems on your feet, talk to your doctor about how to treat them. When should you call for help? Call your doctor now or seek immediate medical care if:    · You have signs that your infection is getting worse, such as:  ? Increased pain, swelling, warmth, or redness. ? Red streaks leading from the area. ? Pus draining from the area. ? A fever.     · You get a rash.    Watch closely for changes in your health, and be sure to contact your doctor if:    · You do not get better as expected. Where can you learn more? Go to http://carlos-jono.info/. Yesenia Dodge in the search box to learn more about \"Cellulitis: Care Instructions. \"  Current as of: April 1, 2019  Content Version: 12.2  © 4274-5352 [employer]. Care instructions adapted under license by [employer] (which disclaims liability or warranty for this information). If you have questions about a medical condition or this instruction, always ask your healthcare professional. [place] [address] any warranty or liability for your use of this information. Patient Education        Foot Pain: Care Instructions  Your Care Instructions  Foot injuries that cause pain and swelling are fairly common. Almost all sports or home repair projects can cause a misstep that ends up as foot pain.  Normal wear and tear, especially as you get older, also can cause foot pain. Most minor foot injuries will heal on their own, and home treatment is usually all you need to do. If you have a severe injury, you may need tests and treatment. Follow-up care is a key part of your treatment and safety. Be sure to make and go to all appointments, and call your doctor if you are having problems. It's also a good idea to know your test results and keep a list of the medicines you take. How can you care for yourself at home? · Take pain medicines exactly as directed. ? If the doctor gave you a prescription medicine for pain, take it as prescribed. ? If you are not taking a prescription pain medicine, ask your doctor if you can take an over-the-counter medicine. · Rest and protect your foot. Take a break from any activity that may cause pain. · Put ice or a cold pack on your foot for 10 to 20 minutes at a time. Put a thin cloth between the ice and your skin. · Prop up the sore foot on a pillow when you ice it or anytime you sit or lie down during the next 3 days. Try to keep it above the level of your heart. This will help reduce swelling. · Your doctor may recommend that you wrap your foot with an elastic bandage. Keep your foot wrapped for as long as your doctor advises. · If your doctor recommends crutches, use them as directed. · Wear roomy footwear. · As soon as pain and swelling end, begin gentle exercises of your foot. Your doctor can tell you which exercises will help. When should you call for help? Call 911 anytime you think you may need emergency care.  For example, call if:    · Your foot turns pale, white, blue, or cold.    Call your doctor now or seek immediate medical care if:    · You cannot move or stand on your foot.     · Your foot looks twisted or out of its normal position.     · Your foot is not stable when you step down.     · You have signs of infection, such as:  ? Increased pain, swelling, warmth, or redness. ? Red streaks leading from the sore area. ? Pus draining from a place on your foot. ? A fever.     · Your foot is numb or tingly.    Watch closely for changes in your health, and be sure to contact your doctor if:    · You do not get better as expected.     · You have bruises from an injury that last longer than 2 weeks. Where can you learn more? Go to http://carlos-jono.info/. Enter A491 in the search box to learn more about \"Foot Pain: Care Instructions. \"  Current as of: June 26, 2019  Content Version: 12.2  © 8715-0164 [employer]. Care instructions adapted under license by [employer] (which disclaims liability or warranty for this information). If you have questions about a medical condition or this instruction, always ask your healthcare professional. [address] any warranty or liability for your use of this information.

## 2019-10-16 NOTE — ED NOTES
Patient ambulated to and from ER room to restroom with assistance by ED staff in no sign of distress or discomfort.

## 2019-10-16 NOTE — ED NOTES
4th and 5th digits R foot shane taped together and ankle wounds wrapped by Cathy Collet, Paramedic. [facility] here to p/u. Provided with d/c instructions, including picking up rx.

## 2019-10-17 NOTE — ED PROVIDER NOTES
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 She had a fracture of the base of the toe on the fifth. She has some mild erythema as well. She denies any other symptoms at this time.   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 Sin Resides Unspecified fetal growth retardation, 1,250-1,499 grams     Unspecified hearing loss     Vitamin D deficiency 2011     (ventriculoperitoneal) shunt status        Past Surgical History:   Procedure Laterality Date    COLONOSCOPY N/A 2018    COLONOSCOPY performed by Mabel Paul MD at [facility] ENDOSCOPY    HX COLONOSCOPY      HX GYN  429360    fibroid embolization    HX OTHER SURGICAL  522664     shunt insertion    HX OTHER SURGICAL  929703    shunt replacement    HX OTHER SURGICAL  057730    shunt revision    HX OTHER SURGICAL  240585    shunt replacement         Family History:   Problem Relation Age of Onset    Heart Attack Father        Social History     Socioeconomic History    Marital status: SINGLE     Spouse name: Not on file    Number of children: Not on file    Years of education: Not on file    Highest education level: Not on file   Occupational History    Not on file   Social Needs    Financial resource strain: Not on file    Food insecurity:     Worry: Not on file     Inability: Not on file    Transportation needs:     Medical: Not on file     Non-medical: Not on file   Tobacco Use    Smoking status: Never Smoker    Smokeless tobacco: Never Used   Substance and Sexual Activity    Alcohol use: Never     Frequency: Never    Drug use: Never    Sexual activity: Not Currently   Lifestyle    Physical activity:     Days per week: Not on file     Minutes per session: Not on file    Stress: Not on file   Relationships    Social connections:     Talks on phone: Not on file     Gets together: Not on file     Attends Restoration service: Not on file     Active member of club or organization: Not on file     Attends meetings of clubs or organizations: Not on file     Relationship status: Not on file    Intimate partner violence:     Fear of current or ex partner: Not on file     Emotionally abused: Not on file     Physically abused: Not on file     Forced sexual activity: Not on file   Other Topics Concern    Not on file   Social History Narrative    Not on file         ALLERGIES: Pcn [penicillins] and Sulfa (sulfonamide antibiotics)    Review of Systems   All other systems reviewed and are negative. Vitals:    10/15/19 1741 10/15/19 2029   BP: 128/79 134/80   Pulse: (!) 102 (!) 109   Resp: 16 18   Temp: 97.6 °F (36.4 °C) 97.6 °F (36.4 °C)   SpO2: 98% 98%   Weight:  68.9 kg (152 lb)   Height: 5' 2\" (1.575 m) 5' 2.01\" (1.575 m)            Physical Exam   Constitutional: She is oriented to person, place, and time. She appears well-developed and well-nourished. HENT:   Head: Normocephalic and atraumatic. Mouth/Throat: Oropharynx is clear and moist. No oropharyngeal exudate. Eyes: Conjunctivae are normal. No scleral icterus. Neck: Neck supple. No thyromegaly present. Cardiovascular: Normal rate, regular rhythm and normal heart sounds. Exam reveals no gallop and no friction rub. No murmur heard. Pulmonary/Chest: Effort normal and breath sounds normal. No stridor. No respiratory distress. She has no wheezes. She has no rales. Abdominal: Soft. Bowel sounds are normal. There is no tenderness. There is no rebound and no guarding. Musculoskeletal: Normal range of motion. There is erythema about the foot with some mild swelling   Lymphadenopathy:     She has no cervical adenopathy. Neurological: She is alert and oriented to person, place, and time. Skin: Skin is warm and dry. Psychiatric: She has a normal mood and affect. Nursing note and vitals reviewed.        MDM       Procedures

## 2019-11-05 DIAGNOSIS — E78.5 HYPERLIPIDEMIA, UNSPECIFIED HYPERLIPIDEMIA TYPE: ICD-10-CM

## 2019-11-05 RX ORDER — ROSUVASTATIN CALCIUM 5 MG/1
TABLET, COATED ORAL
Qty: 30 TAB | Refills: 5 | Status: SHIPPED | OUTPATIENT
Start: 2019-11-05 | End: 2020-06-04

## 2019-12-11 ENCOUNTER — OFFICE VISIT (OUTPATIENT)
Dept: INTERNAL MEDICINE CLINIC | Age: 54
End: 2019-12-11

## 2019-12-11 ENCOUNTER — HOSPITAL ENCOUNTER (OUTPATIENT)
Dept: LAB | Age: 54
Discharge: HOME | End: 2019-12-11
Payer: MEDICARE

## 2019-12-11 VITALS
TEMPERATURE: 97.4 F | DIASTOLIC BLOOD PRESSURE: 88 MMHG | SYSTOLIC BLOOD PRESSURE: 106 MMHG | HEART RATE: 89 BPM | WEIGHT: 147.2 LBS | BODY MASS INDEX: 27.09 KG/M2 | HEIGHT: 62 IN | OXYGEN SATURATION: 96 % | RESPIRATION RATE: 17 BRPM

## 2019-12-11 DIAGNOSIS — Z23 ENCOUNTER FOR IMMUNIZATION: ICD-10-CM

## 2019-12-11 DIAGNOSIS — G91.9 HYDROCEPHALUS WITH OPERATING SHUNT (HCC): ICD-10-CM

## 2019-12-11 DIAGNOSIS — R60.0 EDEMA OF RIGHT FOOT: ICD-10-CM

## 2019-12-11 DIAGNOSIS — E55.9 VITAMIN D DEFICIENCY: ICD-10-CM

## 2019-12-11 DIAGNOSIS — E03.4 HYPOTHYROIDISM DUE TO ACQUIRED ATROPHY OF THYROID: Primary | ICD-10-CM

## 2019-12-11 DIAGNOSIS — Z00.00 MEDICARE ANNUAL WELLNESS VISIT, SUBSEQUENT: ICD-10-CM

## 2019-12-11 PROBLEM — F81.9 INTELLECTUAL DELAY: Status: ACTIVE | Noted: 2019-12-11

## 2019-12-11 PROCEDURE — 36415 COLL VENOUS BLD VENIPUNCTURE: CPT

## 2019-12-11 PROCEDURE — 84443 ASSAY THYROID STIM HORMONE: CPT

## 2019-12-11 PROCEDURE — 80053 COMPREHEN METABOLIC PANEL: CPT

## 2019-12-11 NOTE — PATIENT INSTRUCTIONS
1. Contact neurosurgical associates to schedule imaging and follow-up. - [phone] 
2. Call and schedule mammogram with Dr. Ankit Magallanes office. Medicare Wellness Visit, Female The best way to live healthy is to have a lifestyle where you eat a well-balanced diet, exercise regularly, limit alcohol use, and quit all forms of tobacco/nicotine, if applicable. Regular preventive services are another way to keep healthy. Preventive services (vaccines, screening tests, monitoring & exams) can help personalize your care plan, which helps you manage your own care. Screening tests can find health problems at the earliest stages, when they are easiest to treat. [facility] follows the current, evidence-based guidelines published by the [facility] ([facility]) when recommending preventive services for our patients. Because we follow these guidelines, sometimes recommendations change over time as research supports it. (For example, mammograms used to be recommended annually. Even though Medicare will still pay for an annual mammogram, the newer guidelines recommend a mammogram every two years for women of average risk). Of course, you and your doctor may decide to screen more often for some diseases, based on your risk and your co-morbidities (chronic disease you are already diagnosed with). Preventive services for you include: - Medicare offers their members a free annual wellness visit, which is time for you and your primary care provider to discuss and plan for your preventive service needs. Take advantage of this benefit every year! 
-All adults over the age of 72 should receive the recommended pneumonia vaccines. Current USPSTF guidelines recommend a series of two vaccines for the best pneumonia protection.  
-All adults should have a flu vaccine yearly and a tetanus vaccine every 10 years. -All adults age 48 and older should receive the shingles vaccines (series of two vaccines). -All adults age 38-68 who are overweight should have a diabetes screening test once every three years.  
-All adults born between [birth year] and 1963 should be screened once for Hepatitis C. 
-Other screening tests and preventive services for persons with diabetes include: an eye exam to screen for diabetic retinopathy, a kidney function test, a foot exam, and stricter control over your cholesterol.  
-Cardiovascular screening for adults with routine risk involves an electrocardiogram (ECG) at intervals determined by your doctor.  
-Colorectal cancer screenings should be done for adults age 54-65 with no increased risk factors for colorectal cancer. There are a number of acceptable methods of screening for this type of cancer. Each test has its own benefits and drawbacks. Discuss with your doctor what is most appropriate for you during your annual wellness visit. The different tests include: colonoscopy (considered the best screening method), a fecal occult blood test, a fecal DNA test, and sigmoidoscopy. 
 
-A bone mass density test is recommended when a woman turns 65 to screen for osteoporosis. This test is only recommended one time, as a screening. Some providers will use this same test as a disease monitoring tool if you already have osteoporosis. -Breast cancer screenings are recommended every other year for women of normal risk, age 54-69. 
-Cervical cancer screenings for women over age 72 are only recommended with certain risk factors. Here is a list of your current Health Maintenance items (your personalized list of preventive services) with a due date: 
Health Maintenance Due Topic Date Due  Shingles Vaccine (1 of 2) 12/24/2017  Mammogram  07/14/2019  Flu Vaccine  08/01/2019 [address] [place] [address] Annual Well Visit  08/22/2019

## 2019-12-11 NOTE — PROGRESS NOTES
KYRIE Ash Che is a 48 y.o. female, she presents today for:    48year old woman affected pre-natally by stroke and with a  shunt in place, presents for follow-up thyroid and well visit. She has known hypothyroidism, is mildly overweight, but has recently lost weight. Treated 2 motnhs ago in ER for cellulitis and toe injury. [facility] an [facility]". Reports that she contacts mother if she has trouble, also her best friend. Other doctors:    - brooke:    - has been going to specialists for stomach problems and foot problems. - neurosurgeon: In a walking shoe today: reports that it took a long time to heal and she had cellulitis, she had edema for a long time. Also had hives \"all over\" and was scratching. Was seen a patient first. Broken bone Is healed, x-ray done 2 weeks ago. Is scared of what it will feel like if she wears shoes ago. Patient thinks she gained weight. Patient has a good appetttie. Her stomach is bothering her more. She has both IBS and diverticulitis. Continues to get headaches, no worse than usual     PMH/PSH: reviewed and updated  Sochx:  reports that she has never smoked. She has never used smokeless tobacco. She reports that she does not drink alcohol or use drugs. Famhx: reviewed and updated     All: Allergies   Allergen Reactions    Pcn [Penicillins] Unknown (comments)     Pt does not remember the reaction and she has taken amoxil without any problems     Sulfa (Sulfonamide Antibiotics) Other (comments)     Not sure      Med:   Current Outpatient Medications   Medication Sig    rosuvastatin (CRESTOR) 5 mg tablet TAKE 1 TABLET BY MOUTH EVERY NIGHT    levothyroxine (SYNTHROID) 50 mcg tablet TAKE 1 TABLET BY MOUTH DAILY BEFORE BREAKFAST    loratadine (CLARITIN) 10 mg tablet Take 1 Tab by mouth daily.  guaiFENesin ER (MUCINEX) 600 mg ER tablet Take 1 Tab by mouth two (2) times a day.     Omeprazole delayed release (PRILOSEC D/R) 20 mg tablet Take 1 Tab by mouth daily. (Patient taking differently: Take 20 mg by mouth daily. Indications: PRN)    ascorbic acid (VITAMIN C) 250 mg tablet Take  by mouth.  MULTI-VITAMIN PO Take  by mouth.  ibuprofen (MOTRIN) 600 mg tablet Take 1 Tab by mouth every six (6) hours as needed for Pain. (Patient taking differently: Take 600 mg by mouth every six (6) hours as needed for Pain. Indications: reports upset her stomach and dos not take any more)    cholecalciferol (VITAMIN D3) 1,000 unit tablet Take 2 Tabs by mouth daily.  calcium carb-vit d2-minerals (CALTRATE PLUS) Tab Take 1 Tab by mouth two (2) times daily (with meals).  meclizine (ANTIVERT) 25 mg tablet Take 1 Tab by mouth three (3) times daily as needed. No current facility-administered medications for this visit. Review of Systems   Constitutional: Negative for chills, fever and malaise/fatigue. HENT: Negative for sore throat. Respiratory: Negative for shortness of breath. Cardiovascular: Negative for chest pain. Genitourinary: Negative for dysuria. Musculoskeletal: Negative for myalgias. PE:  Blood pressure 106/88, pulse 89, temperature 97.4 °F (36.3 °C), temperature source Oral, resp. rate 17, height 5' 2.01\" (1.575 m), weight 147 lb 3.2 oz (66.8 kg), SpO2 96 %. Body mass index is 26.91 kg/m². Physical Exam  Vitals signs and nursing note reviewed. Constitutional:       General: She is not in acute distress. Comments: Uses walker   HENT:      Head: Normocephalic. Right Ear: Tympanic membrane normal.      Left Ear: Tympanic membrane normal.      Nose: No congestion. Mouth/Throat:      Mouth: Mucous membranes are moist.   Eyes:      Conjunctiva/sclera: Conjunctivae normal.   Neck:      Musculoskeletal: Neck supple. Cardiovascular:      Rate and Rhythm: Normal rate and regular rhythm. Heart sounds: Normal heart sounds.    Pulmonary:      Effort: Pulmonary effort is normal.      Breath sounds: Normal breath sounds. Abdominal:      General: Abdomen is flat. There is no distension. Musculoskeletal:      Comments: Right foot in walking shoe, 1+ edema throughout foot and calf. Skin:     General: Skin is warm and dry. Neurological:      Mental Status: She is alert and oriented to person, place, and time. Psychiatric:         Attention and Perception: Attention and perception normal.         Mood and Affect: Affect is labile. Speech: Speech is not tangential.         Behavior: Behavior is hyperactive. Behavior is cooperative. Thought Content: Thought content is delusional.         Cognition and Memory: Cognition normal.         Judgment: Judgment is inappropriate. Labs:   No results found for any visits on 12/11/19. A/P:  48 y.o. female    ICD-10-CM ICD-9-CM    1. Hypothyroidism due to acquired atrophy of thyroid E03.4 244.8 TSH 3RD GENERATION     246.8    2. 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 767.0    3. Vitamin D deficiency U36.5 438.7 METABOLIC PANEL, COMPREHENSIVE   4. Hydrocephalus with operating shunt (HCC) L83.0 931.0 METABOLIC PANEL, COMPREHENSIVE   5. Medicare annual wellness visit, subsequent N26.77 N87.1 METABOLIC PANEL, COMPREHENSIVE   6. Encounter for immunization Z23 V03.89 ADMIN PNEUMOCOCCAL VACCINE      PNEUMOCOCCAL CONJ VACCINE 13 VALENT IM     Hypothyroid: TSH requested to monitor level. Hydrocephalus s/p shunt, with features consistent with possible autism vs ocd and mood disorder.    - follows with a therapist, possibly. - has a  at her current group home at [facility]. - call placed to neurosurgical associates and followed with Dr. Kenan Gonzalez. Plan to contact patient to schedule follow-up    Edema in legs, right more than left, with recent injury to right foot: has been released to wear new shoes but still in walking shoe.  To get compression socks, understands to wear them only     - She was given AVS and expressed understanding with the diagnosis and plan as discussed. Future Appointments   Date Time Provider [place]   2019 10:00 AM Leotha Aase, MD [address] [facility]           This is the Subsequent Medicare Annual Wellness Exam, performed 12 months or more after the Initial AWV or the last Subsequent AWV    I have reviewed the patient's medical history in detail and updated the computerized patient record.      History     Patient Active Problem List   Diagnosis Code    Dizziness R44     (ventriculoperitoneal) shunt status Z98.2    Unspecified hearing loss H91.90    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    Migraine G43.909    Vitamin D deficiency E55.9    Hypothyroidism E03.9    OCD (obsessive compulsive disorder) F42.9    Hyperlipidemia E78.5    Anxiety and depression F41.9, F32.9    Hearing loss H91.90    Allergic rhinitis J30.9    Diverticulosis K57.90    Intellectual delay F81.9    Hydrocephalus with operating shunt ([address] G91.9     Past Medical History:   Diagnosis Date    Cerebral hemorrhage at birth     Diverticulosis     Hearing loss     Hydrocephalus, congenital ([address]     Hypothyroidism 2011    Impacted cerumen     Leiomyoma of uterus, unspecified     Migraine     Mild intellectual disabilities     Myopia     OCD (obsessive compulsive disorder) 2012    Pneumothorax of      Routine gynecological examination     Dr. Rocío Cintron Unspecified fetal growth retardation, 1,250-1,499 grams     Unspecified hearing loss     Vitamin D deficiency 2011     (ventriculoperitoneal) shunt status       Past Surgical History:   Procedure Laterality Date    COLONOSCOPY N/A 2018    [facility]ack [facility] MD at [facility] ENDOSCOPY    HX COLONOSCOPY      HX GYN  243831    fibroid embolization    HX OTHER SURGICAL  434214     shunt insertion    HX OTHER SURGICAL  273180    shunt replacement    HX OTHER SURGICAL  033470    shunt revision    HX OTHER SURGICAL  847330    shunt replacement     Current Outpatient Medications   Medication Sig Dispense Refill    rosuvastatin (CRESTOR) 5 mg tablet TAKE 1 TABLET BY MOUTH EVERY NIGHT 30 Tab 5    levothyroxine (SYNTHROID) 50 mcg tablet TAKE 1 TABLET BY MOUTH DAILY BEFORE BREAKFAST 90 Tab 1    loratadine (CLARITIN) 10 mg tablet Take 1 Tab by mouth daily. 30 Tab 5    guaiFENesin ER (MUCINEX) 600 mg ER tablet Take 1 Tab by mouth two (2) times a day. 30 Tab 1    Omeprazole delayed release (PRILOSEC D/R) 20 mg tablet Take 1 Tab by mouth daily. (Patient taking differently: Take 20 mg by mouth daily. Indications: PRN) 90 Tab 3    ascorbic acid (VITAMIN C) 250 mg tablet Take  by mouth.  MULTI-VITAMIN PO Take  by mouth.  ibuprofen (MOTRIN) 600 mg tablet Take 1 Tab by mouth every six (6) hours as needed for Pain. (Patient taking differently: Take 600 mg by mouth every six (6) hours as needed for Pain. Indications: reports upset her stomach and dos not take any more) 20 Tab 0    cholecalciferol (VITAMIN D3) 1,000 unit tablet Take 2 Tabs by mouth daily. 60 Tab 11    calcium carb-vit d2-minerals (CALTRATE PLUS) Tab Take 1 Tab by mouth two (2) times daily (with meals). 60 Tab 11    meclizine (ANTIVERT) 25 mg tablet Take 1 Tab by mouth three (3) times daily as needed.  15 Tab 1     Allergies   Allergen Reactions    Pcn [Penicillins] Unknown (comments)     Pt does not remember the reaction and she has taken amoxil without any problems     Sulfa (Sulfonamide Antibiotics) Other (comments)     Not sure        Family History   Problem Relation Age of Onset    Heart Attack Father      Social History     Tobacco Use    Smoking status: Never Smoker    Smokeless tobacco: Never Used   Substance Use Topics    Alcohol use: Never     Frequency: Never       Depression Risk Factor Screening:     3 most recent PHQ Screens 8/21/2018   PHQ Not Done -   Little interest or pleasure in doing things More than half the days Feeling down, depressed, irritable, or hopeless Nearly every day   Total Score PHQ 2 5   - talks to someone about mood, but she doesn't want to follow their advice. \"I don't believe   Alcohol Risk Factor Screening:   Do you average 1 drink per night or more than 7 drinks a week:  No    On any one occasion in the past three months have you have had more than 3 drinks containing alcohol:  No      Functional Ability and Level of Safety:   Hearing: hard of hearing bilateral in ears. \"I don't like my hearing aids\"    Activities of Daily Living: The home contains: handrails, grab bars and uses wheeled walker  Patient needs help with:  transportation, preparing meals, housework and managing money    Ambulation: with difficulty, uses a walker    Fall Risk:  Fall Risk Assessment, last 12 mths 12/11/2019   Able to walk? Yes   Fall in past 12 months? No     Abuse Screen:  Patient is not abused    Cognitive Screening   Has your family/caregiver stated any concerns about your memory: no    Patient Care Team   Patient Care Team:  Deyanira Duran MD as PCP - General (Internal Medicine)  Deyanira Druan MD as PCP - [facility] Empaneled Provider  Claude Pont, MD (Obstetrics & Gynecology)    Assessment/Plan   Education and counseling provided:  Are appropriate based on today's review and evaluation    Diagnoses and all orders for this visit:    1. Hypothyroidism due to acquired atrophy of thyroid    2. Cerebral hemorrhage at birth    3. Vitamin D deficiency    4. Hydrocephalus with operating shunt ([facility] [address]    5. Intellectual delay    6. Medicare annual wellness visit, subsequent    7. Encounter for immunization  -     67 [address]    Well woman (non-gyn) exam: history and exam revealed issues as noted below. Cancer screening:    - mammo and pap done with gyn. Advised to schedule, patient agreeable. - colonoscopy completed in 2018 and normal  Vaccine status: [id number] given presence of shunt.  Will be eligible for ppsv23 in 8 weeks, but for coverage may consider waiting 1 year. Cardiovascular risk: BP well controlled, lipid screen - on statin. Bone health: daily vit D supplement. Diet and Exercise: not drinking sugary beverages. Patient able to clearly listen and understand. She has delusions and obsessions, and possibly autism like disorder.     Health Maintenance Due   Topic Date Due    Shingrix Vaccine Age 49> (1 of 2) 12/24/2017    BREAST CANCER SCRN MAMMOGRAM  07/14/2019    Influenza Age 9 to Adult  08/01/2019    MEDICARE YEARLY EXAM  08/22/2019

## 2019-12-11 NOTE — PROGRESS NOTES
RM 2    Chief Complaint   Patient presents with    Annual Wellness Visit     [facility] wellness visit     Follow-up     Thyroid        1. Have you been to the ER, urgent care clinic since your last visit? Hospitalized since your last visit? Patient for Broken toe and tendonitis, and Hives     2. Have you seen or consulted any other health care providers outside of the [address] [place] [address] [place] since your last visit? Include any pap smears or colon screening. Health Maintenance Due   Topic Date Due    Shingrix Vaccine Age 49> (1 of 2) 12/24/2017    BREAST CANCER SCRN MAMMOGRAM  07/14/2019    Influenza Age 5 to Adult  08/01/2019    MEDICARE YEARLY EXAM  08/22/2019     Patient already got flu vaccine     3 most recent PHQ Screens 8/21/2018   PHQ Not Done -   Little interest or pleasure in doing things More than half the days   Feeling down, depressed, irritable, or hopeless Nearly every day   Total Score PHQ 2 5     Fall Risk Assessment, last 12 mths 12/11/2019   Able to walk? Yes   Fall in past 12 months? No     Abuse Screening Questionnaire 12/11/2019   Do you ever feel afraid of your partner? N   Are you in a relationship with someone who physically or mentally threatens you? N   Is it safe for you to go home?  Y     ADL Assessment 12/11/2019   Feeding yourself No Help Needed   Getting from bed to chair No Help Needed   Getting dressed No Help Needed   Bathing or showering No Help Needed   Walk across the room (includes cane/walker) Help Needed   Using the telphone No Help Needed   Taking your medications No Help Needed   Preparing meals No Help Needed   Managing money (expenses/bills) Help Needed   Moderately strenuous housework (laundry) Help Needed   Shopping for personal items (toiletries/medicines) Help Needed   Shopping for groceries Help Needed   Driving Help Needed   Climbing a flight of stairs Help Needed   Getting to places beyond walking distances Help Needed       Learning Assessment 5/9/2018 PRIMARY LEARNER Patient   HIGHEST LEVEL OF EDUCATION - PRIMARY LEARNER  SOME COLLEGE   BARRIERS PRIMARY LEARNER NONE   CO-LEARNER CAREGIVER No   PRIMARY LANGUAGE ENGLISH   LEARNER PREFERENCE PRIMARY READING   ANSWERED BY patient   RELATIONSHIP SELF

## 2019-12-12 LAB
ALBUMIN SERPL-MCNC: 4.5 G/DL (ref 3.5–5.5)
ALBUMIN/GLOB SERPL: 1.5 {RATIO} (ref 1.2–2.2)
ALP SERPL-CCNC: 112 IU/L (ref 39–117)
ALT SERPL-CCNC: 9 IU/L (ref 0–32)
AST SERPL-CCNC: 6 IU/L (ref 0–40)
BILIRUB SERPL-MCNC: 0.4 MG/DL (ref 0–1.2)
BUN SERPL-MCNC: 9 MG/DL (ref 6–24)
BUN/CREAT SERPL: 13 (ref 9–23)
CALCIUM SERPL-MCNC: 10 MG/DL (ref 8.7–10.2)
CHLORIDE SERPL-SCNC: 106 MMOL/L (ref 96–106)
CO2 SERPL-SCNC: 21 MMOL/L (ref 20–29)
CREAT SERPL-MCNC: 0.71 MG/DL (ref 0.57–1)
GLOBULIN SER CALC-MCNC: 3 G/DL (ref 1.5–4.5)
GLUCOSE SERPL-MCNC: 81 MG/DL (ref 65–99)
POTASSIUM SERPL-SCNC: 4.5 MMOL/L (ref 3.5–5.2)
PROT SERPL-MCNC: 7.5 G/DL (ref 6–8.5)
SODIUM SERPL-SCNC: 143 MMOL/L (ref 134–144)
TSH SERPL DL<=0.005 MIU/L-ACNC: 1.59 UIU/ML (ref 0.45–4.5)

## 2019-12-23 ENCOUNTER — DOCUMENTATION ONLY (OUTPATIENT)
Dept: INTERNAL MEDICINE CLINIC | Age: 54
End: 2019-12-23

## 2019-12-23 NOTE — PROGRESS NOTES
Forms faxed to [address] [place] [address]. Confirmation received. Document placed in bin for centralized scanning.     FAx # [phone]

## 2020-03-25 DIAGNOSIS — E03.9 ACQUIRED HYPOTHYROIDISM: ICD-10-CM

## 2020-03-25 RX ORDER — LEVOTHYROXINE SODIUM 50 UG/1
TABLET ORAL
Qty: 90 TAB | Refills: 1 | Status: SHIPPED | OUTPATIENT
Start: 2020-03-25 | End: 2020-11-30 | Stop reason: SDUPTHER

## 2020-06-04 DIAGNOSIS — E78.5 HYPERLIPIDEMIA, UNSPECIFIED HYPERLIPIDEMIA TYPE: ICD-10-CM

## 2020-06-04 RX ORDER — ROSUVASTATIN CALCIUM 5 MG/1
TABLET, COATED ORAL
Qty: 30 TAB | Refills: 5 | Status: SHIPPED | OUTPATIENT
Start: 2020-06-04 | End: 2020-12-22 | Stop reason: SDUPTHER

## 2020-11-17 ENCOUNTER — TELEPHONE (OUTPATIENT)
Dept: INTERNAL MEDICINE CLINIC | Age: 55
End: 2020-11-17

## 2020-11-17 ENCOUNTER — OFFICE VISIT (OUTPATIENT)
Dept: INTERNAL MEDICINE CLINIC | Age: 55
End: 2020-11-17
Payer: MEDICARE

## 2020-11-17 VITALS
OXYGEN SATURATION: 98 % | WEIGHT: 158 LBS | SYSTOLIC BLOOD PRESSURE: 112 MMHG | TEMPERATURE: 97.9 F | RESPIRATION RATE: 18 BRPM | HEART RATE: 85 BPM | BODY MASS INDEX: 31.02 KG/M2 | DIASTOLIC BLOOD PRESSURE: 75 MMHG | HEIGHT: 60 IN

## 2020-11-17 DIAGNOSIS — K21.9 GASTROESOPHAGEAL REFLUX DISEASE, UNSPECIFIED WHETHER ESOPHAGITIS PRESENT: Primary | ICD-10-CM

## 2020-11-17 DIAGNOSIS — E55.9 VITAMIN D DEFICIENCY: ICD-10-CM

## 2020-11-17 DIAGNOSIS — G91.9 HYDROCEPHALUS WITH OPERATING SHUNT (HCC): ICD-10-CM

## 2020-11-17 DIAGNOSIS — Z12.31 ENCOUNTER FOR SCREENING MAMMOGRAM FOR BREAST CANCER: ICD-10-CM

## 2020-11-17 DIAGNOSIS — Z98.2 VP (VENTRICULOPERITONEAL) SHUNT STATUS: ICD-10-CM

## 2020-11-17 DIAGNOSIS — H91.8X3 OTHER SPECIFIED HEARING LOSS OF BOTH EARS: ICD-10-CM

## 2020-11-17 DIAGNOSIS — R13.19 ESOPHAGEAL DYSPHAGIA: ICD-10-CM

## 2020-11-17 DIAGNOSIS — J30.9 ALLERGIC RHINITIS, UNSPECIFIED SEASONALITY, UNSPECIFIED TRIGGER: ICD-10-CM

## 2020-11-17 DIAGNOSIS — K21.9 GASTROESOPHAGEAL REFLUX DISEASE: ICD-10-CM

## 2020-11-17 DIAGNOSIS — E03.9 ACQUIRED HYPOTHYROIDISM: ICD-10-CM

## 2020-11-17 DIAGNOSIS — E78.5 HYPERLIPIDEMIA, UNSPECIFIED HYPERLIPIDEMIA TYPE: ICD-10-CM

## 2020-11-17 PROCEDURE — G0463 HOSPITAL OUTPT CLINIC VISIT: HCPCS | Performed by: INTERNAL MEDICINE

## 2020-11-17 PROCEDURE — G9717 DOC PT DX DEP/BP F/U NT REQ: HCPCS | Performed by: INTERNAL MEDICINE

## 2020-11-17 PROCEDURE — 3017F COLORECTAL CA SCREEN DOC REV: CPT | Performed by: INTERNAL MEDICINE

## 2020-11-17 PROCEDURE — G8417 CALC BMI ABV UP PARAM F/U: HCPCS | Performed by: INTERNAL MEDICINE

## 2020-11-17 PROCEDURE — G9899 SCRN MAM PERF RSLTS DOC: HCPCS | Performed by: INTERNAL MEDICINE

## 2020-11-17 PROCEDURE — G8427 DOCREV CUR MEDS BY ELIG CLIN: HCPCS | Performed by: INTERNAL MEDICINE

## 2020-11-17 PROCEDURE — 99214 OFFICE O/P EST MOD 30 MIN: CPT | Performed by: INTERNAL MEDICINE

## 2020-11-17 RX ORDER — SUCRALFATE 1 G/10ML
1 SUSPENSION ORAL
Qty: 900 ML | Refills: 2 | Status: SHIPPED | OUTPATIENT
Start: 2020-11-17 | End: 2020-12-22 | Stop reason: ALTCHOICE

## 2020-11-17 RX ORDER — PHENOL/SODIUM PHENOLATE
20 AEROSOL, SPRAY (ML) MUCOUS MEMBRANE DAILY
Qty: 90 TAB | Refills: 3 | Status: SHIPPED | OUTPATIENT
Start: 2020-11-17 | End: 2020-11-18 | Stop reason: CLARIF

## 2020-11-17 NOTE — LETTER
NOTIFICATION RETURN TO WORK / SCHOOL 
 
11/17/2020 3:04 PM 
 
Ms. Medina Drummond [address] [id number] To Whom It May Concern: 
 
Medina Drummond is currently under the care of [facility]. She will return to work/school on: 11/17/20 If there are questions or concerns please have the patient contact our office. Sincerely, Rebeca Benitez MD

## 2020-11-17 NOTE — PROGRESS NOTES
HPI:  Presents for f/u vaccine, GI symptoms, thyroid, etc    Chest pain with swallowing x several months  No emesis, no nausea  Worse with foods  Not on PPI     Variable thyroid dosing acknowledged  But, taking at least every other day    Pt report more consistent cholesterol medication    Pt reports $100 copay for shingrix doses so she has deferred it    Pt desires pneumovax 23, but had prevnar < 12 months ago and had Reginald Juan R. Past medical, Social, and Family history reviewed    Prior to Admission medications    Medication Sig Start Date End Date Taking? Authorizing Provider   rosuvastatin (CRESTOR) 5 mg tablet TAKE 1 TABLET BY MOUTH EVERY NIGHT 6/4/20  Yes Miguel Farooq MD   levothyroxine (SYNTHROID) 50 mcg tablet TAKE 1 TABLET BY MOUTH DAILY BEFORE BREAKFAST 3/25/20  Yes Miguel Farooq MD   loratadine (CLARITIN) 10 mg tablet Take 1 Tab by mouth daily. 12/14/18   Miguel Farooq MD   guaiFENesin ER (MUCINEX) 600 mg ER tablet Take 1 Tab by mouth two (2) times a day. 12/14/18   Miguel Farooq MD   cholecalciferol (VITAMIN D3) 1,000 unit tablet Take 2 Tabs by mouth daily. 8/22/18   Miguel Farooq MD   Omeprazole delayed release (PRILOSEC D/R) 20 mg tablet Take 1 Tab by mouth daily. Patient taking differently: Take 20 mg by mouth daily. Indications: PRN 5/9/18   Miguel Farooq MD   ascorbic acid (VITAMIN C) 250 mg tablet Take  by mouth. Provider, Historical   MULTI-VITAMIN PO Take  by mouth. Provider, Historical          ROS  Complete ROS reviewed and negative or stable except as noted in HPI. Physical Exam  Vitals signs and nursing note reviewed. Constitutional:       General: She is not in acute distress. Comments: Dysarthria - baseline   HENT:      Right Ear: Decreased hearing noted. Left Ear: Decreased hearing noted. Eyes:      General: No scleral icterus. Pupils: Pupils are equal, round, and reactive to light.    Neck:      Musculoskeletal: Normal range of motion and neck supple. Thyroid: No thyromegaly. Vascular: No JVD. Comments: Right sided shunt palpable and nontender  Cardiovascular:      Rate and Rhythm: Normal rate and regular rhythm. Heart sounds: Normal heart sounds. No murmur. No friction rub. No gallop. Pulmonary:      Effort: Pulmonary effort is normal. No respiratory distress. Breath sounds: Normal breath sounds. No wheezing or rales. Abdominal:      General: Bowel sounds are normal. There is no distension. Palpations: Abdomen is soft. There is no mass. Tenderness: There is no abdominal tenderness. There is no guarding. Musculoskeletal: Normal range of motion. Lymphadenopathy:      Cervical: No cervical adenopathy. Skin:     General: Skin is warm. Findings: No rash. Neurological:      Mental Status: She is alert and oriented to person, place, and time. Motor: No abnormal muscle tone. Prior labs reviewed. Assessment/Plan:    ICD-10-CM ICD-9-CM    1. Gastroesophageal reflux disease, unspecified whether esophagitis present  K21.9 530.81 Omeprazole delayed release (PRILOSEC D/R) 20 mg tablet      sucralfate (CARAFATE) 100 mg/mL suspension      REFERRAL TO GASTROENTEROLOGY      CBC WITH AUTOMATED DIFF   2. Hyperlipidemia, unspecified hyperlipidemia type  E78.5 272.4 CK      LIPID PANEL      METABOLIC PANEL, COMPREHENSIVE   3. Hydrocephalus with operating shunt (HCC)  G91.9 331.4    4. Acquired hypothyroidism  E03.9 244.9 T4, FREE      TSH 3RD GENERATION   5. Vitamin D deficiency  E55.9 268.9 VITAMIN D, 25 HYDROXY   6. Allergic rhinitis, unspecified seasonality, unspecified trigger  J30.9 477.9    7.  (ventriculoperitoneal) shunt status  Z98.2 V45.2    8. Other specified hearing loss of both ears  H91.8X3 389.8    9. Gastroesophageal reflux disease  K21.9 530.81    10.  Esophageal dysphagia  R13.10 787.20 Omeprazole delayed release (PRILOSEC D/R) 20 mg tablet      sucralfate (CARAFATE) 100 mg/mL suspension      REFERRAL TO GASTROENTEROLOGY   11. Encounter for screening mammogram for breast cancer  Z12.31 V76.12 KIRT 3D RACHELL W MAMMO BI SCREENING INCL CAD     Follow-up and Dispositions    · Return in about 1 month (around 12/17/2020), or if symptoms worsen or fail to improve, for Medicare Wellness Visit, cholesterol, thyroid, pneumovax. results and schedule of future studies reviewed with patient  reviewed diet, exercise and weight   cardiovascular risk and specific lipid/LDL goals reviewed  reviewed medications and side effects in detail     Resume PPI  Add carafate for short term  Ref to GI- consider EGD, chrissie if symptoms persist  Defer pneumovax 23 until after 12/11/20 to avoid potential coverage issue with medicare.   Return for fasting labs to include thyroid   Obtain PAP records from [address] [place] [address] - Dr. Yobani Mares ordered

## 2020-11-17 NOTE — PROGRESS NOTES
RM # 13    Chest pain when she swallows    Chief Complaint   Patient presents with    Abdominal Pain     Acid reflex and IBS    Immunization/Injection     second Pneumonia Vaccine       1. Have you been to the ER, urgent care clinic since your last visit? Hospitalized since your last visit? No    2. Have you seen or consulted any other health care providers outside of the [address] [place] [address] since your last visit? Include any pap smears or colon screening. No    Health Maintenance Due   Topic Date Due    Shingrix Vaccine Age 49> (1 of 2) 12/24/2017    Breast Cancer Screen Mammogram  07/14/2019    Lipid Screen  04/18/2020    PAP AKA CERVICAL CYTOLOGY  07/14/2020    Flu Vaccine (1) 09/01/2020    Medicare Yearly Exam  12/11/2020       Learning Assessment 5/9/2018   PRIMARY LEARNER Patient   HIGHEST LEVEL OF EDUCATION - PRIMARY LEARNER  SOME COLLEGE   BARRIERS PRIMARY LEARNER NONE   CO-LEARNER CAREGIVER No   PRIMARY LANGUAGE ENGLISH   LEARNER PREFERENCE PRIMARY READING   ANSWERED BY patient   RELATIONSHIP SELF       After obtaining consent, and per orders of Dr. Preet Lacy, injection of Pneumonia vaccine given by Eloise Vasquez. Patient instructed to remain in clinic for 20 minutes afterwards, and to report any adverse reaction to me immediately. AVS, education, follow up and recommendations provided and addressed with patient.   services used to advise patient no

## 2020-11-18 ENCOUNTER — TELEPHONE (OUTPATIENT)
Dept: INTERNAL MEDICINE CLINIC | Age: 55
End: 2020-11-18

## 2020-11-18 DIAGNOSIS — R13.19 ESOPHAGEAL DYSPHAGIA: ICD-10-CM

## 2020-11-18 DIAGNOSIS — K21.9 GASTROESOPHAGEAL REFLUX DISEASE, UNSPECIFIED WHETHER ESOPHAGITIS PRESENT: ICD-10-CM

## 2020-11-18 RX ORDER — OMEPRAZOLE 20 MG/1
20 CAPSULE, DELAYED RELEASE ORAL DAILY
Qty: 90 CAP | Refills: 3
Start: 2020-11-18 | End: 2021-12-03

## 2020-11-18 NOTE — TELEPHONE ENCOUNTER
Pharmacy reports insurance covering capsules and not the tablet form.  Gave verbal to switch to capsules for insurance coverage

## 2020-11-18 NOTE — TELEPHONE ENCOUNTER
----- Message from Andres Posada sent at 11/18/2020  1:08 PM EST -----  Regarding: Dr Gabriella Mansfield is calling to get the doctor to change the Omeprazole to capsule , please call into [phone]

## 2020-11-30 DIAGNOSIS — E03.9 ACQUIRED HYPOTHYROIDISM: ICD-10-CM

## 2020-11-30 RX ORDER — LEVOTHYROXINE SODIUM 50 UG/1
50 TABLET ORAL
Qty: 90 TAB | Refills: 1 | Status: SHIPPED | OUTPATIENT
Start: 2020-11-30 | End: 2022-03-06

## 2020-11-30 NOTE — TELEPHONE ENCOUNTER
Last visit 11/17/2020 MD Deann Montaño   Next appointment 12/22/2020 MD Deann Montaño   Previous refill encounter(s)   03/25/2020 Synthroid #90 with 1 refill. Requested Prescriptions     Pending Prescriptions Disp Refills    levothyroxine (SYNTHROID) 50 mcg tablet 90 Tab 1     Sig: Take 1 Tab by mouth Daily (before breakfast).

## 2020-12-16 ENCOUNTER — APPOINTMENT (OUTPATIENT)
Dept: INTERNAL MEDICINE CLINIC | Age: 55
End: 2020-12-16

## 2020-12-16 DIAGNOSIS — E78.5 HYPERLIPIDEMIA, UNSPECIFIED HYPERLIPIDEMIA TYPE: ICD-10-CM

## 2020-12-16 DIAGNOSIS — E55.9 VITAMIN D DEFICIENCY: ICD-10-CM

## 2020-12-16 DIAGNOSIS — E03.9 ACQUIRED HYPOTHYROIDISM: ICD-10-CM

## 2020-12-16 DIAGNOSIS — K21.9 GASTROESOPHAGEAL REFLUX DISEASE, UNSPECIFIED WHETHER ESOPHAGITIS PRESENT: ICD-10-CM

## 2020-12-16 LAB
25(OH)D3 SERPL-MCNC: 10.1 NG/ML (ref 30–100)
ALBUMIN SERPL-MCNC: 4.2 G/DL (ref 3.5–5)
ALBUMIN/GLOB SERPL: 1.1 {RATIO} (ref 1.1–2.2)
ALP SERPL-CCNC: 118 U/L (ref 45–117)
ALT SERPL-CCNC: 15 U/L (ref 12–78)
ANION GAP SERPL CALC-SCNC: 4 MMOL/L (ref 5–15)
AST SERPL-CCNC: <3 U/L (ref 15–37)
BASOPHILS # BLD: 0 K/UL (ref 0–0.1)
BASOPHILS NFR BLD: 1 % (ref 0–1)
BILIRUB SERPL-MCNC: 0.4 MG/DL (ref 0.2–1)
BUN SERPL-MCNC: 16 MG/DL (ref 6–20)
BUN/CREAT SERPL: 20 (ref 12–20)
CALCIUM SERPL-MCNC: 9.5 MG/DL (ref 8.5–10.1)
CHLORIDE SERPL-SCNC: 107 MMOL/L (ref 97–108)
CHOLEST SERPL-MCNC: 165 MG/DL
CK SERPL-CCNC: 88 U/L (ref 26–192)
CO2 SERPL-SCNC: 28 MMOL/L (ref 21–32)
CREAT SERPL-MCNC: 0.82 MG/DL (ref 0.55–1.02)
DIFFERENTIAL METHOD BLD: NORMAL
EOSINOPHIL # BLD: 0.1 K/UL (ref 0–0.4)
EOSINOPHIL NFR BLD: 3 % (ref 0–7)
ERYTHROCYTE [DISTWIDTH] IN BLOOD BY AUTOMATED COUNT: 11.6 % (ref 11.5–14.5)
GLOBULIN SER CALC-MCNC: 3.7 G/DL (ref 2–4)
GLUCOSE SERPL-MCNC: 87 MG/DL (ref 65–100)
HCT VFR BLD AUTO: 42 % (ref 35–47)
HDLC SERPL-MCNC: 77 MG/DL
HDLC SERPL: 2.1 {RATIO} (ref 0–5)
HGB BLD-MCNC: 13.2 G/DL (ref 11.5–16)
IMM GRANULOCYTES # BLD AUTO: 0 K/UL (ref 0–0.04)
IMM GRANULOCYTES NFR BLD AUTO: 0 % (ref 0–0.5)
LDLC SERPL CALC-MCNC: 79.2 MG/DL (ref 0–100)
LIPID PROFILE,FLP: NORMAL
LYMPHOCYTES # BLD: 1.3 K/UL (ref 0.8–3.5)
LYMPHOCYTES NFR BLD: 24 % (ref 12–49)
MCH RBC QN AUTO: 30.6 PG (ref 26–34)
MCHC RBC AUTO-ENTMCNC: 31.4 G/DL (ref 30–36.5)
MCV RBC AUTO: 97.2 FL (ref 80–99)
MONOCYTES # BLD: 0.4 K/UL (ref 0–1)
MONOCYTES NFR BLD: 7 % (ref 5–13)
NEUTS SEG # BLD: 3.6 K/UL (ref 1.8–8)
NEUTS SEG NFR BLD: 65 % (ref 32–75)
NRBC # BLD: 0 K/UL (ref 0–0.01)
NRBC BLD-RTO: 0 PER 100 WBC
PLATELET # BLD AUTO: 291 K/UL (ref 150–400)
PMV BLD AUTO: 10.9 FL (ref 8.9–12.9)
POTASSIUM SERPL-SCNC: 4.9 MMOL/L (ref 3.5–5.1)
PROT SERPL-MCNC: 7.9 G/DL (ref 6.4–8.2)
RBC # BLD AUTO: 4.32 M/UL (ref 3.8–5.2)
SODIUM SERPL-SCNC: 139 MMOL/L (ref 136–145)
T4 FREE SERPL-MCNC: 1.2 NG/DL (ref 0.8–1.5)
TRIGL SERPL-MCNC: 44 MG/DL (ref ?–150)
TSH SERPL DL<=0.05 MIU/L-ACNC: 4.28 UIU/ML (ref 0.36–3.74)
VLDLC SERPL CALC-MCNC: 8.8 MG/DL
WBC # BLD AUTO: 5.6 K/UL (ref 3.6–11)

## 2020-12-22 ENCOUNTER — TELEPHONE (OUTPATIENT)
Dept: INTERNAL MEDICINE CLINIC | Age: 55
End: 2020-12-22

## 2020-12-22 ENCOUNTER — OFFICE VISIT (OUTPATIENT)
Dept: INTERNAL MEDICINE CLINIC | Age: 55
End: 2020-12-22
Payer: MEDICARE

## 2020-12-22 VITALS
HEART RATE: 87 BPM | BODY MASS INDEX: 30.94 KG/M2 | OXYGEN SATURATION: 95 % | TEMPERATURE: 97 F | HEIGHT: 60 IN | RESPIRATION RATE: 18 BRPM | SYSTOLIC BLOOD PRESSURE: 118 MMHG | WEIGHT: 157.6 LBS | DIASTOLIC BLOOD PRESSURE: 74 MMHG

## 2020-12-22 DIAGNOSIS — R13.19 ESOPHAGEAL DYSPHAGIA: ICD-10-CM

## 2020-12-22 DIAGNOSIS — E03.9 ACQUIRED HYPOTHYROIDISM: ICD-10-CM

## 2020-12-22 DIAGNOSIS — K21.9 GASTROESOPHAGEAL REFLUX DISEASE, UNSPECIFIED WHETHER ESOPHAGITIS PRESENT: ICD-10-CM

## 2020-12-22 DIAGNOSIS — J30.9 ALLERGIC RHINITIS, UNSPECIFIED SEASONALITY, UNSPECIFIED TRIGGER: ICD-10-CM

## 2020-12-22 DIAGNOSIS — E55.9 VITAMIN D DEFICIENCY: ICD-10-CM

## 2020-12-22 DIAGNOSIS — Z00.00 MEDICARE ANNUAL WELLNESS VISIT, SUBSEQUENT: Primary | ICD-10-CM

## 2020-12-22 DIAGNOSIS — G91.9 HYDROCEPHALUS WITH OPERATING SHUNT (HCC): ICD-10-CM

## 2020-12-22 DIAGNOSIS — Z23 ENCOUNTER FOR IMMUNIZATION: ICD-10-CM

## 2020-12-22 DIAGNOSIS — E78.5 HYPERLIPIDEMIA, UNSPECIFIED HYPERLIPIDEMIA TYPE: ICD-10-CM

## 2020-12-22 PROCEDURE — 3017F COLORECTAL CA SCREEN DOC REV: CPT | Performed by: INTERNAL MEDICINE

## 2020-12-22 PROCEDURE — G9899 SCRN MAM PERF RSLTS DOC: HCPCS | Performed by: INTERNAL MEDICINE

## 2020-12-22 PROCEDURE — G0463 HOSPITAL OUTPT CLINIC VISIT: HCPCS | Performed by: INTERNAL MEDICINE

## 2020-12-22 PROCEDURE — G8417 CALC BMI ABV UP PARAM F/U: HCPCS | Performed by: INTERNAL MEDICINE

## 2020-12-22 PROCEDURE — G8427 DOCREV CUR MEDS BY ELIG CLIN: HCPCS | Performed by: INTERNAL MEDICINE

## 2020-12-22 PROCEDURE — G0439 PPPS, SUBSEQ VISIT: HCPCS | Performed by: INTERNAL MEDICINE

## 2020-12-22 PROCEDURE — 90732 PPSV23 VACC 2 YRS+ SUBQ/IM: CPT | Performed by: INTERNAL MEDICINE

## 2020-12-22 PROCEDURE — G9717 DOC PT DX DEP/BP F/U NT REQ: HCPCS | Performed by: INTERNAL MEDICINE

## 2020-12-22 PROCEDURE — 99214 OFFICE O/P EST MOD 30 MIN: CPT | Performed by: INTERNAL MEDICINE

## 2020-12-22 RX ORDER — MULTIVIT-MIN/FERROUS FUMARATE 9 MG/15 ML
15 LIQUID (ML) ORAL DAILY
Qty: 450 ML | Refills: 11 | Status: SHIPPED | OUTPATIENT
Start: 2020-12-22 | End: 2021-01-04 | Stop reason: ALTCHOICE

## 2020-12-22 RX ORDER — ROSUVASTATIN CALCIUM 5 MG/1
TABLET, COATED ORAL
Qty: 30 TAB | Refills: 5 | Status: SHIPPED | OUTPATIENT
Start: 2020-12-22 | End: 2021-07-21

## 2020-12-22 NOTE — TELEPHONE ENCOUNTER
Called pharmacy. Was informed that prescription was sent over as a pediatric dose and needed clarification on patient. Pharmacy also requests if form can be switched to tab as only liquid form that is available is Geritol with Iron. Advised pharmacy provider would be notified to clarify dosage and if order can be placed for tab form instead of liquid.

## 2020-12-22 NOTE — PATIENT INSTRUCTIONS
Medicare Wellness Visit, Female The best way to live healthy is to have a lifestyle where you eat a well-balanced diet, exercise regularly, limit alcohol use, and quit all forms of tobacco/nicotine, if applicable. Regular preventive services are another way to keep healthy. Preventive services (vaccines, screening tests, monitoring & exams) can help personalize your care plan, which helps you manage your own care. Screening tests can find health problems at the earliest stages, when they are easiest to treat. [facility] follows the current, evidence-based guidelines published by the [facility] ([facility]) when recommending preventive services for our patients. Because we follow these guidelines, sometimes recommendations change over time as research supports it. (For example, mammograms used to be recommended annually. Even though Medicare will still pay for an annual mammogram, the newer guidelines recommend a mammogram every two years for women of average risk). Of course, you and your doctor may decide to screen more often for some diseases, based on your risk and your co-morbidities (chronic disease you are already diagnosed with). Preventive services for you include: - Medicare offers their members a free annual wellness visit, which is time for you and your primary care provider to discuss and plan for your preventive service needs. Take advantage of this benefit every year! 
-All adults over the age of 72 should receive the recommended pneumonia vaccines. Current USPSTF guidelines recommend a series of two vaccines for the best pneumonia protection.  
-All adults should have a flu vaccine yearly and a tetanus vaccine every 10 years.  
-All adults age 48 and older should receive the shingles vaccines (series of two vaccines). -All adults age 38-68 who are overweight should have a diabetes screening test once every three years. -All adults born between [birth year] and 1963 should be screened once for Hepatitis C. 
-Other screening tests and preventive services for persons with diabetes include: an eye exam to screen for diabetic retinopathy, a kidney function test, a foot exam, and stricter control over your cholesterol.  
-Cardiovascular screening for adults with routine risk involves an electrocardiogram (ECG) at intervals determined by your doctor.  
-Colorectal cancer screenings should be done for adults age 54-65 with no increased risk factors for colorectal cancer. There are a number of acceptable methods of screening for this type of cancer. Each test has its own benefits and drawbacks. Discuss with your doctor what is most appropriate for you during your annual wellness visit. The different tests include: colonoscopy (considered the best screening method), a fecal occult blood test, a fecal DNA test, and sigmoidoscopy. 
 
-A bone mass density test is recommended when a woman turns 65 to screen for osteoporosis. This test is only recommended one time, as a screening. Some providers will use this same test as a disease monitoring tool if you already have osteoporosis. -Breast cancer screenings are recommended every other year for women of normal risk, age 54-69. 
-Cervical cancer screenings for women over age 72 are only recommended with certain risk factors. Here is a list of your current Health Maintenance items (your personalized list of preventive services) with a due date: 
Health Maintenance Due Topic Date Due  Shingles Vaccine (1 of 2) 12/24/2017  Mammogram  07/14/2019  Pap Test  07/14/2020

## 2020-12-22 NOTE — PROGRESS NOTES
Room 13    Identified pt with two pt identifiers(name and ). Reviewed record in preparation for visit and have obtained necessary documentation. All patient medications has been reviewed. Chief Complaint   Patient presents with    Annual Wellness Visit    Hearing Problem     both ears. Pt does wearing hearing aids. Do not have them on today       Health Maintenance Due   Topic    Shingrix Vaccine Age 50> (1 of 2)    Breast Cancer Screen Mammogram     PAP AKA CERVICAL CYTOLOGY     Medicare Yearly Exam      Pt is due for all HM above. ADL Assessment 2020   Feeding yourself No Help Needed   Getting from bed to chair No Help Needed   Getting dressed No Help Needed   Bathing or showering No Help Needed   Walk across the room (includes cane/walker) No Help Needed   Using the telphone No Help Needed   Taking your medications No Help Needed   Preparing meals No Help Needed   Managing money (expenses/bills) Help Needed   Moderately strenuous housework (laundry) No Help Needed   Shopping for personal items (toiletries/medicines) No Help Needed   Shopping for groceries No Help Needed   Driving Help Needed   Climbing a flight of stairs Help Needed   Getting to places beyond walking distances No Help Needed     3 most recent PHQ Screens 2020   PHQ Not Done -   Little interest or pleasure in doing things Several days   Feeling down, depressed, irritable, or hopeless Several days   Total Score PHQ 2 2     Fall Risk Assessment, last 12 mths 2020   Able to walk? Yes   Fall in past 12 months? No     Abuse Screening Questionnaire 2020   Do you ever feel afraid of your partner? N   Are you in a relationship with someone who physically or mentally threatens you? N   Is it safe for you to go home?  Y       Vitals:    20 0812   BP: 118/74   Pulse: 87   Resp: 18   Temp: 97 °F (36.1 °C)   TempSrc: Oral   SpO2: 95%   Weight: 157 lb 9.6 oz (71.5 kg)   Height: 5' (1.524 m)   PainSc:   0 - No pain Coordination of Care Questionnaire:   1. Have you been to the ER, urgent care clinic since your last visit? Hospitalized since your last visit? No    2. Have you seen or consulted any other health care providers outside of the [address] [place] [address] since your last visit? Include any pap smears or colon screening. No    Advance Care Planning:   End of Life Planning: has NO advanced directive - not interested in additional information, has NO advanced directive  - add't info provided, reviewed DNR/DNI and patient is not interested      Patient is accompanied by self I have received verbal consent from Theador Stalls to discuss any/all medical information while they are present in the room.

## 2020-12-22 NOTE — PROGRESS NOTES
HPI:  Presents for f/u lipids, thyroid, GERD, etc    Pt expresses frustration re: COVID pandemic and her living situation. Pt refuses to wear hearing aids - citing discomfort. GI sx are better with PPI  Not using carafate, though. Thyroid reported as taken regularly in the past couple of weeks only    +statin compliance. Pt increased milk, but not taking vitamin D supplement. Pt reports the pills were irritating her. Past medical, Social, and Family history reviewed    Prior to Admission medications    Medication Sig Start Date End Date Taking? Authorizing Provider   levothyroxine (SYNTHROID) 50 mcg tablet Take 1 Tab by mouth Daily (before breakfast). 11/30/20  Yes Suzanne Serna MD   omeprazole (PRILOSEC) 20 mg capsule Take 1 Cap by mouth daily. 11/18/20  Yes Suzanne Serna MD   sucralfate (CARAFATE) 100 mg/mL suspension Take 10 mL by mouth Before breakfast, lunch, and dinner. 11/17/20  Yes Suzanne Serna MD   rosuvastatin (CRESTOR) 5 mg tablet TAKE 1 TABLET BY MOUTH EVERY NIGHT 6/4/20  Yes Suzanne Serna MD   guaiFENesin ER [facility]) 600 mg ER tablet Take 1 Tab by mouth two (2) times a day. 12/14/18  Yes Suzanne Serna MD   ascorbic acid (VITAMIN C) 250 mg tablet Take  by mouth. Yes Provider, Historical   loratadine (CLARITIN) 10 mg tablet Take 1 Tab by mouth daily. 12/14/18   Suzanne Serna MD   cholecalciferol (VITAMIN D3) 1,000 unit tablet Take 2 Tabs by mouth daily. 8/22/18   Suzanne Serna MD   MULTI-VITAMIN PO Take  by mouth. Provider, Historical          ROS  Complete ROS reviewed and negative or stable except as noted in HPI. Physical Exam  Vitals signs and nursing note reviewed. Constitutional:       General: She is not in acute distress. Comments: Dysarthria - baseline   HENT:      Right Ear: Decreased hearing noted. Left Ear: Decreased hearing noted. Eyes:      General: No scleral icterus.      Pupils: Pupils are equal, round, and reactive to light. Neck:      Musculoskeletal: Normal range of motion and neck supple. Thyroid: No thyromegaly. Vascular: No JVD. Comments: Right sided shunt palpable and nontender  Cardiovascular:      Rate and Rhythm: Normal rate and regular rhythm. Heart sounds: Normal heart sounds. No murmur. No friction rub. No gallop. Pulmonary:      Effort: Pulmonary effort is normal. No respiratory distress. Breath sounds: Normal breath sounds. No wheezing or rales. Abdominal:      General: Bowel sounds are normal. There is no distension. Palpations: Abdomen is soft. There is no mass. Tenderness: There is no abdominal tenderness. There is no guarding. Musculoskeletal: Normal range of motion. Lymphadenopathy:      Cervical: No cervical adenopathy. Skin:     General: Skin is warm. Findings: No rash. Neurological:      Mental Status: She is alert and oriented to person, place, and time. Motor: No abnormal muscle tone. Prior labs reviewed. Thyroid not at goal.      Assessment/Plan:    ICD-10-CM ICD-9-CM    1. Acquired hypothyroidism  E03.9 244.9    2. Gastroesophageal reflux disease, unspecified whether esophagitis present  K21.9 530.81    3. Esophageal dysphagia  R13.10 787.20    4. Hyperlipidemia, unspecified hyperlipidemia type  E78.5 272.4 rosuvastatin (CRESTOR) 5 mg tablet   5. Hydrocephalus with operating shunt (HCC)  G91.9 331.4    6. Vitamin D deficiency  E55.9 268.9 cholecalciferol, vitamin D3, 10 mcg/5 mL (400 unit/5 mL) liqd      DISCONTINUED: multivitamin (MULTI-DELYN, WELLESSE) liqd   7. Allergic rhinitis, unspecified seasonality, unspecified trigger  J30.9 477.9    8. Medicare annual wellness visit, subsequent  Z00.00 V70.0    9.  Encounter for immunization  Z23 V03.89 PNEUMOCOCCAL POLYSACCHARIDE VACCINE, 23-VALENT, ADULT OR IMMUNOSUPPRESSED PT DOSE,     Follow-up and Dispositions    · Return in about 6 months (around 6/22/2021), or if symptoms worsen or fail to improve, for cholesterol, thyroid. results and schedule of future studies reviewed with patient  reviewed diet, exercise and weight   cardiovascular risk and specific lipid/LDL goals reviewed  reviewed medications and side effects in detail    Pneumovax 23  Leave thyroid dose the same for now and encourage compliance  Liquid vitamins to help tolerance and compliance  To give pt # to schedule mammogram.  Pt declines counseling or SSRI - pt cites that \"I am who I am\" and cannot change.   Continue current medications

## 2020-12-22 NOTE — TELEPHONE ENCOUNTER
pharmacy calling regarding multi vitamin liquid   She is not sure the ones perscribed are supposed to be  pediatrics other option is geritol with iron  Best contact [phone]

## 2020-12-22 NOTE — PROGRESS NOTES
This is the Subsequent Medicare Annual Wellness Exam, performed 12 months or more after the Initial AWV or the last Subsequent AWV    I have reviewed the patient's medical history in detail and updated the computerized patient record. Depression Risk Factor Screening:     3 most recent PHQ Screens 12/22/2020   PHQ Not Done -   Little interest or pleasure in doing things Several days   Feeling down, depressed, irritable, or hopeless Several days   Total Score PHQ 2 2       Alcohol Risk Screen    Do you average more than 1 drink per night or more than 7 drinks a week:  No    On any one occasion in the past three months have you have had more than 3 drinks containing alcohol:  No        Functional Ability and Level of Safety:    Hearing: The patient wears hearing aids. The patient needs further evaluation. Activities of Daily Living: The home contains: no safety equipment. Patient needs help with:  transportation, shopping, housework, managing medications and managing money      Ambulation: with difficulty, uses a walker     Fall Risk:  Fall Risk Assessment, last 12 mths 12/22/2020   Able to walk? Yes   Fall in past 12 months? No      Abuse Screen:  Patient is not abused       Cognitive Screening    Has your family/caregiver stated any concerns about your memory: no     At baseline cognitive status    Assessment/Plan   Education and counseling provided:  Are appropriate based on today's review and evaluation    ICD-10-CM ICD-9-CM    1. Medicare annual wellness visit, subsequent  Z00.00 V70.0    2. Acquired hypothyroidism  E03.9 244.9    3. Gastroesophageal reflux disease, unspecified whether esophagitis present  K21.9 530.81    4. Esophageal dysphagia  R13.10 787.20    5. Hyperlipidemia, unspecified hyperlipidemia type  E78.5 272.4 rosuvastatin (CRESTOR) 5 mg tablet   6. Hydrocephalus with operating shunt (HCC)  G91.9 331.4    7.  Vitamin D deficiency  E55.9 268.9 cholecalciferol, vitamin D3, 10 mcg/5 mL (400 unit/5 mL) liqd      DISCONTINUED: multivitamin (MULTI-DELYN, WELLESSE) liqd   8. Allergic rhinitis, unspecified seasonality, unspecified trigger  J30.9 477.9    9. Encounter for immunization  Z23 V03.89 PNEUMOCOCCAL POLYSACCHARIDE VACCINE, 23-VALENT, ADULT OR IMMUNOSUPPRESSED PT DOSE,     Follow-up and Dispositions    · Return in about 6 months (around 6/22/2021), or if symptoms worsen or fail to improve, for cholesterol, thyroid.         results and schedule of future studies reviewed with patient  reviewed diet, exercise and weight   cardiovascular risk and specific lipid/LDL goals reviewed  reviewed medications and side effects in detail     Mammogram has been ordered    Health Maintenance Due     Health Maintenance Due   Topic Date Due    Shingrix Vaccine Age 49> (1 of 2) 12/24/2017    Breast Cancer Screen Mammogram  07/14/2019    PAP AKA CERVICAL CYTOLOGY  07/14/2020       Patient Care Team   Patient Care Team:  Yasmany Marsh MD as PCP - General (Internal Medicine)  Yasmany Marsh MD as PCP - [facility] Empaneled Provider  Sandro Matta MD (Obstetrics & Gynecology)  Socorro Naik MD (Neurosurgery)    History     Patient Active Problem List   Diagnosis Code    Dizziness R42     (ventriculoperitoneal) shunt status Z98.2    Unspecified hearing loss H91.90    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    Migraine G43.909    Vitamin D deficiency E55.9    Hypothyroidism E03.9    Mixed obsessional thoughts and acts F42.2    Hyperlipidemia E78.5    Anxiety and depression F41.9, F32.9    Hearing loss H91.90    Allergic rhinitis J30.9    Diverticulosis K57.90    Hydrocephalus with operating shunt ([address] G91.9     Past Medical History:   Diagnosis Date    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 Vane Dallas Unspecified fetal growth retardation, 1,250-1,499 grams     Unspecified hearing loss     Vitamin D deficiency 2011     (ventriculoperitoneal) shunt status       Past Surgical History:   Procedure Laterality Date    COLONOSCOPY N/A 2018    COLONOS[facility] performed [facility] at [facility] ENDOSCOPY    HX COLONOSCOPY      HX GYN  510151    fibroid embolization    HX OTHER SURGICAL  812954     shunt insertion    HX OTHER SURGICAL  885673    shunt replacement    HX OTHER SURGICAL  371264    shunt revision    HX OTHER SURGICAL  618235    shunt replacement     Current Outpatient Medications   Medication Sig Dispense Refill    levothyroxine (SYNTHROID) 50 mcg tablet Take 1 Tab by mouth Daily (before breakfast). 90 Tab 1    omeprazole (PRILOSEC) 20 mg capsule Take 1 Cap by mouth daily. 90 Cap 3    sucralfate (CARAFATE) 100 mg/mL suspension Take 10 mL by mouth Before breakfast, lunch, and dinner. 900 mL 2    rosuvastatin (CRESTOR) 5 mg tablet TAKE 1 TABLET BY MOUTH EVERY NIGHT 30 Tab 5    guaiFENesin ER (MUCINEX) 600 mg ER tablet Take 1 Tab by mouth two (2) times a day. 30 Tab 1    ascorbic acid (VITAMIN C) 250 mg tablet Take  by mouth.  loratadine (CLARITIN) 10 mg tablet Take 1 Tab by mouth daily. 30 Tab 5    cholecalciferol (VITAMIN D3) 1,000 unit tablet Take 2 Tabs by mouth daily. 60 Tab 11    MULTI-VITAMIN PO Take  by mouth.        Allergies   Allergen Reactions    Pcn [Penicillins] Unknown (comments)     Pt does not remember the reaction and she has taken amoxil without any problems     Sulfa (Sulfonamide Antibiotics) Other (comments)     Not sure        Family History   Problem Relation Age of Onset    Heart Attack Father      Social History     Tobacco Use    Smoking status: Never Smoker    Smokeless tobacco: Never Used   Substance Use Topics    Alcohol use: Never     Frequency: Never

## 2020-12-22 NOTE — PROGRESS NOTES
After obtaining consent, and per verbal order from Dr. Evan Beltrán, patient received pneumovax 23 vaccine given by Jeanine Trevizo LPN in left Deltoid. Pneumonia Vaccine 0.5 mL IM now. Patient was observed for 10 minutes post injection. Patient tolerated injection well. VIS given.

## 2020-12-23 ENCOUNTER — TELEPHONE (OUTPATIENT)
Dept: INTERNAL MEDICINE CLINIC | Age: 55
End: 2020-12-23

## 2020-12-23 NOTE — TELEPHONE ENCOUNTER
Pharmacy called again because they want to make sure the pt was supposed to receive a pediatrics liquid form of the multi vitamin and also said there is no liqud form of the vitamin d  So they need a new script for tablet form

## 2020-12-23 NOTE — TELEPHONE ENCOUNTER
Called and spoke with Pharmacy. Order for 10 ml per day dose has been received and filled for MVI for patient in liquid form as requested by patient.

## 2020-12-27 NOTE — TELEPHONE ENCOUNTER
Pt requests liquid forms of these vitamins or she will not take them. Please fill the liquid pediatric multivitamin as well as a liquid vitamin D. There is a pediatric vitamin D in liquid form that could be obtained and filled for pt - as Rx'd.

## 2020-12-30 NOTE — TELEPHONE ENCOUNTER
Spoke with pharmacist in regards to vitamin D and multivitamin. Pharmacy requested order be faxed for the vitamin D. [phone]. Confirmation received. Document placed in bin for centralized scanning.

## 2021-02-25 ENCOUNTER — TELEPHONE (OUTPATIENT)
Dept: INTERNAL MEDICINE CLINIC | Age: 56
End: 2021-02-25

## 2021-02-25 RX ORDER — CHOLECALCIFEROL (VITAMIN D3) 10(400)/ML
5 DROPS ORAL DAILY
Qty: 150 ML | Refills: 5 | Status: CANCELLED | OUTPATIENT
Start: 2021-02-25

## 2021-02-25 NOTE — TELEPHONE ENCOUNTER
Please clarify what the issue is with the pt. Pt had expressed that she would/could only take the vitamin supplements if in liquid form.

## 2021-02-25 NOTE — TELEPHONE ENCOUNTER
Dr. Anjali Newell,          Per [address] the patient no longer wants to be on a liquid multivitamin. Please review and contact patient. BCB: [phone]. Requested Prescriptions     Pending Prescriptions Disp Refills    cholecalciferol, vitamin D3, 10 mcg/mL (400 unit/mL) oral solution 150 mL 5     Sig: Take 5 mL by mouth daily.

## 2021-02-25 NOTE — TELEPHONE ENCOUNTER
Writer spoke with patient who states she will try to find a multivitamin OTC. Patient did not like the taste of liquid vitamin. No further questions, concerns, or request prior to ending call.

## 2021-06-22 ENCOUNTER — OFFICE VISIT (OUTPATIENT)
Dept: INTERNAL MEDICINE CLINIC | Age: 56
End: 2021-06-22
Payer: MEDICARE

## 2021-06-22 VITALS
TEMPERATURE: 97.7 F | HEART RATE: 80 BPM | RESPIRATION RATE: 16 BRPM | SYSTOLIC BLOOD PRESSURE: 127 MMHG | HEIGHT: 62 IN | DIASTOLIC BLOOD PRESSURE: 79 MMHG | OXYGEN SATURATION: 97 % | WEIGHT: 159 LBS | BODY MASS INDEX: 29.26 KG/M2

## 2021-06-22 DIAGNOSIS — E03.9 ACQUIRED HYPOTHYROIDISM: Primary | ICD-10-CM

## 2021-06-22 DIAGNOSIS — Z11.59 NEED FOR HEPATITIS C SCREENING TEST: ICD-10-CM

## 2021-06-22 DIAGNOSIS — Z98.2 VP (VENTRICULOPERITONEAL) SHUNT STATUS: ICD-10-CM

## 2021-06-22 DIAGNOSIS — E78.5 HYPERLIPIDEMIA, UNSPECIFIED HYPERLIPIDEMIA TYPE: ICD-10-CM

## 2021-06-22 DIAGNOSIS — H91.8X3 OTHER SPECIFIED HEARING LOSS OF BOTH EARS: ICD-10-CM

## 2021-06-22 DIAGNOSIS — E55.9 VITAMIN D DEFICIENCY: ICD-10-CM

## 2021-06-22 DIAGNOSIS — K21.9 GASTROESOPHAGEAL REFLUX DISEASE, UNSPECIFIED WHETHER ESOPHAGITIS PRESENT: ICD-10-CM

## 2021-06-22 LAB
25(OH)D3 SERPL-MCNC: 22.6 NG/ML (ref 30–100)
ALBUMIN SERPL-MCNC: 4.3 G/DL (ref 3.5–5)
ALBUMIN/GLOB SERPL: 1.2 {RATIO} (ref 1.1–2.2)
ALP SERPL-CCNC: 106 U/L (ref 45–117)
ALT SERPL-CCNC: 22 U/L (ref 12–78)
ANION GAP SERPL CALC-SCNC: 4 MMOL/L (ref 5–15)
AST SERPL-CCNC: 5 U/L (ref 15–37)
BASOPHILS # BLD: 0 K/UL (ref 0–0.1)
BASOPHILS NFR BLD: 1 % (ref 0–1)
BILIRUB SERPL-MCNC: 0.5 MG/DL (ref 0.2–1)
BUN SERPL-MCNC: 10 MG/DL (ref 6–20)
BUN/CREAT SERPL: 13 (ref 12–20)
CALCIUM SERPL-MCNC: 9.7 MG/DL (ref 8.5–10.1)
CHLORIDE SERPL-SCNC: 109 MMOL/L (ref 97–108)
CHOLEST SERPL-MCNC: 162 MG/DL
CK SERPL-CCNC: 81 U/L (ref 26–192)
CO2 SERPL-SCNC: 27 MMOL/L (ref 21–32)
CREAT SERPL-MCNC: 0.75 MG/DL (ref 0.55–1.02)
DIFFERENTIAL METHOD BLD: NORMAL
EOSINOPHIL # BLD: 0.1 K/UL (ref 0–0.4)
EOSINOPHIL NFR BLD: 2 % (ref 0–7)
ERYTHROCYTE [DISTWIDTH] IN BLOOD BY AUTOMATED COUNT: 11.7 % (ref 11.5–14.5)
GLOBULIN SER CALC-MCNC: 3.6 G/DL (ref 2–4)
GLUCOSE SERPL-MCNC: 93 MG/DL (ref 65–100)
HCT VFR BLD AUTO: 40.4 % (ref 35–47)
HDLC SERPL-MCNC: 68 MG/DL
HDLC SERPL: 2.4 {RATIO} (ref 0–5)
HGB BLD-MCNC: 13 G/DL (ref 11.5–16)
IMM GRANULOCYTES # BLD AUTO: 0 K/UL (ref 0–0.04)
IMM GRANULOCYTES NFR BLD AUTO: 0 % (ref 0–0.5)
LDLC SERPL CALC-MCNC: 80.4 MG/DL (ref 0–100)
LYMPHOCYTES # BLD: 1.4 K/UL (ref 0.8–3.5)
LYMPHOCYTES NFR BLD: 23 % (ref 12–49)
MCH RBC QN AUTO: 30.8 PG (ref 26–34)
MCHC RBC AUTO-ENTMCNC: 32.2 G/DL (ref 30–36.5)
MCV RBC AUTO: 95.7 FL (ref 80–99)
MONOCYTES # BLD: 0.3 K/UL (ref 0–1)
MONOCYTES NFR BLD: 6 % (ref 5–13)
NEUTS SEG # BLD: 4.2 K/UL (ref 1.8–8)
NEUTS SEG NFR BLD: 68 % (ref 32–75)
NRBC # BLD: 0 K/UL (ref 0–0.01)
NRBC BLD-RTO: 0 PER 100 WBC
PLATELET # BLD AUTO: 310 K/UL (ref 150–400)
PMV BLD AUTO: 10.1 FL (ref 8.9–12.9)
POTASSIUM SERPL-SCNC: 4.2 MMOL/L (ref 3.5–5.1)
PROT SERPL-MCNC: 7.9 G/DL (ref 6.4–8.2)
RBC # BLD AUTO: 4.22 M/UL (ref 3.8–5.2)
SODIUM SERPL-SCNC: 140 MMOL/L (ref 136–145)
T4 FREE SERPL-MCNC: 1.2 NG/DL (ref 0.8–1.5)
TRIGL SERPL-MCNC: 68 MG/DL (ref ?–150)
TSH SERPL DL<=0.05 MIU/L-ACNC: 3.32 UIU/ML (ref 0.36–3.74)
VLDLC SERPL CALC-MCNC: 13.6 MG/DL
WBC # BLD AUTO: 6 K/UL (ref 3.6–11)

## 2021-06-22 PROCEDURE — 3017F COLORECTAL CA SCREEN DOC REV: CPT | Performed by: INTERNAL MEDICINE

## 2021-06-22 PROCEDURE — G0463 HOSPITAL OUTPT CLINIC VISIT: HCPCS | Performed by: INTERNAL MEDICINE

## 2021-06-22 PROCEDURE — G9717 DOC PT DX DEP/BP F/U NT REQ: HCPCS | Performed by: INTERNAL MEDICINE

## 2021-06-22 PROCEDURE — G8417 CALC BMI ABV UP PARAM F/U: HCPCS | Performed by: INTERNAL MEDICINE

## 2021-06-22 PROCEDURE — 99214 OFFICE O/P EST MOD 30 MIN: CPT | Performed by: INTERNAL MEDICINE

## 2021-06-22 PROCEDURE — G9899 SCRN MAM PERF RSLTS DOC: HCPCS | Performed by: INTERNAL MEDICINE

## 2021-06-22 PROCEDURE — G8427 DOCREV CUR MEDS BY ELIG CLIN: HCPCS | Performed by: INTERNAL MEDICINE

## 2021-06-22 NOTE — PROGRESS NOTES
HPI:  established patient  Presents for f/u thyroid, cholesterol    Pt upset about things in life that are not ideal  Frustrated at life's challenges    Mother had a fall and now in assisted living    Pt declines mood modification meds  No SI, no HI. Pt has been off MVI recently    Not taking thyroid regularly - every couple of days    But, does take PPI and statin. abd complaint c/w GERD  Pt concerned re: need for EGD    Past medical, Social, and Family history reviewed    Prior to Admission medications    Medication Sig Start Date End Date Taking? Authorizing Provider   cholecalciferol, vitamin D3, 10 mcg/mL (400 unit/mL) oral solution Take 5 mL by mouth daily. 1/13/21  Yes Jennifer Martin MD   rosuvastatin (CRESTOR) 5 mg tablet TAKE 1 TABLET BY MOUTH EVERY NIGHT 12/22/20  Yes Jennifer Martin MD   multivitamin (MULTI-DELYN, WELLESSE) liqd Take 10 mL by mouth daily. 12/22/20  Yes Jennifer Martin MD   levothyroxine (SYNTHROID) 50 mcg tablet Take 1 Tab by mouth Daily (before breakfast). 11/30/20  Yes Jennifer Martin MD   omeprazole (PRILOSEC) 20 mg capsule Take 1 Cap by mouth daily. 11/18/20  Yes Jennifer Martin MD   loratadine (CLARITIN) 10 mg tablet Take 1 Tab by mouth daily. 12/14/18  Yes Jennifer Martin MD   guaiFENesin ER [facility]) 600 mg ER tablet Take 1 Tab by mouth two (2) times a day. 12/14/18  Yes Jennifer Martin MD   ascorbic acid (VITAMIN C) 250 mg tablet Take  by mouth. Yes Provider, Historical          ROS  Complete ROS reviewed and negative or stable except as noted in HPI. Physical Exam  Vitals and nursing note reviewed. Constitutional:       General: She is not in acute distress. Comments: Dysarthria - baseline   HENT:      Right Ear: Decreased hearing noted. Left Ear: Decreased hearing noted. Eyes:      General: No scleral icterus. Pupils: Pupils are equal, round, and reactive to light. Neck:      Thyroid: No thyromegaly. Vascular: No JVD. Comments: Right sided shunt palpable and nontender  Cardiovascular:      Rate and Rhythm: Normal rate and regular rhythm. Heart sounds: Normal heart sounds. No murmur heard. No friction rub. No gallop. Pulmonary:      Effort: Pulmonary effort is normal. No respiratory distress. Breath sounds: Normal breath sounds. No wheezing or rales. Abdominal:      General: Bowel sounds are normal. There is no distension. Palpations: Abdomen is soft. There is no mass. Tenderness: There is no abdominal tenderness. There is no guarding. Musculoskeletal:         General: Normal range of motion. Cervical back: Normal range of motion and neck supple. Lymphadenopathy:      Cervical: No cervical adenopathy. Skin:     General: Skin is warm. Findings: No rash. Neurological:      Mental Status: She is alert and oriented to person, place, and time. Motor: No abnormal muscle tone. Prior labs reviewed. Assessment/Plan:    ICD-10-CM ICD-9-CM    1. Acquired hypothyroidism  E03.9 244.9 TSH 3RD GENERATION      T4, FREE      T4, FREE      TSH 3RD GENERATION   2. Vitamin D deficiency  E55.9 268.9 VITAMIN D, 25 HYDROXY      VITAMIN D, 25 HYDROXY   3. Gastroesophageal reflux disease, unspecified whether esophagitis present  K21.9 530.81 CBC WITH AUTOMATED DIFF      REFERRAL TO GASTROENTEROLOGY      CBC WITH AUTOMATED DIFF   4. Hyperlipidemia, unspecified hyperlipidemia type  E78.5 904.2 CK      METABOLIC PANEL, COMPREHENSIVE      LIPID PANEL      LIPID PANEL      METABOLIC PANEL, COMPREHENSIVE      CK   5.  (ventriculoperitoneal) shunt status  Z98.2 V45.2    6. Other specified hearing loss of both ears  H91.8X3 389.8    7. Need for hepatitis C screening test  Z11.59 V73.89 HCV AB W/RFLX TO EDMUND      HCV AB W/RFLX TO EDMUND   8.  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 results and schedule of future studies reviewed with patient  reviewed diet, exercise and weight   cardiovascular risk and specific lipid/LDL goals reviewed  reviewed medications and side effects in detail    Pt declines SSRI  Ref to GI to review endoscopy - pt declines colonoscopy  Pt defers mammogram.  Fasting labs today - adjust tx as indicated  Tdap and shingrix at pharmacy      An electronic signature was used to authenticate this note.   -- Lucas Walker MD

## 2021-06-22 NOTE — PROGRESS NOTES
RM: 15  Patient is fasting  Observed that patient seems upset this morning. Chief Complaint   Patient presents with    Cholesterol Problem     6 month f/u    Thyroid Problem     patients complains of stomach pain      Visit Vitals  /79 (BP 1 Location: Left upper arm, BP Patient Position: Sitting, BP Cuff Size: Adult)   Pulse 80   Temp 97.7 °F (36.5 °C) (Oral)   Resp 16   Ht 5' 2\" (1.575 m)   Wt 159 lb (72.1 kg)   SpO2 97%   BMI 29.08 kg/m²     Recent Travel Screening and Travel History documentation     Travel Screening     Question   Response    In the last month, have you been in contact with someone who was confirmed or suspected to have Coronavirus / COVID-19? No / Unsure    Have you had a COVID-19 viral test in the last 14 days? No    Do you have any of the following new or worsening symptoms? None of these    Have you traveled internationally or domestically in the last month? No      Travel History   Travel since 05/22/21     No documented travel since 05/22/21                   3 most recent PHQ Screens 12/22/2020   PHQ Not Done -   Little interest or pleasure in doing things Several days   Feeling down, depressed, irritable, or hopeless Several days   Total Score PHQ 2 2            1. Have you been to the ER, urgent care clinic since your last visit? Hospitalized since your last visit? No    2. Have you seen or consulted any other health care providers outside of the [address] [place] [address] since your last visit? Include any pap smears or colon screening.  No     Health Maintenance Due   Topic Date Due    Hepatitis C Screening  Never done    Shingrix Vaccine Age 50> (1 of 2) Never done    Breast Cancer Screen Mammogram  07/14/2019    PAP AKA CERVICAL CYTOLOGY  07/14/2020    DTaP/Tdap/Td series (2 - Td or Tdap) 05/11/2021        Learning Assessment 5/9/2018   PRIMARY LEARNER Patient   HIGHEST LEVEL OF EDUCATION - PRIMARY LEARNER  SOME COLLEGE   BARRIERS PRIMARY LEARNER NONE   CO-LEARNER CAREGIVER No   PRIMARY LANGUAGE ENGLISH   LEARNER PREFERENCE PRIMARY READING   ANSWERED BY patient   RELATIONSHIP SELF

## 2021-06-23 DIAGNOSIS — E55.9 VITAMIN D DEFICIENCY: Primary | ICD-10-CM

## 2021-06-23 LAB
HCV AB S/CO SERPL IA: <0.1 S/CO RATIO (ref 0–0.9)
HCV AB SERPL QL IA: NORMAL

## 2021-06-23 RX ORDER — CHOLECALCIFEROL (VITAMIN D3) 10(400)/ML
7.5 DROPS ORAL DAILY
Qty: 250 ML | Refills: 5 | Status: SHIPPED | OUTPATIENT
Start: 2021-06-23 | End: 2022-06-20

## 2021-06-23 NOTE — PROGRESS NOTES
Please notify pt of results    Vitamin D is a little low - increase vitamin D intake by 1000 units per day.   Other labs are either normal or stable and at goal.  Continue other current medications

## 2021-06-25 NOTE — PROGRESS NOTES
Realized later that patient had already been informed of this info yesterday by Noman Ibrahim in a call note.  Left voice mail for patient apologizing for multiple calls to tell her the same info advised if she still had any questions to let us know

## 2021-07-21 DIAGNOSIS — E78.5 HYPERLIPIDEMIA, UNSPECIFIED HYPERLIPIDEMIA TYPE: ICD-10-CM

## 2021-07-21 RX ORDER — ROSUVASTATIN CALCIUM 5 MG/1
TABLET, COATED ORAL
Qty: 30 TABLET | Refills: 5 | Status: SHIPPED | OUTPATIENT
Start: 2021-07-21 | End: 2022-02-08

## 2021-09-09 NOTE — TELEPHONE ENCOUNTER
10 ml per day dose re-sent.     Pt does not need a MVI with iron, just a MVI in liquid form per her request. Notification of Discharge   This is a Notification of Discharge from our facility [address]  Please be advised that this patient has been discharge from our facility  Below you will find the admission and discharge date and time including the patients disposition  UTILIZATION REVIEW CONTACT:  Cynthia Penn  Utilization   Network Utilization Review Department  Phone: [phone] x carefully listen to the prompts  All voicemails are confidential   Email: [email or web address]     PHYSICIAN ADVISORY SERVICES:  FOR [facility] REVIEW - MEDICAL NECESSITY DENIAL  Phone: [phone]  Fax: [phone]  Email: [email or web address]     PRESENTATION DATE: 9/6/2021 10:36 PM  OBERVATION ADMISSION DATE:   INPATIENT ADMISSION DATE: 9/7/21  5:13 PM   DISCHARGE DATE: 9/8/2021  5:40 PM  DISPOSITION: Home/Self Care Home/Self Care      IMPORTANT INFORMATION:  Send all requests for admission clinical reviews, approved or denied determinations and any other requests to dedicated fax number below belonging to the campus where the patient is receiving treatment   List of dedicated fax numbers:  [address] [place] [address] DENIALS (Administrative/Medical Necessity) [phone]   [address] [place] [address] (Maternity/NICU/Pediatrics) [phone]   [facility]ton [phone]   [facility] [phone]   Elaina Vega [phone]   Titi Lyons [facility] [address] [place] [address] [phone]   [facility]  [phone]   [address] [facility] [address] [facility]  [facility] [address] [facility] [phone]

## 2021-12-03 RX ORDER — OMEPRAZOLE 20 MG/1
CAPSULE, DELAYED RELEASE ORAL
Qty: 90 CAPSULE | Refills: 3 | Status: SHIPPED | OUTPATIENT
Start: 2021-12-03

## 2022-02-01 ENCOUNTER — OFFICE VISIT (OUTPATIENT)
Dept: INTERNAL MEDICINE CLINIC | Age: 57
End: 2022-02-01
Payer: MEDICARE

## 2022-02-01 VITALS
SYSTOLIC BLOOD PRESSURE: 133 MMHG | WEIGHT: 159 LBS | DIASTOLIC BLOOD PRESSURE: 88 MMHG | HEART RATE: 102 BPM | OXYGEN SATURATION: 96 % | BODY MASS INDEX: 29.26 KG/M2 | RESPIRATION RATE: 20 BRPM | HEIGHT: 62 IN

## 2022-02-01 DIAGNOSIS — K21.9 GASTROESOPHAGEAL REFLUX DISEASE, UNSPECIFIED WHETHER ESOPHAGITIS PRESENT: ICD-10-CM

## 2022-02-01 DIAGNOSIS — Z00.00 MEDICARE ANNUAL WELLNESS VISIT, SUBSEQUENT: Primary | ICD-10-CM

## 2022-02-01 DIAGNOSIS — J30.9 ALLERGIC RHINITIS, UNSPECIFIED SEASONALITY, UNSPECIFIED TRIGGER: ICD-10-CM

## 2022-02-01 DIAGNOSIS — G91.9 HYDROCEPHALUS WITH OPERATING SHUNT (HCC): ICD-10-CM

## 2022-02-01 DIAGNOSIS — E78.5 HYPERLIPIDEMIA, UNSPECIFIED HYPERLIPIDEMIA TYPE: ICD-10-CM

## 2022-02-01 DIAGNOSIS — E03.9 ACQUIRED HYPOTHYROIDISM: ICD-10-CM

## 2022-02-01 DIAGNOSIS — Z01.419 WELL FEMALE EXAM WITH ROUTINE GYNECOLOGICAL EXAM: ICD-10-CM

## 2022-02-01 DIAGNOSIS — R73.9 HYPERGLYCEMIA: ICD-10-CM

## 2022-02-01 DIAGNOSIS — E55.9 VITAMIN D DEFICIENCY: ICD-10-CM

## 2022-02-01 PROCEDURE — G0463 HOSPITAL OUTPT CLINIC VISIT: HCPCS | Performed by: INTERNAL MEDICINE

## 2022-02-01 PROCEDURE — 99214 OFFICE O/P EST MOD 30 MIN: CPT | Performed by: INTERNAL MEDICINE

## 2022-02-01 PROCEDURE — G9899 SCRN MAM PERF RSLTS DOC: HCPCS | Performed by: INTERNAL MEDICINE

## 2022-02-01 PROCEDURE — 3017F COLORECTAL CA SCREEN DOC REV: CPT | Performed by: INTERNAL MEDICINE

## 2022-02-01 PROCEDURE — G0439 PPPS, SUBSEQ VISIT: HCPCS | Performed by: INTERNAL MEDICINE

## 2022-02-01 PROCEDURE — G8427 DOCREV CUR MEDS BY ELIG CLIN: HCPCS | Performed by: INTERNAL MEDICINE

## 2022-02-01 PROCEDURE — G9717 DOC PT DX DEP/BP F/U NT REQ: HCPCS | Performed by: INTERNAL MEDICINE

## 2022-02-01 PROCEDURE — G8419 CALC BMI OUT NRM PARAM NOF/U: HCPCS | Performed by: INTERNAL MEDICINE

## 2022-02-01 NOTE — PATIENT INSTRUCTIONS
Medicare Wellness Visit, Female     The best way to live healthy is to have a lifestyle where you eat a well-balanced diet, exercise regularly, limit alcohol use, and quit all forms of tobacco/nicotine, if applicable. Regular preventive services are another way to keep healthy. Preventive services (vaccines, screening tests, monitoring & exams) can help personalize your care plan, which helps you manage your own care. Screening tests can find health problems at the earliest stages, when they are easiest to treat. [facility] follows the current, evidence-based guidelines published by the [facility] ([facility]) when recommending preventive services for our patients. Because we follow these guidelines, sometimes recommendations change over time as research supports it. (For example, mammograms used to be recommended annually. Even though Medicare will still pay for an annual mammogram, the newer guidelines recommend a mammogram every two years for women of average risk). Of course, you and your doctor may decide to screen more often for some diseases, based on your risk and your co-morbidities (chronic disease you are already diagnosed with). Preventive services for you include:  - Medicare offers their members a free annual wellness visit, which is time for you and your primary care provider to discuss and plan for your preventive service needs. Take advantage of this benefit every year!  -All adults over the age of 72 should receive the recommended pneumonia vaccines. Current USPSTF guidelines recommend a series of two vaccines for the best pneumonia protection.   -All adults should have a flu vaccine yearly and a tetanus vaccine every 10 years.   -All adults age 48 and older should receive the shingles vaccines (series of two vaccines).       -All adults age 38-68 who are overweight should have a diabetes screening test once every three years.   -All adults born between [birth year] and 1963 should be screened once for Hepatitis C.  -Other screening tests and preventive services for persons with diabetes include: an eye exam to screen for diabetic retinopathy, a kidney function test, a foot exam, and stricter control over your cholesterol.   -Cardiovascular screening for adults with routine risk involves an electrocardiogram (ECG) at intervals determined by your doctor.   -Colorectal cancer screenings should be done for adults age 54-65 with no increased risk factors for colorectal cancer. There are a number of acceptable methods of screening for this type of cancer. Each test has its own benefits and drawbacks. Discuss with your doctor what is most appropriate for you during your annual wellness visit. The different tests include: colonoscopy (considered the best screening method), a fecal occult blood test, a fecal DNA test, and sigmoidoscopy.    -A bone mass density test is recommended when a woman turns 65 to screen for osteoporosis. This test is only recommended one time, as a screening. Some providers will use this same test as a disease monitoring tool if you already have osteoporosis. -Breast cancer screenings are recommended every other year for women of normal risk, age 54-69.  -Cervical cancer screenings for women over age 72 are only recommended with certain risk factors.      Here is a list of your current Health Maintenance items (your personalized list of preventive services) with a due date:  Health Maintenance Due   Topic Date Due    Cervical cancer screen  Never done    Shingles Vaccine (1 of 2) Never done    Mammogram  07/14/2019    DTaP/Tdap/Td  (2 - Td or Tdap) 05/11/2021    Yearly Flu Vaccine (1) 09/01/2021    Depression Monitoring  12/22/2021       Ask pharmacy about shingles and Tdap vaccines

## 2022-02-01 NOTE — PROGRESS NOTES
rm 14    Chief Complaint   Patient presents with    Follow-up     GERD, meds     1. Have you been to the ER, urgent care clinic since your last visit? Hospitalized since your last visit? No    2. Have you seen or consulted any other health care providers outside of the [address] [place] [address] since your last visit? Include any pap smears or colon screening.  No     Visit Vitals  /88   Pulse (!) 102   Resp 20   Ht 5' 2\" (1.575 m)   Wt 159 lb (72.1 kg)   SpO2 96%   BMI 29.08 kg/m²

## 2022-02-01 NOTE — PROGRESS NOTES
This is the Subsequent Medicare Annual Wellness Exam, performed 12 months or more after the Initial AWV or the last Subsequent AWV    I have reviewed the patient's medical history in detail and updated the computerized patient record. Assessment/Plan   Education and counseling provided:  Are appropriate based on today's review and evaluation       ICD-10-CM ICD-9-CM    1. Medicare annual wellness visit, subsequent  Z00.00 V70.0    2. Hyperlipidemia, unspecified hyperlipidemia type  E78.5 000.4 CK      METABOLIC PANEL, COMPREHENSIVE      LIPID PANEL      LIPID PANEL      METABOLIC PANEL, COMPREHENSIVE      CK   3. Vitamin D deficiency  E55.9 268.9 VITAMIN D, 25 HYDROXY      VITAMIN D, 25 HYDROXY   4. Acquired hypothyroidism  E03.9 244.9 TSH 3RD GENERATION      T4, FREE      T4, FREE      TSH 3RD GENERATION   5. Gastroesophageal reflux disease, unspecified whether esophagitis present  K21.9 530.81 CBC WITH AUTOMATED DIFF      CBC WITH AUTOMATED DIFF   6. Allergic rhinitis, unspecified seasonality, unspecified trigger  J30.9 477.9    7. Hyperglycemia  R73.9 790.29 HEMOGLOBIN A1C WITH EAG      HEMOGLOBIN A1C WITH EAG   8. Hydrocephalus with operating shunt (HCC)  G91.9 331.4    9. Well female exam with routine gynecological exam  Z01.419 V72.31 REFERRAL TO GYNECOLOGY     Follow-up and Dispositions    · Return in about 6 months (around 8/1/2022), or if symptoms worsen or fail to improve, for thyroid, cholesterol.         results and schedule of future studies reviewed with patient  reviewed diet, exercise and weight   cardiovascular risk and specific lipid/LDL goals reviewed  reviewed medications and side effects in detail     Declines therapy or SSRI  Declines mammogram    Depression Risk Factor Screening     3 most recent PHQ Screens 2/1/2022   PHQ Not Done -   Little interest or pleasure in doing things More than half the days   Feeling down, depressed, irritable, or hopeless More than half the days   Total Score PHQ 2 4     Pt declines help  No SI    Alcohol & Drug Abuse Risk Screen    Do you average more than 1 drink per night or more than 7 drinks a week:  No    On any one occasion in the past three months have you have had more than 3 drinks containing alcohol:  No          Functional Ability and Level of Safety    Hearing: The patient wears hearing aids. Activities of Daily Living: The home contains: no safety equipment. Patient needs help with:  transportation, shopping, managing medications and managing money      Ambulation: with difficulty, uses a walker     Fall Risk:  Fall Risk Assessment, last 12 mths 12/22/2020   Able to walk? Yes   Fall in past 12 months? No      Abuse Screen:  Patient is not abused       Cognitive Screening    Has your family/caregiver stated any concerns about your memory: no     Some baseline cognitive limitations    Health Maintenance Due     Health Maintenance Due   Topic Date Due    Cervical cancer screen  Never done    Shingrix Vaccine Age 49> (1 of 2) Never done    Breast Cancer Screen Mammogram  07/14/2019    DTaP/Tdap/Td series (2 - Td or Tdap) 05/11/2021    Flu Vaccine (1) 09/01/2021    Depression Monitoring  12/22/2021       Patient Care Team   Patient Care Team:  Krystle Bryant MD as PCP - General (Internal Medicine)  Krystle Bryant MD as PCP - [address] [place] [address] [facility]  Dianne Gonzalez MD (Obstetrics & Gynecology)  Brianda Cunha MD (Neurosurgery)    History     Patient Active Problem List   Diagnosis Code    Dizziness R42     (ventriculoperitoneal) shunt status Z98.2    Unspecified hearing loss H91.90    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    Migraine G43.909    Vitamin D deficiency E55.9    Hypothyroidism E03.9    Mixed obsessional thoughts and acts F42.2    Hyperlipidemia E78.5    Anxiety and depression F41.9, F32. A    Hearing loss H91.90    Allergic rhinitis J30.9    Diverticulosis K57.90    Hydrocephalus with operating shunt ([facility] [address] G91.9     Past Medical History:   Diagnosis Date    Cerebral hemorrhage at birth    [place] Diverticulosis     GERD (gastroesophageal reflux disease)     Hearing loss     Hydrocephalus, congenital (HCC)     Hypothyroidism 2011    Impacted cerumen     Leiomyoma of uterus, unspecified     Migraine     Mild intellectual disabilities     Myopia     OCD (obsessive compulsive disorder) 2012    Pneumothorax of      Routine gynecological examination     Dr. Marco Gee Unspecified fetal growth retardation, 1,250-1,499 grams     Unspecified hearing loss     Vitamin D deficiency 2011     (ventriculoperitoneal) shunt status       Past Surgical History:   Procedure Laterality Date    COLONOSCOPY N/A 2018    COLONOSCOPY performed by Lona Clark MD at [address] HX COLONOSCOPY      HX GYN  057992    fibroid embolization    HX OTHER SURGICAL  626114     shunt insertion    HX OTHER SURGICAL  091934    shunt replacement    HX OTHER SURGICAL  322339    shunt revision    HX OTHER SURGICAL  947886    shunt replacement     Current Outpatient Medications   Medication Sig Dispense Refill    omeprazole (PRILOSEC) 20 mg capsule TAKE 1 CAPSULE BY MOUTH DAILY 90 Capsule 3    rosuvastatin (CRESTOR) 5 mg tablet TAKE 1 TABLET BY MOUTH EVERY NIGHT 30 Tablet 5    multivitamin (MULTI-DELYN, WELLESSE) liqd Take 10 mL by mouth daily. 300 mL 5    levothyroxine (SYNTHROID) 50 mcg tablet Take 1 Tab by mouth Daily (before breakfast). (Patient taking differently: Take 50 mcg by mouth as needed.) 90 Tab 1    loratadine (CLARITIN) 10 mg tablet Take 1 Tab by mouth daily. 30 Tab 5    cholecalciferol, vitamin D3, 10 mcg/mL (400 unit/mL) oral solution Take 7.5 mL by mouth daily. (Patient not taking: Reported on 2022) 250 mL 5    guaiFENesin ER (MUCINEX) 600 mg ER tablet Take 1 Tab by mouth two (2) times a day.  (Patient not taking: Reported on 2022) 30 Tab 1    ascorbic acid (VITAMIN C) 250 mg tablet Take  by mouth.  (Patient not taking: Reported on 2/1/2022)       Allergies   Allergen Reactions    Pcn [Penicillins] Unknown (comments)     Pt does not remember the reaction and she has taken amoxil without any problems     Sulfa (Sulfonamide Antibiotics) Other (comments)     Not sure        Family History   Problem Relation Age of Onset    Heart Attack Father      Social History     Tobacco Use    Smoking status: Never Smoker    Smokeless tobacco: Never Used   Substance Use Topics    Alcohol use: Never         Douglas Meek MD

## 2022-02-01 NOTE — PROGRESS NOTES
HPI:  established patient  Presents for f/u thyroid, GERD, etc    Depression screening yields + results  No SI  Pt is worried re: mother's health - in nursing home with declining health. Pt states she doesn't want to feel better  Declines therapy and meds. Pt reports variable compliance with thyroid dosing due to requirement to defer food for 1 hour    Pt takes statin and MVI. Pt states she is tired of so many \"challenges\"  Pt states she wants to be angry. Past medical, Social, and Family history reviewed    Prior to Admission medications    Medication Sig Start Date End Date Taking? Authorizing Provider   omeprazole (PRILOSEC) 20 mg capsule TAKE 1 CAPSULE BY MOUTH DAILY 12/3/21  Yes Bhumika Brooks MD   rosuvastatin (CRESTOR) 5 mg tablet TAKE 1 TABLET BY MOUTH EVERY NIGHT 7/21/21  Yes Bhumika Brooks MD   multivitamin (MULTI-DELYN, WELLESSE) liqd Take 10 mL by mouth daily. 12/22/20  Yes Bhumika Brooks MD   levothyroxine (SYNTHROID) 50 mcg tablet Take 1 Tab by mouth Daily (before breakfast). Patient taking differently: Take 50 mcg by mouth as needed. 11/30/20  Yes Bhumika Brooks MD   loratadine (CLARITIN) 10 mg tablet Take 1 Tab by mouth daily. 12/14/18  Yes Bhumika Brooks MD   cholecalciferol, vitamin D3, 10 mcg/mL (400 unit/mL) oral solution Take 7.5 mL by mouth daily. Patient not taking: Reported on 2/1/2022 6/23/21   Bhumika Brooks MD   guaiFENesin ER [facility]) 600 mg ER tablet Take 1 Tab by mouth two (2) times a day. Patient not taking: Reported on 2/1/2022 12/14/18   Bhumika Brooks MD   ascorbic acid (VITAMIN C) 250 mg tablet Take  by mouth. Patient not taking: Reported on 2/1/2022    Provider, Historical          ROS  Complete ROS reviewed and negative or stable except as noted in HPI. Physical Exam  Vitals and nursing note reviewed. Constitutional:       General: She is not in acute distress.      Comments: Dysarthria - baseline   HENT:      Right Ear: Decreased hearing noted. Left Ear: Decreased hearing noted. Eyes:      General: No scleral icterus. Pupils: Pupils are equal, round, and reactive to light. Neck:      Thyroid: No thyromegaly. Vascular: No JVD. Comments: Right sided shunt palpable and nontender  Cardiovascular:      Rate and Rhythm: Normal rate and regular rhythm. Heart sounds: Normal heart sounds. No murmur heard. No friction rub. No gallop. Pulmonary:      Effort: Pulmonary effort is normal. No respiratory distress. Breath sounds: Normal breath sounds. No wheezing or rales. Abdominal:      General: Bowel sounds are normal. There is no distension. Palpations: Abdomen is soft. There is no mass. Tenderness: There is no abdominal tenderness. There is no guarding. Musculoskeletal:         General: Normal range of motion. Cervical back: Normal range of motion and neck supple. Lymphadenopathy:      Cervical: No cervical adenopathy. Skin:     General: Skin is warm. Findings: No rash. Neurological:      Mental Status: She is alert and oriented to person, place, and time. Motor: No abnormal muscle tone. Prior labs reviewed. Assessment/Plan:    ICD-10-CM ICD-9-CM    1. Acquired hypothyroidism  E03.9 244.9 TSH 3RD GENERATION      T4, FREE      T4, FREE      TSH 3RD GENERATION   2. Medicare annual wellness visit, subsequent  Z00.00 V70.0    3. Hyperlipidemia, unspecified hyperlipidemia type  E78.5 638.8 CK      METABOLIC PANEL, COMPREHENSIVE      LIPID PANEL      LIPID PANEL      METABOLIC PANEL, COMPREHENSIVE      CK   4. Vitamin D deficiency  E55.9 268.9 VITAMIN D, 25 HYDROXY      VITAMIN D, 25 HYDROXY   5. Gastroesophageal reflux disease, unspecified whether esophagitis present  K21.9 530.81 CBC WITH AUTOMATED DIFF      CBC WITH AUTOMATED DIFF   6. Allergic rhinitis, unspecified seasonality, unspecified trigger  J30.9 477.9    7.  Hyperglycemia  R73.9 790.29 HEMOGLOBIN A1C WITH EAG HEMOGLOBIN A1C WITH EAG   8. Hydrocephalus with operating shunt (HCC)  G91.9 331.4    9. Well female exam with routine gynecological exam  Z01.419 V72.31 REFERRAL TO GYNECOLOGY     Follow-up and Dispositions    · Return in about 6 months (around 8/1/2022), or if symptoms worsen or fail to improve, for thyroid, cholesterol. results and schedule of future studies reviewed with patient  reviewed diet, exercise and weight  cardiovascular risk and specific lipid/LDL goals reviewed  reviewed medications and side effects in detail    rec's re: meds reviewed  Pt non-compliant and unwilling to compromise   Needs SSRI and therapy  Tdap and shingles at pharmacy - though cost may be too high on shingles vaccine. Labs  Ref GYN - Haylee  Pt declines mammogram      An electronic signature was used to authenticate this note.   -- Susan Sifuentes MD

## 2022-02-02 LAB
25(OH)D3 SERPL-MCNC: 26.7 NG/ML (ref 30–100)
ALBUMIN SERPL-MCNC: 4.3 G/DL (ref 3.5–5)
ALBUMIN/GLOB SERPL: 1.2 {RATIO} (ref 1.1–2.2)
ALP SERPL-CCNC: 110 U/L (ref 45–117)
ALT SERPL-CCNC: 22 U/L (ref 12–78)
ANION GAP SERPL CALC-SCNC: 5 MMOL/L (ref 5–15)
AST SERPL-CCNC: 6 U/L (ref 15–37)
BASOPHILS # BLD: 0 K/UL (ref 0–0.1)
BASOPHILS NFR BLD: 1 % (ref 0–1)
BILIRUB SERPL-MCNC: 0.4 MG/DL (ref 0.2–1)
BUN SERPL-MCNC: 8 MG/DL (ref 6–20)
BUN/CREAT SERPL: 11 (ref 12–20)
CALCIUM SERPL-MCNC: 9.9 MG/DL (ref 8.5–10.1)
CHLORIDE SERPL-SCNC: 109 MMOL/L (ref 97–108)
CHOLEST SERPL-MCNC: 161 MG/DL
CK SERPL-CCNC: 70 U/L (ref 26–192)
CO2 SERPL-SCNC: 26 MMOL/L (ref 21–32)
CREAT SERPL-MCNC: 0.75 MG/DL (ref 0.55–1.02)
DIFFERENTIAL METHOD BLD: NORMAL
EOSINOPHIL # BLD: 0 K/UL (ref 0–0.4)
EOSINOPHIL NFR BLD: 1 % (ref 0–7)
ERYTHROCYTE [DISTWIDTH] IN BLOOD BY AUTOMATED COUNT: 12.1 % (ref 11.5–14.5)
EST. AVERAGE GLUCOSE BLD GHB EST-MCNC: 97 MG/DL
GLOBULIN SER CALC-MCNC: 3.7 G/DL (ref 2–4)
GLUCOSE SERPL-MCNC: 92 MG/DL (ref 65–100)
HBA1C MFR BLD: 5 % (ref 4–5.6)
HCT VFR BLD AUTO: 42.7 % (ref 35–47)
HDLC SERPL-MCNC: 67 MG/DL
HDLC SERPL: 2.4 {RATIO} (ref 0–5)
HGB BLD-MCNC: 13.6 G/DL (ref 11.5–16)
IMM GRANULOCYTES # BLD AUTO: 0 K/UL (ref 0–0.04)
IMM GRANULOCYTES NFR BLD AUTO: 0 % (ref 0–0.5)
LDLC SERPL CALC-MCNC: 81.2 MG/DL (ref 0–100)
LYMPHOCYTES # BLD: 1.4 K/UL (ref 0.8–3.5)
LYMPHOCYTES NFR BLD: 22 % (ref 12–49)
MCH RBC QN AUTO: 30.6 PG (ref 26–34)
MCHC RBC AUTO-ENTMCNC: 31.9 G/DL (ref 30–36.5)
MCV RBC AUTO: 96 FL (ref 80–99)
MONOCYTES # BLD: 0.4 K/UL (ref 0–1)
MONOCYTES NFR BLD: 6 % (ref 5–13)
NEUTS SEG # BLD: 4.6 K/UL (ref 1.8–8)
NEUTS SEG NFR BLD: 70 % (ref 32–75)
NRBC # BLD: 0 K/UL (ref 0–0.01)
NRBC BLD-RTO: 0 PER 100 WBC
PLATELET # BLD AUTO: 342 K/UL (ref 150–400)
PMV BLD AUTO: 10.4 FL (ref 8.9–12.9)
POTASSIUM SERPL-SCNC: 4 MMOL/L (ref 3.5–5.1)
PROT SERPL-MCNC: 8 G/DL (ref 6.4–8.2)
RBC # BLD AUTO: 4.45 M/UL (ref 3.8–5.2)
SODIUM SERPL-SCNC: 140 MMOL/L (ref 136–145)
T4 FREE SERPL-MCNC: 1.2 NG/DL (ref 0.8–1.5)
TRIGL SERPL-MCNC: 64 MG/DL (ref ?–150)
TSH SERPL DL<=0.05 MIU/L-ACNC: 2.9 UIU/ML (ref 0.36–3.74)
VLDLC SERPL CALC-MCNC: 12.8 MG/DL
WBC # BLD AUTO: 6.5 K/UL (ref 3.6–11)

## 2022-02-08 DIAGNOSIS — E78.5 HYPERLIPIDEMIA, UNSPECIFIED HYPERLIPIDEMIA TYPE: ICD-10-CM

## 2022-02-08 RX ORDER — ROSUVASTATIN CALCIUM 5 MG/1
TABLET, COATED ORAL
Qty: 30 TABLET | Refills: 5 | Status: SHIPPED | OUTPATIENT
Start: 2022-02-08 | End: 2022-09-13

## 2022-03-04 DIAGNOSIS — E03.9 ACQUIRED HYPOTHYROIDISM: ICD-10-CM

## 2022-03-06 RX ORDER — LEVOTHYROXINE SODIUM 50 UG/1
TABLET ORAL
Qty: 90 TABLET | Refills: 1 | Status: SHIPPED | OUTPATIENT
Start: 2022-03-06 | End: 2022-06-20

## 2022-03-19 PROBLEM — F41.9 ANXIETY AND DEPRESSION: Status: ACTIVE | Noted: 2017-06-06

## 2022-03-19 PROBLEM — G91.9 HYDROCEPHALUS WITH OPERATING SHUNT (HCC): Status: ACTIVE | Noted: 2019-12-11

## 2022-03-19 PROBLEM — F32.A ANXIETY AND DEPRESSION: Status: ACTIVE | Noted: 2017-06-06

## 2022-03-19 PROBLEM — E78.5 HYPERLIPIDEMIA: Status: ACTIVE | Noted: 2017-06-06

## 2022-03-19 PROBLEM — J30.9 ALLERGIC RHINITIS: Status: ACTIVE | Noted: 2017-08-11

## 2022-06-20 ENCOUNTER — OFFICE VISIT (OUTPATIENT)
Dept: NEUROLOGY | Age: 57
End: 2022-06-20
Payer: MEDICARE

## 2022-06-20 VITALS
HEART RATE: 98 BPM | WEIGHT: 162 LBS | BODY MASS INDEX: 29.63 KG/M2 | OXYGEN SATURATION: 72 % | DIASTOLIC BLOOD PRESSURE: 70 MMHG | SYSTOLIC BLOOD PRESSURE: 117 MMHG

## 2022-06-20 DIAGNOSIS — R25.1 PHYSIOLOGICAL TREMOR: Primary | ICD-10-CM

## 2022-06-20 PROBLEM — U07.1 COVID-19: Status: ACTIVE | Noted: 2022-05-31

## 2022-06-20 PROCEDURE — G9717 DOC PT DX DEP/BP F/U NT REQ: HCPCS | Performed by: PSYCHIATRY & NEUROLOGY

## 2022-06-20 PROCEDURE — 99205 OFFICE O/P NEW HI 60 MIN: CPT | Performed by: PSYCHIATRY & NEUROLOGY

## 2022-06-20 PROCEDURE — G8419 CALC BMI OUT NRM PARAM NOF/U: HCPCS | Performed by: PSYCHIATRY & NEUROLOGY

## 2022-06-20 PROCEDURE — G9899 SCRN MAM PERF RSLTS DOC: HCPCS | Performed by: PSYCHIATRY & NEUROLOGY

## 2022-06-20 PROCEDURE — G8427 DOCREV CUR MEDS BY ELIG CLIN: HCPCS | Performed by: PSYCHIATRY & NEUROLOGY

## 2022-06-20 PROCEDURE — 3017F COLORECTAL CA SCREEN DOC REV: CPT | Performed by: PSYCHIATRY & NEUROLOGY

## 2022-06-20 NOTE — PROGRESS NOTES
NEUROLOGY  NEW PATIENT EVALUATION/CONSULTATION       PATIENT NAME: Reena Pruitt    MRN: [id number]    REASON FOR CONSULTATION: Tremor    24      Previous records (physician notes, laboratory reports, and radiology reports) and imaging studies were reviewed and summarized. My recommendations will be communicated back to the patient's physician(s) via electronic medical record and/or by [place] mail. HISTORY OF PRESENT ILLNESS:  Reena Pruitt is a 49 y.o.  female presenting for evaluation of tremors. H/O L parietal SDH with congenital hydrocephalus s/p VPS. Motor:   Tremor-Left hand, worse with activity x 2 months   Walking/Falls-feels \"clumsy\"  Micrographia-denies  Freezing/Bradykinesia-denies  Balance-+imbalance, uses a rollator to assist  Non-motor:   Drooling-no  Dysphagia-none  Hypophonia-denies  Anosmia-denies  Autonomic:  Urinary-+incontinence since   Constipation-+incontinence   Orthostatic hypotension-denies  Sleep-some insomnia  Cognitive/Memory-\"foggy\"  Behavioral/Psychiatric:   Hallucinations-none  Depression-intermittent      The patient denies a history of exposure to neuroleptics, (Reglan, Phenergan, Compazine, no significant exposure to insecticides/ pesticides/ heavy metals/ carbon monoxide.      +Maternal uncle-tremors/?PD      Previous evaluations: TSH previously elevated         PAST MEDICAL HISTORY:  Past Medical History:   Diagnosis Date    Cerebral hemorrhage at birth    Mia Pagan Diverticulosis     GERD (gastroesophageal reflux disease)     Hearing loss     Hydrocephalus, congenital (HCC)     Hypothyroidism 2011    Impacted cerumen     Leiomyoma of uterus, unspecified     Memory disorder     Per PT    Migraine     Mild intellectual disabilities     Myopia     OCD (obsessive compulsive disorder) 2012    Pneumothorax of      Routine gynecological examination     Dr. Rylie Rubin Unspecified fetal growth retardation, 1,250-1,499 grams     Unspecified hearing loss     Vitamin D deficiency 5/25/2011     (ventriculoperitoneal) shunt status        PAST SURGICAL HISTORY:  Past Surgical History:   Procedure Laterality Date    COLONOSCOPY N/A 11/20/2018    COLONOSCOPY performed by Yadi Bowie MD at [address] HX COLONOSCOPY  11/202018    HX GYN  017704    fibroid embolization    HX OTHER SURGICAL  996003     shunt insertion    HX OTHER SURGICAL  982303    shunt replacement    HX OTHER SURGICAL  101172    shunt revision    HX OTHER SURGICAL  347518    shunt replacement       FAMILY HISTORY:   Family History   Problem Relation Age of Onset    Heart Attack Father          SOCIAL HISTORY:  Social History     Socioeconomic History    Marital status: SINGLE   Tobacco Use    Smoking status: Never Smoker    Smokeless tobacco: Never Used   Vaping Use    Vaping Use: Never used   Substance and Sexual Activity    Alcohol use: Never    Drug use: Never    Sexual activity: Not Currently         MEDICATIONS:   Current Outpatient Medications   Medication Sig Dispense Refill    rosuvastatin (CRESTOR) 5 mg tablet TAKE 1 TABLET BY MOUTH EVERY NIGHT 30 Tablet 5    omeprazole (PRILOSEC) 20 mg capsule TAKE 1 CAPSULE BY MOUTH DAILY 90 Capsule 3         ALLERGIES:  Allergies   Allergen Reactions    Pcn [Penicillins] Unknown (comments)     Pt does not remember the reaction and she has taken amoxil without any problems     Sulfa (Sulfonamide Antibiotics) Other (comments)     Not sure          REVIEW OF SYSTEMS:  10 point ROS reviewed with patient. Please see scanned document under media. PHYSICAL EXAM:  Vital Signs:   Visit Vitals  /70   Pulse 98   Wt 162 lb (73.5 kg)   SpO2 (!) 72%   BMI 29.63 kg/m²        General Medical Exam:  General:  Well appearing, comfortable, in no apparent distress. Eyes/ENT: see cranial nerve examination. Neck: No masses appreciated. Full range of motion without tenderness.   Respiratory:  Clear to auscultation, good air entry bilaterally. Cardiac:  Regular rate and rhythm, no murmur. GI:  Soft, non-tender, non-distended abdomen. Bowel sounds normal. No masses, organomegaly. Extremities:  No deformities, edema, or skin discoloration. Skin:  No rashes or lesions. Neurological:  · Mental Status:  Alert and oriented to person, place, and time with fluent speech. · Cranial Nerves:   CNII/III/IV/VI: visual fields full to confrontation, EOMI, PERRL, no ptosis or nystagmus. CN V: Facial sensation intact bilaterally, masseter 5/5   CN VII: Facial muscles symmetric and strong   CN VIII: Hearing severely impaired b/l, intact vestibular function   CN IX/X: Normal palatal movement   CN XI: Full strength shoulder shrug bilaterally   CN XII: Tongue protrusion full and midline without fasciculation or atrophy  · Motor: Normal tone and muscle bulk with no pronator drift. No atrophy or fasciculations present on examination.   Individual muscle group testing:  Shoulder abduction:   Left:5/5   Right : 5/5    Shoulder adduction:   Left:5/5   Right : 5/5    Elbow flexion:      Left:5/5   Right : 5/5  Elbow extension:    Left:5/5   Right : 5/5   Wrist flexion:    Left:5/5   Right : 5/5  Wrist extension:    Left:5/5   Right : 5/5  Arm pronation:   Left:5/5   Right : 5/5  Arm supination:   Left:5/5   Right : 5/5    Finger flexion:    Left:5/5   Right : 5/5    Finger extension:   Left:5/5   Right : 5/5   Finger abduction:  Left:5/5   Right : 5/5   Finger adduction:   Left:5/5   Right : 5/5  Hip flexion:     Left:5/5   Right : 5/5         Hip extension:   Left:5/5   Right : 5/5    Knee flexion:    Left:5/5   Right : 5/5    Knee extension:   Left:5/5   Right : 5/5    Dorsiflexion:     Left:5/5   Right : 5/5  Plantar flexion:    Left:5/5   Right : 5/5    Foot inversion:    Left:5/5   Right : 5/5   Foot eversion:    Left:5/5   Right : 5/5  Toe flexion:      Left:5/5   Right : 5/5          Toe extension:    Left:5/5   Right : 5/5    · MSRs: No crossed adductors or clonus. RIGHT  LEFT   Brachioradialis 3+ 3+   Biceps 2+ 2+   Triceps 2+ 2+   Knee 3+ 3+   Achilles 2+ 2+        Plantar response Downward Downward          · Sensation: Normal and symmetric perception of pinprick, temperature, light touch, proprioception, and vibration; (-) Romberg. · Coordination: No dysmetria. Normal rapid alternating movements; finger-to-nose and heel-to- shin testing are within normal limits. No resting/action/postural tremor, no bradykinesia or rigidity. · Gait: Slight imbalance but able to ambulate independently    PERTINENT DATA:  INTERNAL RECORDS:  The patient's electronic medical record was reviewed. The relevant details include:    Lab Results   Component Value Date/Time    TSH 2.90 02/01/2022 02:18 PM    T4, Free 1.2 02/01/2022 02:18 PM         CT Results (maximum last 3): Results from [place] encounter on 05/17/18    CT HEAD WO CONT    Narrative  EXAM:  CT HEAD WO CONT    INDICATION:   Chronic left subdural partially calcified collection. Hydrocephalus and  shunt. COMPARISON: CT head on 3/27/2007. MRI brain on 5/1/2013. Chadd Keita CONTRAST:  None. TECHNIQUE: Noncontrast head CT. Coronal and sagittal reformats. CT dose  reduction was achieved through the use of a standardized protocol tailored for  this examination and automatic exposure control for dose modulation. FINDINGS: Left frontal-parietal extra-axial partially calcified collection is  unchanged. Severe mass effect on the left parietal and occipital lobes is  unchanged. No midline shift. Thin corpus callosum is unchanged. No  hydrocephalus. I will dilatation of the posterior body of the right lateral  ventricle is unchanged. Right parietal shunt extends through the parietal lobe  and terminates adjacent to the right aspect of the falx cerebri. No acute intracranial hemorrhage. No acute infarct. No new mass effect. No  transtentorial herniation.     The calvarium is intact. The visualized paranasal sinuses and mastoid air cells  are clear. Impression  IMPRESSION:    No acute intracranial abnormality on this noncontrast head CT. No change. No hydrocephalus. Right parietal  shunt terminates adjacent to the falx  cerebri. MRI Results (most recent):  [facility]    MRI BRAIN W WO CONT    Narrative  **Final Report**      ICD Codes / Adm. Diagnosis: 331.4  268.9 / Obstructive hydrocephalus  Examination:  MR BRAIN W AND WO CON  - [id number] - May  1 2013 11:47AM  Accession No:  [id number]  Reason:  HYDROCEPHALUS      REPORT:  INDICATION: HYDROCEPHALUS date dysfunction, imbalance, hydrocephalus,  obstructive 331.4    COMPARISON: MRI 3/6/12    EXAM: Sagittal T1-weighted spin-echo, axial FLAIR and T2-weighted fast  spin-echo, axial diffusion weighted echo planar, axial T1-weighted  spin-echo, axial gradient echo, and post IV contrast-enhanced axial and  coronal T1-weighted spin-echo MR images of the brain are obtained. A total  of 12 cc intravenous Magnevist was administered for the study. FINDINGS: A large left posterior parietal complex collection is again  demonstrated with mass effect upon the left lateral ventricle appearing  unchanged. The collection measures 10.1 cm craniocaudal, 9 cm anterior  posterior and 6.2 cm transverse. There remains no evidence for  ventriculomegaly. Ventricular size and contour appear unchanged. There is  diffuse dural enhancement. This raises the possibility of intracranial  hypotension. No intra-axial mass or enhancement abnormality is shown. The  vascular flow voids at the base the brain are normal in conspicuity. Sella,  optic chiasm, orbits and paranasal sinuses are normal.      IMPRESSION:  1. Stable complex left posterior parietal extra-axial collection. 2. Diffuse dural enhancement. Findings raise possibility of cerebral  hypotension. Signing/Reading Doctor: Divina Leaks.  Elisha Power ([id number])  Approved: DIEGO Grewal ([id number])  May  1 2013 12:59PM          ASSESSMENT:      ICD-10-CM ICD-9-CM    1. Physiological tremor  R25.1 35.1    49year old female with a h/o L parietal SDH, congenital hydrocephalus s/p VPS, hypothyroidism, HPL presenting for evaluation of left hand tremor x 2 months, intermittent and non-debilitating. Examination today appears non-focal apart from baseline gait instability and severe hearing impairment. She was reassured that there is no evidence of an evolving neurodegenerative disorder such as Parkinson's disease or essential tremor. Suspect enhanced physiologic tremor. Recommend close monitoring of thyroid levels to exclude metabolic derangements which may contribute to symptoms. She should avoid stimulants and caffeine if possible. Recommend Neurosurgery follow up due to h/o VPS, although overall low suspicion for recurrent hydrocephalus. She was advised to return for repeat clinical evaluation if any new/worsening symptoms. Follow-up and Dispositions    · Return if symptoms worsen or fail to improve. I have discussed the diagnosis with the patient today and the intended plan as seen in the above orders with both the patient as well as referring provider and/or PCP via electronic correspondence. The patient has received an after-visit summary and questions were answered concerning future plans. The duration of this appointment visit was 60 minutes spent on interview, examination, review of records/labs/imaging, counseling, explanation of diagnosis, planning of further management, documentation and coordination of care. Suzanne Barillas DO  Staff Neurologist  Diplomate, [address] [place] [address] Board of Psychiatry & Neurology       CC Referring provider:    Dena Paris MD

## 2022-07-26 ENCOUNTER — TELEPHONE (OUTPATIENT)
Dept: INTERNAL MEDICINE CLINIC | Age: 57
End: 2022-07-26

## 2022-07-26 NOTE — TELEPHONE ENCOUNTER
----- Message from Donn Haney sent at 7/26/2022 12:15 PM EDT -----  Subject: Message to Provider    QUESTIONS  Information for Provider? PT is wanting a callback about a appointment   8/1/2022, tried to call office with pt on the line and after 21 minutes pt   asked to have us call her back when they come on the line and if the   office didn't to send the message. ---------------------------------------------------------------------------  --------------  Cindy Karimi [facility]  [phone]; OK to leave message on voicemail  ---------------------------------------------------------------------------  --------------  SCRIPT ANSWERS  Relationship to Patient?  Self

## 2022-09-09 DIAGNOSIS — E78.5 HYPERLIPIDEMIA, UNSPECIFIED HYPERLIPIDEMIA TYPE: ICD-10-CM

## 2022-09-13 RX ORDER — ROSUVASTATIN CALCIUM 5 MG/1
TABLET, COATED ORAL
Qty: 30 TABLET | Refills: 5 | Status: SHIPPED | OUTPATIENT
Start: 2022-09-13

## 2022-10-26 ENCOUNTER — OFFICE VISIT (OUTPATIENT)
Dept: INTERNAL MEDICINE CLINIC | Age: 57
End: 2022-10-26
Payer: MEDICARE

## 2022-10-26 VITALS
WEIGHT: 164 LBS | BODY MASS INDEX: 30.18 KG/M2 | TEMPERATURE: 98.4 F | HEIGHT: 62 IN | HEART RATE: 96 BPM | RESPIRATION RATE: 20 BRPM | DIASTOLIC BLOOD PRESSURE: 83 MMHG | OXYGEN SATURATION: 96 % | SYSTOLIC BLOOD PRESSURE: 135 MMHG

## 2022-10-26 DIAGNOSIS — E03.9 ACQUIRED HYPOTHYROIDISM: ICD-10-CM

## 2022-10-26 DIAGNOSIS — R42 DIZZINESS: ICD-10-CM

## 2022-10-26 DIAGNOSIS — K21.9 GASTROESOPHAGEAL REFLUX DISEASE, UNSPECIFIED WHETHER ESOPHAGITIS PRESENT: ICD-10-CM

## 2022-10-26 DIAGNOSIS — R29.898 WRIST WEAKNESS: ICD-10-CM

## 2022-10-26 DIAGNOSIS — U07.1 COVID-19: ICD-10-CM

## 2022-10-26 DIAGNOSIS — E78.5 HYPERLIPIDEMIA, UNSPECIFIED HYPERLIPIDEMIA TYPE: Primary | ICD-10-CM

## 2022-10-26 DIAGNOSIS — Z12.31 ENCOUNTER FOR SCREENING MAMMOGRAM FOR MALIGNANT NEOPLASM OF BREAST: ICD-10-CM

## 2022-10-26 DIAGNOSIS — G91.9 HYDROCEPHALUS WITH OPERATING SHUNT (HCC): ICD-10-CM

## 2022-10-26 PROCEDURE — G0463 HOSPITAL OUTPT CLINIC VISIT: HCPCS | Performed by: STUDENT IN AN ORGANIZED HEALTH CARE EDUCATION/TRAINING PROGRAM

## 2022-10-26 PROCEDURE — 99204 OFFICE O/P NEW MOD 45 MIN: CPT | Performed by: STUDENT IN AN ORGANIZED HEALTH CARE EDUCATION/TRAINING PROGRAM

## 2022-10-26 NOTE — PATIENT INSTRUCTIONS
[facility] - will call you about wrist therapy at your house    Please obtain the following vaccines from your local pharmacy. Please ask your pharmacy to fax our office a copy of the vaccination record. Our fax number is [phone]. - Bivalent COVID booster    Please call our  @ [phone] to get this scheduled. If there are any issues please let us know. Thank you.

## 2022-10-26 NOTE — ASSESSMENT & PLAN NOTE
It seems she has had subclinical hypothyroidism in the past. This doesn't warrant replacement thyroid hormone. Will repeat TFTs today. She has not been taking thyroid hormone because she doesn't want to take it on it's own and wait 1 hr to eat.

## 2022-10-26 NOTE — ASSESSMENT & PLAN NOTE
Intermittent, short lived, and not related to any specific activity. Suggest she be followed by NSGY for her shunt.

## 2022-10-26 NOTE — PROGRESS NOTES
Karmen Bailey is a 49y.o. year old female who is a new patient to me today (10/26/22). She was previous followed by Dr Adriana Bush. Assessment & Plan:   1. Hyperlipidemia, unspecified hyperlipidemia type  Assessment & Plan:  Controlled, cont statin. 2. Acquired hypothyroidism  Assessment & Plan: It seems she has had subclinical hypothyroidism in the past. This doesn't warrant replacement thyroid hormone. Will repeat TFTs today. She has not been taking thyroid hormone because she doesn't want to take it on it's own and wait 1 hr to eat. Orders:  -     TSH 3RD GENERATION; Future  -     T4, FREE; Future  3. Gastroesophageal reflux disease, unspecified whether esophagitis present  Assessment & Plan:   Controlled, cont PPI  4. Wrist weakness  Comments:  trouble opening jars and using can opener. Will ref to OT to work on this. She would like [facility] to come to her house. Orders:  -     REFERRAL TO OCCUPATIONAL THERAPY  5. COVID-19  Comments:  She is concerned about long COVID. Advised expectant management. She would like labs checked. Orders:  -     CBC WITH AUTOMATED DIFF; Future  -     METABOLIC PANEL, COMPREHENSIVE; Future  6. Hydrocephalus with operating shunt [facility])  Assessment & Plan:   Refer to NSGY to follow her for shunt maintenance. Orders:  -     REFERRAL TO NEUROSURGERY  7. Dizziness  Assessment & Plan:  Intermittent, short lived, and not related to any specific activity. Suggest she be followed by NSGY for her shunt. 8. Encounter for screening mammogram for malignant neoplasm of breast  -     KIRT MAMMO BI SCREENING INCL CAD;  Future         Health Maintenance   Flu vaccine: 9/2022  COVID vaccine: discussed bivalent booster  Tetanus vaccine: 6/2021  Shingles vaccine: due, will discuss  Pneumonia vaccine: UTD, repeat @65  Cervical cancer screening: will request records from Dr Alexandre Memos screening: will order today   Colon cancer screening: due 2028  DEXA:  Hep C: 6/2021  Lipid: check today  DM: 5.0% 2/2022  Healthcare decision maker: need to clarify  ACP:      RTC: 6 mo AWV and labs    Subjective:   Carlos Canada was seen today for Establish Care    Prior to the visit, Carlos Canada gave a letter to my  prior to our visit. The letter states . .. \"I do not want to overcome my all-or-nothing thinking, pessimism, and fixed mindset. I only want to be treated for PHYSICAL problems, no mental health treatment for me.\" . .. She also wrote a letter to my staff asking to be addressed as Carlos Canada or Corinne and does not want to be addressed as Ms Seth Granados. She also gave me a paper and a couple of by business cards with the message \"Trees are my friends\" and \"I hate open spaces, balconies, and high ceilings\". HLD - taking rosuvastatin     GERD - taking omeprazole    Congential Hydrocephalus - shunt. Not seeing Jenny Quintero since her retired. She is open to going to Dr Leona Merlin. She doesn't want to have an MRI. Hypothyroidism - stopped taking the medication because she didn't like waiting 1 hours to eat. Dizzy - this happens on and off since she got COVID. It lasts less than 1 minute. Worse when she looks at open spaces. She has not fallen in a long time but she is worried it may happen. She denies chest pain, palpitations, syncope. Reports having wrist pain due to \"weak wrists\". She occasionally drops things No numbness or tingling in the fingers. She feels she has \"butter fingers\". Has trouble opening jars and used a can opener. Doesn't like open spaces. Not wearing hearing aids because its too much for her ears to have hearing aids and a mask. Reports life is difficult - neighbors are homophobic (she identifies as bisexual) and push her towards [place]. She is on the waiting list for an aid through   Lives alone at [facility], has a friend that helps her from time to time. Mom recently diagnosed with dementia. Identifies as an anglophile. Likes Varsha Yee.      Reports her brain is gone, had COVID. Got sick on memorial day, lasted until due. She had brain fog and fatigue every since. She requests we check labs today. She went to therapy when she was younger but is not interested in going back. She denies SI or HI. Review of Systems   All other systems reviewed and are negative. 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    Migraine G43.909    Vitamin D deficiency E55.9    Hypothyroidism E03.9    Mixed obsessional thoughts and acts F42.2    Hyperlipidemia E78.5    Anxiety and depression F41.9, F32.A    Hearing loss H91.90    Allergic rhinitis J30.9    Diverticulosis K57.90    Hydrocephalus with operating shunt (HCC) G91.9    COVID-19 U07.1    Gastroesophageal reflux disease, unspecified whether esophagitis present K21.9       Past Medical History:   Diagnosis Date    Cerebral hemorrhage at birth     COVID-19 2022    Diverticulosis     GERD (gastroesophageal reflux disease)     Hearing loss     Hydrocephalus, congenital (HCC)     Hypothyroidism 2011    Impacted cerumen     Leiomyoma of uterus, unspecified     Memory disorder     Per PT    Migraine     Mild intellectual disabilities     Myopia     OCD (obsessive compulsive disorder) 2012    Pneumothorax of      Routine gynecological examination     Dr. Patsy Aguirre    Unspecified fetal growth retardation, 1,250-1,499 grams     Unspecified hearing loss     Vitamin D deficiency 2011     (ventriculoperitoneal) shunt status        Prior to Admission medications    Medication Sig Start Date End Date Taking?  Authorizing Provider   rosuvastatin (CRESTOR) 5 mg tablet TAKE 1 TABLET BY MOUTH EVERY NIGHT 22  Yes Tasha Benson, NP   omeprazole (PRILOSEC) 20 mg capsule TAKE 1 CAPSULE BY MOUTH DAILY 12/3/21  Yes Stacey Jacinto MD       PSHx    Past Surgical History:   Procedure Laterality Date    COLONOSCOPY N/A 2018    COLONOSCOPY performed by Crissy Reddy MD at [facility] ENDOSCOPY    HX COLONOSCOPY  11/202018    HX GYN  882803    fibroid embolization    HX OTHER SURGICAL  704668     shunt insertion    HX OTHER SURGICAL  093582    shunt replacement    HX OTHER SURGICAL  875942    shunt revision    HX OTHER SURGICAL  587060    shunt replacement       FH    Family History   Problem Relation Age of Onset    Heart Attack Father         31 Amber Reagan    Social History     Occupational History    Not on file   Tobacco Use    Smoking status: Never    Smokeless tobacco: Never   Vaping Use    Vaping Use: Never used   Substance and Sexual Activity    Alcohol use: Never    Drug use: Never    Sexual activity: Not Currently        The following sections were reviewed & updated as appropriate: Problem List, Allergies, PMH, PSH, FH, and SH. Objective:   Visit Vitals  /83   Pulse 96   Temp 98.4 °F (36.9 °C) (Temporal)   Resp 20   Ht 5' 2\" (1.575 m)   Wt 164 lb (74.4 kg)   SpO2 96%   BMI 30.00 kg/m²       Physical Exam  Eyes:      Extraocular Movements: Extraocular movements intact. Cardiovascular:      Rate and Rhythm: Normal rate and regular rhythm. Heart sounds: No murmur heard. Pulmonary:      Effort: Pulmonary effort is normal. No respiratory distress. Abdominal:      General: Bowel sounds are normal.      Palpations: Abdomen is soft. Musculoskeletal:      Right lower leg: No edema. Left lower leg: No edema. Neurological:      General: No focal deficit present. Mental Status: She is alert.       Comments: Using wheelchair but was able to stand up to put on coat and was stable   Psychiatric:         Mood and Affect: Mood normal.         Behavior: Behavior normal.            Fabiano Mas MD

## 2022-11-02 ENCOUNTER — PATIENT OUTREACH (OUTPATIENT)
Dept: CASE MANAGEMENT | Age: 57
End: 2022-11-02

## 2022-11-02 NOTE — PROGRESS NOTES
At request of [facility], contacted patient to discuss status of aid that she has requested through [employer]. Verified ID with two identifiers. Patient has medicare only. States she does not qualify for Medicaid. Unclear when that determination was made and by whom. Patient lives alone in an apartment and reports having post covid brain fog with memory concerns. She reports difficultly remembering words sometimes. She wants an aid to come to her apartment \"to watch over\" her especially when she is cooking. Very briefly discussed AL. Patient has hx of hydrocephalus with shunt. Dr. Jonatan Cartwright recommended follow up with [address] [place] [address]. Patient states she called Dr. Freedman Mode office and was advised to call neurology. They would see her only if there was an issue with the shunt function. Offered to assist with neurology appt. Pt states she does not like to wait on hold. Patient agreeable for SW referral.  Preferences: does not like to be called Ms. Miguel Sewell prefers [facility]. Patient wants to speak with a SW who is nice and patient and does not refer to a \"higher power\". Pt also states \" I don't like numbers, just words. \"    [facility] and scheduled patient to see Dr. Ron Blackwell 11/17/22 @10:15. Faxed Dr. Wisdom Failing note. Patient provided date and time.

## 2022-11-04 ENCOUNTER — PATIENT OUTREACH (OUTPATIENT)
Dept: CASE MANAGEMENT | Age: 57
End: 2022-11-04

## 2022-11-04 NOTE — PROGRESS NOTES
Social Work Note  11/4/2022    Social work referral received from Ant Guaman, RN, ACM for assistance with aide services in home. Chart reviewed. Attempted to reach patient. Voicemail left requesting return call.      ADAM Lunsford, ACSW, [facility]    Ambulatory Care Management   [phone]

## 2022-11-07 ENCOUNTER — PATIENT OUTREACH (OUTPATIENT)
Dept: CASE MANAGEMENT | Age: 57
End: 2022-11-07

## 2022-11-07 NOTE — PROGRESS NOTES
Initial Contact Social Work Note - Ambulatory  11/9/2022      Date of referral: 11/02/2022  Referral received from: Deirdre Gross RN, M  Reason for referral: Assistance with aide service coordination    Previous  Referral: No      Two Identifiers Verified: Yes    Insurance Provider: Medicare [address] [place] [address] Street:  POA:  Orin Smith  ([phone])      [place] Status: N/A    Community Providers:  [facility] Transportation      ADL Assistance:  Per patient report, she is able to complete ADLS, but needs supervision with cooking, help with groceries and IADLs    Housing/Living Concerns or Home Modification Needs: N/A    Transportation Concern: Patient utilizes [facility] for transportation services. Medication Cost Concern: None at this time    Medication Education or Adherence Concern: None at this time    Financial Concern(s): N/A  Patient reported that she is not Medicaid eligible. Income (only if applicable): N/A     Ability to Read/Write: Yes    Advance Care Plan: To be clarified in future call. Patient stated that she has a POA to handles all of her financial affairs. Other:   Received referral for assistance with aide services. Chart reviewed. Contacted patient for initial assessment. Introduced self, role, and purpose of call. Patient agreeable to care management services. Per chart, patient has shared that she prefers to be called 'Mahogany\". She lives alone in independent apartment and communicated that she wants and aide to assist with supervision of cooking, meals, ADLs. Patient conveyed that she is lonely at times and likes to be around people. SW mentioned Adult [facility] programs. Patient advised that she has considered centers, but \"likes to choose her activities and prefers 1:1 interaction\". Per patient, POA assists with grocery shopping and finances as patient does not drive.      Patient stated that she talked with \"Sangita\" at [employer] re: aide services. Verbal consent provided by patient to follow-up with  re: contact and potential aide services. Identified Needs:     Aide services - clarification of availability, patient's connection with   Clarify ACP, obtain copy of POA    Social Work Plan:    Follow-up with  re: patient contact   Speak with patient re: ACP  Follow-up with patient's POA     Method of Communication with Provider (if appropriate): chart routing        Goals Addressed                   This Visit's Progress       Chronic Disease     Supportive resources in place to maintain patient in the community (ie. Home Health, DME equipment, refer to, medication assistant plan, etc.)        11/07/2022  Patient advised that she needs aide assistance in home and is on waiting list at . Per report, patient is not Medicaid eligible. SW Plan:  Determine patient's connection with  and available aide services.     AML          ADAM Garcia, ACSW, [facility]    Ambulatory Care Management   [phone]

## 2022-11-18 ENCOUNTER — TRANSCRIBE ORDER (OUTPATIENT)
Dept: SCHEDULING | Age: 57
End: 2022-11-18

## 2022-11-18 DIAGNOSIS — G91.9 HYDROCEPHALUS (HCC): ICD-10-CM

## 2022-11-18 DIAGNOSIS — R42 DIZZINESS ON STANDING: Primary | ICD-10-CM

## 2022-11-28 ENCOUNTER — PATIENT OUTREACH (OUTPATIENT)
Dept: CASE MANAGEMENT | Age: 57
End: 2022-11-28

## 2022-11-28 NOTE — PROGRESS NOTES
Social Work Note  11/28/2022   Goals Addressed                   This Visit's Progress       Chronic Disease     Supportive resources in place to maintain patient in the community (ie. Home Health, DME equipment, refer to, medication assistant plan, etc.)        11/28/2022  Reviewed county/state waivers and services in attempt to locate program patient may be connected to for aide services. Spoke with patient who advised that she has not been assessed at home for aide services. Verbal consent by patient for SW to contact Wolfgang Mike (POA per patient - on HIPAA). Patient voiced preference for SW to talk with Ms. Roberto re: finances, Medicaid eligibility.  left for Ms. Roberto. Requested return call. SW Plan:  Follow-up with Ms. Roberto. AML    11/07/2022  Patient advised that she needs aide assistance in home and is on waiting list at . Per report, patient is not Medicaid eligible. SW Plan:  Determine patient's connection with  and available aide services.     AML          Burke Alarcon, MSW, ACSW, [facility]    Ambulatory Care Management   [phone]

## 2022-11-29 ENCOUNTER — PATIENT OUTREACH (OUTPATIENT)
Dept: CASE MANAGEMENT | Age: 57
End: 2022-11-29

## 2022-11-29 NOTE — PROGRESS NOTES
Social Work Note  11/29/2022   Goals Addressed                   This Visit's Progress       Chronic Disease     Supportive resources in place to maintain patient in the community (ie. Home Health, DME equipment, refer to, medication assistant plan, etc.)        11/29/2022  Received message from patient requesting that SW contact Fox Chan (patient's POA) to talk about an aide for patient. Per patient, \"I really think I need an aide\". Patient stated that she feels \"overwhelmed by the effects of long COVID\" and she stated that she would like POA to see if there is a way patient can qualify for Medicaid. Patient expressed feelings of stress related to mother's diagnosis of dementia. SW Plan:  Follow-up with POA. Awaiting return call from message left. AML    11/28/2022  Reviewed county/state waivers and services in attempt to locate program patient may be connected to for aide services. Spoke with patient who advised that she has not been assessed at home for aide services. Verbal consent by patient for SW to contact Fox Chan (POA per patient - on HIPAA). Patient voiced preference for SW to talk with Ms. Roberto re: finances, Medicaid eligibility. VM left for Ms. Roberto. Requested return call. SW Plan:  Follow-up with Ms. Roberto. AML    11/07/2022  Patient advised that she needs aide assistance in home and is on waiting list at . Per report, patient is not Medicaid eligible. SW Plan:  Determine patient's connection with  and available aide services.     AML          Nubia Caldera, MSW, ACSW, [facility]    Ambulatory Care Management   [phone]

## 2022-11-30 ENCOUNTER — HOSPITAL ENCOUNTER (OUTPATIENT)
Dept: CT IMAGING | Age: 57
Discharge: HOME | End: 2022-11-30
Attending: NEUROLOGICAL SURGERY
Payer: MEDICARE

## 2022-11-30 DIAGNOSIS — G91.9 HYDROCEPHALUS (HCC): ICD-10-CM

## 2022-11-30 DIAGNOSIS — R42 DIZZINESS ON STANDING: ICD-10-CM

## 2022-11-30 PROCEDURE — 70450 CT HEAD/BRAIN W/O DYE: CPT

## 2022-12-28 ENCOUNTER — PATIENT OUTREACH (OUTPATIENT)
Dept: CASE MANAGEMENT | Age: 57
End: 2022-12-28

## 2022-12-28 NOTE — PROGRESS NOTES
Social Work Note  12/28/2022     Goals Addressed                   This Visit's Progress       Chronic Disease     Supportive resources in place to maintain patient in the community (ie. Home Health, DME equipment, refer to, medication assistant plan, etc.)        12/28/2022   left for patient's POA, Masha Roberto. Requested return call. [facility]    11/29/2022  Received message from patient requesting that SW contact Fox Chan (patient's POA) to talk about an aide for patient. Per patient, \"I really think I need an aide\". Patient stated that she feels \"overwhelmed by the effects of long COVID\" and she stated that she would like POA to see if there is a way patient can qualify for Medicaid. Patient expressed feelings of stress related to mother's diagnosis of dementia. SW Plan:  Follow-up with POA. Awaiting return call from message left. [facility]    11/28/2022  Reviewed county/state waivers and services in attempt to locate program patient may be connected to for aide services. Spoke with patient who advised that she has not been assessed at home for aide services. Verbal consent by patient for SW to contact Fox Chan (POA per patient - on HIPAA). Patient voiced preference for SW to talk with Ms. Roberto re: finances, Medicaid eligibility.  left for Ms. Roberto. Requested return call. SW Plan:  Follow-up with Ms. Roberto. [facility]    11/07/2022  Patient advised that she needs aide assistance in home and is on waiting list at . Per report, patient is not Medicaid eligible. SW Plan:  Determine patient's connection with  and available aide services.     [facility]          Vic Martinez, ADAM, ACSW, [facility]    Ambulatory Care Management   [phone]

## 2023-01-04 ENCOUNTER — PATIENT OUTREACH (OUTPATIENT)
Dept: CASE MANAGEMENT | Age: 58
End: 2023-01-04

## 2023-01-04 NOTE — ACP (ADVANCE CARE PLANNING)
Advance Care Planning   Healthcare Decision Maker:     Spoke with patient's financial POA, Masha Roberto. Ms. Patrice Sims indicated that she assists both patient and mother. She advised that patient's next of kin is her mother, Vito Bhagat, and she resides at [facility] in the memory care unit. Ms. Patrice Sims stated that patient has completed medical POA paperwork naming Ms. Roberto as her health care decision maker. SW requested copy of medical POA documentation for files. Today we documented desired Decision Maker(s), who is (are) NOT Legal Next of Kin. ACP documents are required for decision maker authority.     Amalia Patel  [phone]    Estefanía Moreno MSW, ACSW, [facility]    Ambulatory Care Management   [phone]

## 2023-01-04 NOTE — PROGRESS NOTES
Social Work Note  1/4/2023    Received return message from Fox Chan, patient's POA (on HIPAA). Returned call. Verified patient with two identifiers. Introduced self, role, and purpose of call. Ms. Buddy Araujo advised that she serves as POA for patient and patient's mother. Patient's mother lives at [facility] in [place] [facility]. Ms. Buddy Araujo handles patient's financial affairs. SW inquired about status of Health Care Decision Maker. Ms. Buddy Araujo stated that patient has form completed with her apartment community indicating that she is patient's medical POA. SW requested copy for files. Discussed patient's desire for aide/ and specific needs. Asked about connection with  and patient's mention of waiting list for aides. Ms. Buddy Araujo indicated that she is not aware of any services/waivers that patient is connected with through DSS. Ms. Buddy Araujo indicated that having an aide/ several days a week is not financially feasible at this point, but that  services one day/week may be possible. Reviewed hourly rate for aides. SW to explore options for aide services and contact MsJed Roberto with information. Goals Addressed                   This Visit's Progress       Chronic Disease     Supportive resources in place to maintain patient in the community (ie. Home Health, DME equipment, refer to, medication assistant plan, etc.)        01/04/2023  Spoke with patient's friend, Bentio Roach, re: patient's desire for aide. Reviewed preferences and needs. Discussed option of aide with experience working with individuals having special needs. Requested copy of POA, medical POA paperwork. SW Plan:   Explore aide options and follow-up with POA. AML     12/28/2022  VM left for patient's POA, Masha Roberto. Requested return call.   AML    11/29/2022  Received message from patient requesting that SW contact Fox Chan (patient's POA) to talk about an aide for patient. Per patient, \"I really think I need an aide\". Patient stated that she feels \"overwhelmed by the effects of long COVID\" and she stated that she would like POA to see if there is a way patient can qualify for Medicaid. Patient expressed feelings of stress related to mother's diagnosis of dementia. SW Plan:  Follow-up with POA. Awaiting return call from message left. [facility]    11/28/2022  Reviewed county/state waivers and services in attempt to locate program patient may be connected to for aide services. Spoke with patient who advised that she has not been assessed at home for aide services. Verbal consent by patient for SW to contact Laura Kurtz (POA per patient - on HIPAA). Patient voiced preference for SW to talk with Ms. Roberto re: finances, Medicaid eligibility. VM left for Ms. Roberto. Requested return call. SW Plan:  Follow-up with Ms. Roberto. [facility]    11/07/2022  Patient advised that she needs aide assistance in home and is on waiting list at . Per report, patient is not Medicaid eligible. SW Plan:  Determine patient's connection with  and available aide services.     AML          Natalie Perez, MSW, ACSW, [facility]    Ambulatory Care Management   [phone]

## 2023-01-09 ENCOUNTER — PATIENT OUTREACH (OUTPATIENT)
Dept: CASE MANAGEMENT | Age: 58
End: 2023-01-09

## 2023-01-09 NOTE — PROGRESS NOTES
Called [facility] and requested last progress note from November. Next appt with Dr. Caron De La Fuente 2/16/23.

## 2023-02-02 ENCOUNTER — PATIENT OUTREACH (OUTPATIENT)
Dept: CASE MANAGEMENT | Age: 58
End: 2023-02-02

## 2023-02-03 NOTE — PROGRESS NOTES
Social Work Note  2/3/2023      Spoke with Rose Mary Zhou ([address]) and Herve Sosa [facility]) re: aide services. VM left for [employer]. Options for private pay in-home services explored. [facility]ed to [facility], patient's [facility]. VM left requesting return call.      Vic Martinez, MSW, ACSW, [facility]    Ambulatory Care Management   [phone]

## 2023-02-10 ENCOUNTER — OFFICE VISIT (OUTPATIENT)
Dept: NEUROLOGY | Age: 58
End: 2023-02-10
Payer: MEDICARE

## 2023-02-10 VITALS
WEIGHT: 162 LBS | DIASTOLIC BLOOD PRESSURE: 78 MMHG | BODY MASS INDEX: 29.63 KG/M2 | HEART RATE: 88 BPM | OXYGEN SATURATION: 96 % | SYSTOLIC BLOOD PRESSURE: 132 MMHG

## 2023-02-10 DIAGNOSIS — E03.9 ACQUIRED HYPOTHYROIDISM: ICD-10-CM

## 2023-02-10 DIAGNOSIS — R41.3 COMPLAINTS OF MEMORY DISTURBANCE: Primary | ICD-10-CM

## 2023-02-10 NOTE — PROGRESS NOTES
Neurology Clinic Follow up Note    Patient ID:  Kody Fish  [id number]  56 y.o.  1965      Ms. Madonna Caban is here for follow up today of  Chief Complaint   Patient presents with    Memory Loss     Pt reports memory loss seems to be worse           Last Appointment With Me:  2022    Previously seen for tremors. H/O L parietal SDH with congenital hydrocephalus s/p VPS. Interval History:   She reports new onset of word finding deficits since her last visit. She is receiving assistance with her finances. She has difficulty preparing meals-has burnt food in her microwave. She is administered her own medications. No apparent issues with this. She denies prior cognitive testing. She admits to non-compliance with thyroid medications (h/o hypothyroidism). Head CT was repeated 2022 without significant change in the complex calcified left parietal extra-axial  collection, ventricular size, or right parietal approach ventricular drain. PMHx/ PSHx/ FHx/ SHx:  Reviewed and unchanged previous visit. Past Medical History:   Diagnosis Date    Cerebral hemorrhage at birth     COVID-19 2022    Diverticulosis     GERD (gastroesophageal reflux disease)     Hearing loss     Hydrocephalus, congenital (HCC)     Hypothyroidism 2011    Impacted cerumen     Leiomyoma of uterus, unspecified     Memory disorder     Per PT    Migraine     Mild intellectual disabilities     Myopia     OCD (obsessive compulsive disorder) 2012    Pneumothorax of      Routine gynecological examination     Dr. Josephine Pace    Unspecified fetal growth retardation, 1,250-1,499 grams     Unspecified hearing loss     Vertigo     Vitamin D deficiency 2011     (ventriculoperitoneal) shunt status          ROS:  Comprehensive review of systems negative except for as noted above.        Objective:       Meds:  Current Outpatient Medications   Medication Sig Dispense Refill    rosuvastatin (CRESTOR) 5 mg tablet TAKE 1 TABLET BY MOUTH EVERY NIGHT 30 Tablet 5    omeprazole (PRILOSEC) 20 mg capsule TAKE 1 CAPSULE BY MOUTH DAILY 90 Capsule 3       Exam:  Visit Vitals  /78   Pulse 88   Wt 162 lb (73.5 kg)   SpO2 96%   BMI 29.63 kg/m²     NEUROLOGICAL EXAM:  General: Awake, alert, speech fluent. Oriented to date, place, self. No aphasia, dysarthria. CN: PERRL, EOMI without nystagmus, VFF to confrontation, facial sensation and strength are normal and symmetric, hearing is intact to finger rub bilaterally, palate and tongue movements are intact and symmetric. Motor: Normal tone, bulk and strength bilaterally. Reflexes: 1/4 and symmetric, plantar stimulation is flexor. Coordination: FNF, MARCOS, HTS intact. Sensation: LT intact throughout. Gait: Normal-based and steady. LABS  Results for orders placed or performed in visit on 02/01/22   LIPID PANEL   Result Value Ref Range    Cholesterol, total 161 <200 MG/DL    Triglyceride 64 <150 MG/DL    HDL Cholesterol 67 MG/DL    LDL, calculated 81.2 0 - 100 MG/DL    VLDL, calculated 12.8 MG/DL    CHOL/HDL Ratio 2.4 0.0 - 5.0     METABOLIC PANEL, COMPREHENSIVE   Result Value Ref Range    Sodium 140 136 - 145 mmol/L    Potassium 4.0 3.5 - 5.1 mmol/L    Chloride 109 (H) 97 - 108 mmol/L    CO2 26 21 - 32 mmol/L    Anion gap 5 5 - 15 mmol/L    Glucose 92 65 - 100 mg/dL    BUN 8 6 - 20 MG/DL    Creatinine 0.75 0.55 - 1.02 MG/DL    BUN/Creatinine ratio 11 (L) 12 - 20      GFR est AA >60 >60 ml/min/1.73m2    GFR est non-AA >60 >60 ml/min/1.73m2    Calcium 9.9 8.5 - 10.1 MG/DL    Bilirubin, total 0.4 0.2 - 1.0 MG/DL    ALT (SGPT) 22 12 - 78 U/L    AST (SGOT) 6 (L) 15 - 37 U/L    Alk.  phosphatase 110 45 - 117 U/L    Protein, total 8.0 6.4 - 8.2 g/dL    Albumin 4.3 3.5 - 5.0 g/dL    Globulin 3.7 2.0 - 4.0 g/dL    A-G Ratio 1.2 1.1 - 2.2     T4, FREE   Result Value Ref Range    T4, Free 1.2 0.8 - 1.5 NG/DL   TSH 3RD GENERATION   Result Value Ref Range    TSH 2.90 0.36 - 3.74 uIU/mL   VITAMIN D, 25 HYDROXY Result Value Ref Range    Vitamin D 25-Hydroxy 26.7 (L) 30 - 100 ng/mL   HEMOGLOBIN A1C WITH EAG   Result Value Ref Range    Hemoglobin A1c 5.0 4.0 - 5.6 %    Est. average glucose 97 mg/dL   CK   Result Value Ref Range    CK 70 26 - 192 U/L   CBC WITH AUTOMATED DIFF   Result Value Ref Range    WBC 6.5 3.6 - 11.0 K/uL    RBC 4.45 3.80 - 5.20 M/uL    HGB 13.6 11.5 - 16.0 g/dL    HCT 42.7 35.0 - 47.0 %    MCV 96.0 80.0 - 99.0 FL    MCH 30.6 26.0 - 34.0 PG    MCHC 31.9 30.0 - 36.5 g/dL    RDW 12.1 11.5 - 14.5 %    PLATELET 480 058 - 578 K/uL    MPV 10.4 8.9 - 12.9 FL    NRBC 0.0 0  WBC    ABSOLUTE NRBC 0.00 0.00 - 0.01 K/uL    NEUTROPHILS 70 32 - 75 %    LYMPHOCYTES 22 12 - 49 %    MONOCYTES 6 5 - 13 %    EOSINOPHILS 1 0 - 7 %    BASOPHILS 1 0 - 1 %    IMMATURE GRANULOCYTES 0 0.0 - 0.5 %    ABS. NEUTROPHILS 4.6 1.8 - 8.0 K/UL    ABS. LYMPHOCYTES 1.4 0.8 - 3.5 K/UL    ABS. MONOCYTES 0.4 0.0 - 1.0 K/UL    ABS. EOSINOPHILS 0.0 0.0 - 0.4 K/UL    ABS. BASOPHILS 0.0 0.0 - 0.1 K/UL    ABS. IMM. GRANS. 0.0 0.00 - 0.04 K/UL    DF AUTOMATED         IMAGING:  CT Results (most recent):  Results from Hospital Encounter encounter on 11/30/22    CT HEAD WO CONT    Narrative  EXAM: CT HEAD WO CONT    INDICATION: Dizziness on standing    COMPARISON: May 2018. CONTRAST: None. TECHNIQUE: Unenhanced CT of the head was performed using 5 mm images. Brain and  bone windows were generated. Coronal and sagittal reformats. CT dose reduction  was achieved through use of a standardized protocol tailored for this  examination and automatic exposure control for dose modulation. FINDINGS:  No significant change in the complex partially calcified left parietal  extra-axial collection with mass effect upon the left parietal and occipital  lobes. There is a right parietal approach ventricular drain in place. Ventricles  are slitlike, unchanged from prior examination. No evidence of acute hemorrhage  or herniation.  Basal cisterns are clear. Orbits and globes are within normal  limits. Calvarium and scalp soft tissues are within normal limits. Impression  No significant change in the complex calcified left parietal extra-axial  collection, ventricular size, or right parietal approach ventricular drain. Assessment:     Encounter Diagnoses     ICD-10-CM ICD-9-CM   1. Complaints of memory disturbance  R41.3 780.93   2. Acquired hypothyroidism  E03.9 25.9   56year old female with a h/o L parietal SDH, congenital hydrocephalus s/p VPS, hypothyroidism, previously seen for physiologic L hand tremors returning due to new concerns regarding memory impairment with word finding difficulty. She also had a recent incident involving food burning due to incorrect use of her microwave. She mentions she is not compliant with her thyroid medications and has not repeated thyroid labs in some time. Will obtain updated labs today to exclude any contributing metabolic derangements. Head CT was recently repeated 11/30/22 and without acute pathology, noted stable calcified L parietal extra-axial collection, R parietal VPS without hydrocephalus. She refuses MRI Brain today but is willing to proceed with neuropsychologic testing to establish a baseline for her memory. Will see her in follow up after additional testing is completed. Plan:   TSH, free T3/4, B12  Neuropsychologic testing     Follow-up and Dispositions    Return in about 6 months (around 8/10/2023). I have discussed the diagnosis with the patient today and the intended plan as seen in the above orders with both the patient as well as referring provider and/or PCP via electronic correspondence. The patient has received an after-visit summary and questions were answered concerning future plans.      Signed:  Charity Landaverde DO  2/10/2023  10:03 AM

## 2023-02-11 LAB
T3FREE SERPL-MCNC: 3.1 PG/ML (ref 2–4.4)
T4 FREE SERPL-MCNC: 1.18 NG/DL (ref 0.82–1.77)
TSH SERPL DL<=0.005 MIU/L-ACNC: 6.96 UIU/ML (ref 0.45–4.5)
VIT B12 SERPL-MCNC: 865 PG/ML (ref 232–1245)

## 2023-02-15 ENCOUNTER — TELEPHONE (OUTPATIENT)
Dept: NEUROLOGY | Age: 58
End: 2023-02-15

## 2023-02-15 NOTE — TELEPHONE ENCOUNTER
Called the Pt. Had to leave a voice mail per Dr. Duran Lot:  TSH is elevated with normal T3/T4. Advise follow up with her PCP or Endocrinology. Also trying to find a DrJed Closer to the Pt. For neuro psych testing.  Will call her back to let her know which

## 2023-02-16 ENCOUNTER — DOCUMENTATION ONLY (OUTPATIENT)
Dept: NEUROLOGY | Age: 58
End: 2023-02-16

## 2023-02-16 NOTE — PROGRESS NOTES
Faxed referral to Dr. Isabella Riggs for neuropsych testing.  Received confirmation it was sent successfully

## 2023-02-20 ENCOUNTER — TELEPHONE (OUTPATIENT)
Dept: INTERNAL MEDICINE CLINIC | Age: 58
End: 2023-02-20

## 2023-02-20 NOTE — TELEPHONE ENCOUNTER
----- Message from Nik Wallace sent at 2/20/2023 10:54 AM EST -----  Subject: Message to Provider    QUESTIONS  Information for Provider? PT would like PCP Amanda Soliman to know that   she will not be returning to [facility] due to the fact that she is   afraid of the building and would feel better going to [facility]. PT   would like to apologize for leaving and would like to Thank PCP for   everything.   ---------------------------------------------------------------------------  --------------  [employer] [employer]  [phone]; OK to leave message on voicemail  ---------------------------------------------------------------------------  --------------  SCRIPT ANSWERS  Relationship to Patient?  Self

## 2023-03-14 ENCOUNTER — PATIENT OUTREACH (OUTPATIENT)
Dept: CASE MANAGEMENT | Age: 58
End: 2023-03-14

## 2023-03-14 NOTE — PROGRESS NOTES
Social Work Note    Patient has graduated from the Complex Case Management  program on 03/14/2023. At this time all Social Work goals have been completed and the patient/family has the ability to self-manage. No further Social Work follow-up scheduled. Patient/family has Social Work contact information for further questions, concerns, or needs. Goals Addressed                   This Visit's Progress       Chronic Disease     COMPLETED: Supportive resources in place to maintain patient in the community (ie. Home Health, DME equipment, refer to, medication assistant plan, etc.)   On track     03/14/2023  Second attempt to reach patient's POA, Fox Chan. Contact information for agencies providing aide services left on . [facility] hospital    01/04/2023  Spoke with patient's friend, Haresh Dowell, re: patient's desire for aide. Reviewed preferences and needs. Discussed option of aide with experience working with individuals having special needs. Requested copy of POA, medical POA paperwork.  Plan:   Explore aide options and follow-up with POA. [facility] hospital     12/28/2022   left for patient's POA, Masha Roberto. Requested return call. Critical access hospital    11/29/2022  Received message from patient requesting that SW contact Fox Chan (patient's POA) to talk about an aide for patient. Per patient, \"I really think I need an aide\". Patient stated that she feels \"overwhelmed by the effects of long COVID\" and she stated that she would like POA to see if there is a way patient can qualify for Medicaid. Patient expressed feelings of stress related to mother's diagnosis of dementia. SW Plan:  Follow-up with POA. Awaiting return call from message left. Critical access hospital    11/28/2022  Reviewed county/state waivers and services in attempt to locate program patient may be connected to for aide services. Spoke with patient who advised that she has not been assessed at home for aide services.   Verbal consent by patient for SW to contact Fox Chan (POA per patient - on HIPAA). Patient voiced preference for SW to talk with Ms. Roberto re: finances, Medicaid eligibility. VM left for Ms. Roberto. Requested return call. SW Plan:  Follow-up with Ms. Roberto. AML    11/07/2022  Patient advised that she needs aide assistance in home and is on waiting list at . Per report, patient is not Medicaid eligible. SW Plan:  Determine patient's connection with  and available aide services.     AML              Patient's upcoming visits:    Future Appointments   Date Time Provider Fox Juarez   4/18/2023 12:30 PM DO KISHAN Moore BS AMB   8/11/2023  9:00 AM DO ISABELLA Serrano BS AMB      Ismael Win MSW, ACSW, Critical access hospital    Ambulatory Care Management   [phone]

## 2023-04-18 ENCOUNTER — OFFICE VISIT (OUTPATIENT)
Dept: PRIMARY CARE CLINIC | Age: 58
End: 2023-04-18
Payer: MEDICARE

## 2023-04-18 VITALS
SYSTOLIC BLOOD PRESSURE: 147 MMHG | DIASTOLIC BLOOD PRESSURE: 79 MMHG | OXYGEN SATURATION: 99 % | RESPIRATION RATE: 16 BRPM | WEIGHT: 162 LBS | HEIGHT: 62 IN | HEART RATE: 72 BPM | TEMPERATURE: 97.1 F | BODY MASS INDEX: 29.81 KG/M2

## 2023-04-18 DIAGNOSIS — E03.9 ACQUIRED HYPOTHYROIDISM: Primary | ICD-10-CM

## 2023-04-18 DIAGNOSIS — E55.9 VITAMIN D DEFICIENCY: ICD-10-CM

## 2023-04-18 DIAGNOSIS — E78.5 HYPERLIPIDEMIA, UNSPECIFIED HYPERLIPIDEMIA TYPE: ICD-10-CM

## 2023-04-18 DIAGNOSIS — G91.9 HYDROCEPHALUS WITH OPERATING SHUNT (HCC): ICD-10-CM

## 2023-04-18 DIAGNOSIS — G91.0 COMMUNICATING HYDROCEPHALUS (HCC): ICD-10-CM

## 2023-04-18 PROCEDURE — G8417 CALC BMI ABV UP PARAM F/U: HCPCS | Performed by: FAMILY MEDICINE

## 2023-04-18 PROCEDURE — G8427 DOCREV CUR MEDS BY ELIG CLIN: HCPCS | Performed by: FAMILY MEDICINE

## 2023-04-18 PROCEDURE — G9717 DOC PT DX DEP/BP F/U NT REQ: HCPCS | Performed by: FAMILY MEDICINE

## 2023-04-18 PROCEDURE — 99204 OFFICE O/P NEW MOD 45 MIN: CPT | Performed by: FAMILY MEDICINE

## 2023-04-18 PROCEDURE — G9899 SCRN MAM PERF RSLTS DOC: HCPCS | Performed by: FAMILY MEDICINE

## 2023-04-18 PROCEDURE — 3017F COLORECTAL CA SCREEN DOC REV: CPT | Performed by: FAMILY MEDICINE

## 2023-04-18 RX ORDER — MULTIVITAMIN
1 CAPSULE ORAL DAILY
COMMUNITY

## 2023-04-18 RX ORDER — LEVOTHYROXINE SODIUM 50 UG/1
TABLET ORAL
COMMUNITY

## 2023-04-18 RX ORDER — CHOLECALCIFEROL (VITAMIN D3) 125 MCG
CAPSULE ORAL
COMMUNITY

## 2023-04-18 NOTE — PROGRESS NOTES
Chief Complaint   Patient presents with    Establish Care    Headache     Visit Vitals  BP (!) 147/79 (BP 1 Location: Right upper arm, BP Patient Position: Sitting, BP Cuff Size: Small adult)   Pulse 72   Temp 97.1 °F (36.2 °C) (Temporal)   Resp 16   Ht 5' 2\" (1.575 m)   Wt 162 lb (73.5 kg)   SpO2 99%   BMI 29.63 kg/m²     1. \"Have you been to the ER, urgent care clinic since your last visit? Hospitalized since your last visit? \" no    2. \"Have you seen or consulted any other health care providers outside of the [address] [place] [address] since your last visit? \" GYN,Neurology Eye     3. For patients aged 39-70: Has the patient had a colonoscopy / FIT/ Cologuard? 5 years ago      If the patient is female:    4. For patients aged 41-77: Has the patient had a mammogram within the past 2 years?  Scheduled this month

## 2023-04-18 NOTE — PROGRESS NOTES
HPI     Chief Complaint   Patient presents with    [facility]    Headache     She is a 56 y.o. female who presents to [facility]. [facility]  Patient is somewhat of a poor historian. 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 Congenital hydrocephaus with  shunt    Hx intracerebral hemorrhage at birth, Congenital hydrocephalus with VPS. Followed by neuro    Hypothyroidism, poor compliance with medication. Having trouble remembering things and concentrating. Dizziness, chronic problem. States this is exacerbated by large open spaces. Chronic feelings of depressed mood. States she does not want any therapy for this. Lives in a group home. - Chronic medical problems:  Past Medical History:   Diagnosis Date    Cerebral hemorrhage at birth     COVID-19 2022    Diverticulosis     GERD (gastroesophageal reflux disease)     Hearing loss     Hydrocephalus, congenital (HCC)     Hypothyroidism 2011    Impacted cerumen     Leiomyoma of uterus, unspecified     Memory disorder     Per PT    Migraine     Mild intellectual disabilities     Myopia     OCD (obsessive compulsive disorder) 2012    Pneumothorax of      Routine gynecological examination     Dr. Faye Gan    Unspecified fetal growth retardation, 1,250-1,499 grams     Unspecified hearing loss     Vertigo     Vitamin D deficiency 2011     (ventriculoperitoneal) shunt status      - Current medications:   Current Outpatient Medications   Medication Sig    levothyroxine (SYNTHROID) 50 mcg tablet Take  by mouth Daily (before breakfast). cholecalciferol, vitamin D3, (Vitamin D3) 50 mcg (2,000 unit) tab Take  by mouth.    multivitamin capsule Take 1 Capsule by mouth daily.     rosuvastatin (CRESTOR) 5 mg tablet TAKE 1 TABLET BY MOUTH EVERY NIGHT    omeprazole (PRILOSEC) 20 mg capsule TAKE 1 CAPSULE BY MOUTH DAILY     No current facility-administered medications for this visit.     - Family history:   Family History   Problem Relation Age of Onset    Heart Attack Father      - Allergies: Allergies   Allergen Reactions    Pcn [Penicillins] Unknown (comments)     Pt does not remember the reaction and she has taken amoxil without any problems     Sulfa (Sulfonamide Antibiotics) Other (comments)     Not sure      - Surgical history:   Past Surgical History:   Procedure Laterality Date    COLONOSCOPY N/A 11/20/2018    COLONOSCOPY performed by Selvin Hickman MD at [facility] ENDOSCOPY    HX COLONOSCOPY  11/202018    HX GYN  668761    fibroid embolization    HX OTHER SURGICAL  576655     shunt insertion    HX OTHER SURGICAL  004592    shunt replacement    HX OTHER SURGICAL  199571    shunt revision    HX OTHER SURGICAL  007875    shunt replacement     - Social history (sexually active, occupation, smoker, etoh use, etc):   Social History     Socioeconomic History    Marital status: SINGLE     Spouse name: Not on file    Number of children: Not on file    Years of education: Not on file    Highest education level: Not on file   Occupational History    Not on file   Tobacco Use    Smoking status: Never    Smokeless tobacco: Never   Vaping Use    Vaping Use: Never used   Substance and Sexual Activity    Alcohol use: Never    Drug use: Never    Sexual activity: Not Currently   Other Topics Concern    Not on file   Social History Narrative    Not on file     Social Determinants of Health     Financial Resource Strain: Not on file   Food Insecurity: Not on file   Transportation Needs: Not on file   Physical Activity: Not on file   Stress: Not on file   Social Connections: Not on file   Intimate Partner Violence: Not on file   Housing Stability: Not on file         Review of Systems  General : Denies fever, chills, unintentional weight loss. Cardiac : Denies chest pain, shortness of breath, orthopnea, PND. Pulm : Denies coughing, hemoptysis, dyspnea.    GI: Denies abd pain, vomiting, change in bowel movements, black/bloody stool. Neuro : Denies syncope or presyncope. Denies focal neuro symptoms. Reviewed PmHx, RxHx, FmHx, SocHx, AllgHx and updated and dated in the chart. Physical Exam:  Visit Vitals  BP (!) 147/79 (BP 1 Location: Right upper arm, BP Patient Position: Sitting, BP Cuff Size: Small adult)   Pulse 72   Temp 97.1 °F (36.2 °C) (Temporal)   Resp 16   Ht 5' 2\" (1.575 m)   Wt 162 lb (73.5 kg)   SpO2 99%   BMI 29.63 kg/m²     Physical Exam  Vitals reviewed. Constitutional:       Appearance: Normal appearance. HENT:      Head: Normocephalic and atraumatic. Cardiovascular:      Rate and Rhythm: Normal rate and regular rhythm. Pulses: Normal pulses. Heart sounds: Normal heart sounds. Pulmonary:      Effort: Pulmonary effort is normal.      Breath sounds: Normal breath sounds. Musculoskeletal:      Right lower leg: No edema. Left lower leg: No edema. Skin:     General: Skin is warm and dry. Neurological:      Mental Status: She is alert. Assessment / Plan     Diagnoses and all orders for this visit:    1. Acquired hypothyroidism  -     TSH 3RD GENERATION; Future    2. Hyperlipidemia, unspecified hyperlipidemia type  -     METABOLIC PANEL, COMPREHENSIVE; Future  -     LIPID PANEL; Future    3. Vitamin D deficiency  -     VITAMIN D, 25 HYDROXY; Future    4. Communicating hydrocephalus ([facility]    5. Hydrocephalus with operating shunt [facility])           Obtain medical records    I have discussed the diagnosis with the patient and the intended plan as seen in the above orders.         Jerrell Noonan DO

## 2023-04-20 ENCOUNTER — TELEPHONE (OUTPATIENT)
Dept: PRIMARY CARE CLINIC | Age: 58
End: 2023-04-20

## 2023-04-20 DIAGNOSIS — E03.9 ACQUIRED HYPOTHYROIDISM: ICD-10-CM

## 2023-04-20 DIAGNOSIS — E78.5 HYPERLIPIDEMIA, UNSPECIFIED HYPERLIPIDEMIA TYPE: Primary | ICD-10-CM

## 2023-04-20 DIAGNOSIS — E55.9 VITAMIN D DEFICIENCY: ICD-10-CM

## 2023-04-20 NOTE — TELEPHONE ENCOUNTER
Patient called back, stated she tried to get labwork done and they were unable to get it. She will need to come back and orders need to be placed again in system.

## 2023-04-23 DIAGNOSIS — Z12.31 VISIT FOR SCREENING MAMMOGRAM: Primary | ICD-10-CM

## 2023-04-24 ENCOUNTER — TELEPHONE (OUTPATIENT)
Dept: PRIMARY CARE CLINIC | Age: 58
End: 2023-04-24

## 2023-04-24 ENCOUNTER — HOSPITAL ENCOUNTER (OUTPATIENT)
Dept: MAMMOGRAPHY | Age: 58
Discharge: HOME | End: 2023-04-24
Attending: OBSTETRICS & GYNECOLOGY
Payer: MEDICARE

## 2023-04-24 DIAGNOSIS — Z12.31 VISIT FOR SCREENING MAMMOGRAM: ICD-10-CM

## 2023-04-24 PROCEDURE — 77067 SCR MAMMO BI INCL CAD: CPT

## 2023-05-11 RX ORDER — LEVOTHYROXINE SODIUM 0.05 MG/1
TABLET ORAL
COMMUNITY
Start: 2023-02-11 | End: 2023-05-12 | Stop reason: SDUPTHER

## 2023-05-12 RX ORDER — LEVOTHYROXINE SODIUM 0.05 MG/1
50 TABLET ORAL DAILY
Qty: 30 TABLET | Refills: 2 | Status: SHIPPED | OUTPATIENT
Start: 2023-05-12

## 2023-05-23 ENCOUNTER — TELEPHONE (OUTPATIENT)
Age: 58
End: 2023-05-23

## 2023-06-06 RX ORDER — ROSUVASTATIN CALCIUM 5 MG/1
5 TABLET, COATED ORAL NIGHTLY
Qty: 90 TABLET | Refills: 2 | Status: SHIPPED | OUTPATIENT
Start: 2023-06-06

## 2023-06-06 NOTE — TELEPHONE ENCOUNTER
Patient needs a refill on her Rosuvastatin sent to [facility] [address] on [address]

## 2023-08-07 ENCOUNTER — TELEPHONE (OUTPATIENT)
Dept: PRIMARY CARE CLINIC | Facility: CLINIC | Age: 58
End: 2023-08-07

## 2023-08-07 DIAGNOSIS — E03.9 ACQUIRED HYPOTHYROIDISM: Primary | ICD-10-CM

## 2023-08-09 DIAGNOSIS — E03.9 ACQUIRED HYPOTHYROIDISM: ICD-10-CM

## 2023-08-10 LAB — TSH SERPL DL<=0.05 MIU/L-ACNC: 3.36 UIU/ML (ref 0.36–3.74)

## 2023-08-11 ENCOUNTER — OFFICE VISIT (OUTPATIENT)
Age: 58
End: 2023-08-11
Payer: MEDICARE

## 2023-08-11 VITALS
BODY MASS INDEX: 29.63 KG/M2 | SYSTOLIC BLOOD PRESSURE: 120 MMHG | OXYGEN SATURATION: 98 % | DIASTOLIC BLOOD PRESSURE: 78 MMHG | WEIGHT: 162 LBS | HEART RATE: 83 BPM

## 2023-08-11 DIAGNOSIS — R41.9 COGNITIVE COMPLAINTS WITH NORMAL NEUROPSYCHOLOGICAL EXAM: Primary | ICD-10-CM

## 2023-08-11 DIAGNOSIS — F43.25 ADJUSTMENT DISORDER WITH MIXED DISTURBANCE OF EMOTIONS AND CONDUCT: ICD-10-CM

## 2023-08-11 DIAGNOSIS — E03.9 HYPOTHYROIDISM, UNSPECIFIED TYPE: ICD-10-CM

## 2023-08-11 PROCEDURE — 4004F PT TOBACCO SCREEN RCVD TLK: CPT | Performed by: PSYCHIATRY & NEUROLOGY

## 2023-08-11 PROCEDURE — 3017F COLORECTAL CA SCREEN DOC REV: CPT | Performed by: PSYCHIATRY & NEUROLOGY

## 2023-08-11 PROCEDURE — G8427 DOCREV CUR MEDS BY ELIG CLIN: HCPCS | Performed by: PSYCHIATRY & NEUROLOGY

## 2023-08-11 PROCEDURE — 99213 OFFICE O/P EST LOW 20 MIN: CPT | Performed by: PSYCHIATRY & NEUROLOGY

## 2023-08-11 PROCEDURE — G8419 CALC BMI OUT NRM PARAM NOF/U: HCPCS | Performed by: PSYCHIATRY & NEUROLOGY

## 2023-08-11 NOTE — PROGRESS NOTES
Patient ID:  Nino Waldron  [id number]  1965        Ms. Brittany Torres is here for follow up today of  Chief Complaint   Patient presents with    Other     Pt returning for H/O memory disturbance  Pt reports no changes          Last Appointment With Me:  2/10/2023    H/O L parietal SDH with congenital hydrocephalus s/p VPS  Interval History:   She returns for follow up of subjective cognitive symptoms. She completed recent neuropsychologic testing which did not reveal memory impairment or evolving dementia process, noted adjustment disorder with mixed emotional disturbance. She is not interested in seeing Psychology or Psychiatry at this time for above. She is compliant with thyroid medication as prescribed. Recent thyroid levels were normal.      PMHx/ PSHx/ FHx/ SHx:  Reviewed and unchanged previous visit. Past Medical History:   Diagnosis Date    Cerebral hemorrhage at birth     COVID-19 2022    Diverticulosis     GERD (gastroesophageal reflux disease)     Hearing loss     Hydrocephalus, congenital (HCC)     Hypothyroidism 2011    Impacted cerumen     Leiomyoma of uterus, unspecified     Memory disorder     Per PT    Migraine     Mild intellectual disabilities     Myopia     OCD (obsessive compulsive disorder) 2012    Pneumothorax of      Routine gynecological examination     Dr. Latasha Hussein    Unspecified fetal growth retardation, 1,250-1,499 grams     Unspecified hearing loss     Vertigo     Vitamin D deficiency 2011     (ventriculoperitoneal) shunt status        ROS:  Comprehensive review of systems negative except for as noted above.               Objective:       Meds:  Current Outpatient Medications   Medication Sig Dispense Refill    rosuvastatin (CRESTOR) 5 MG tablet Take 1 tablet by mouth nightly 90 tablet 2    levothyroxine (SYNTHROID) 50 MCG tablet Take 1 tablet by mouth Daily 30 tablet 2    omeprazole (PRILOSEC) 20 MG delayed release capsule TAKE 1 CAPSULE BY MOUTH

## 2023-09-07 RX ORDER — LEVOTHYROXINE SODIUM 0.05 MG/1
50 TABLET ORAL DAILY
Qty: 30 TABLET | Refills: 2 | Status: SHIPPED | OUTPATIENT
Start: 2023-09-07

## 2023-10-27 ENCOUNTER — TELEPHONE (OUTPATIENT)
Dept: PRIMARY CARE CLINIC | Facility: CLINIC | Age: 58
End: 2023-10-27

## 2023-10-27 NOTE — TELEPHONE ENCOUNTER
----- Message from Angel Ulloa sent at 10/27/2023  1:42 PM EDT -----  Subject: Message to Provider    QUESTIONS  Information for Provider? Pt called back in regards of a missed   appointment . Please reach out to pt to return the miss call she received   today .   ---------------------------------------------------------------------------  --------------  Narayan Short [facility]  [phone]; OK to leave message on voicemail  ---------------------------------------------------------------------------  --------------  SCRIPT ANSWERS  undefined

## 2023-11-03 ENCOUNTER — OFFICE VISIT (OUTPATIENT)
Dept: PRIMARY CARE CLINIC | Facility: CLINIC | Age: 58
End: 2023-11-03

## 2023-11-03 VITALS
TEMPERATURE: 97.2 F | RESPIRATION RATE: 17 BRPM | BODY MASS INDEX: 32.2 KG/M2 | HEIGHT: 62 IN | OXYGEN SATURATION: 96 % | HEART RATE: 83 BPM | DIASTOLIC BLOOD PRESSURE: 78 MMHG | WEIGHT: 175 LBS | SYSTOLIC BLOOD PRESSURE: 119 MMHG

## 2023-11-03 DIAGNOSIS — G91.0 COMMUNICATING HYDROCEPHALUS (HCC): ICD-10-CM

## 2023-11-03 DIAGNOSIS — Z00.00 MEDICARE ANNUAL WELLNESS VISIT, SUBSEQUENT: ICD-10-CM

## 2023-11-03 DIAGNOSIS — E78.2 MIXED HYPERLIPIDEMIA: ICD-10-CM

## 2023-11-03 DIAGNOSIS — E03.9 ACQUIRED HYPOTHYROIDISM: ICD-10-CM

## 2023-11-03 DIAGNOSIS — E55.9 VITAMIN D DEFICIENCY, UNSPECIFIED: ICD-10-CM

## 2023-11-03 DIAGNOSIS — Z00.00 ENCOUNTER FOR WELL ADULT EXAM WITHOUT ABNORMAL FINDINGS: Primary | ICD-10-CM

## 2023-11-03 RX ORDER — ROSUVASTATIN CALCIUM 5 MG/1
5 TABLET, COATED ORAL NIGHTLY
Qty: 90 TABLET | Refills: 3 | Status: SHIPPED | OUTPATIENT
Start: 2023-11-03

## 2023-11-03 RX ORDER — LEVOTHYROXINE SODIUM 0.05 MG/1
50 TABLET ORAL DAILY
Qty: 90 TABLET | Refills: 3 | Status: SHIPPED | OUTPATIENT
Start: 2023-11-03

## 2023-11-03 SDOH — ECONOMIC STABILITY: FOOD INSECURITY: WITHIN THE PAST 12 MONTHS, YOU WORRIED THAT YOUR FOOD WOULD RUN OUT BEFORE YOU GOT MONEY TO BUY MORE.: PATIENT DECLINED

## 2023-11-03 SDOH — ECONOMIC STABILITY: INCOME INSECURITY: HOW HARD IS IT FOR YOU TO PAY FOR THE VERY BASICS LIKE FOOD, HOUSING, MEDICAL CARE, AND HEATING?: PATIENT DECLINED

## 2023-11-03 SDOH — ECONOMIC STABILITY: FOOD INSECURITY: WITHIN THE PAST 12 MONTHS, THE FOOD YOU BOUGHT JUST DIDN'T LAST AND YOU DIDN'T HAVE MONEY TO GET MORE.: PATIENT DECLINED

## 2023-11-03 SDOH — ECONOMIC STABILITY: HOUSING INSECURITY
IN THE LAST 12 MONTHS, WAS THERE A TIME WHEN YOU DID NOT HAVE A STEADY PLACE TO SLEEP OR SLEPT IN A SHELTER (INCLUDING NOW)?: PATIENT REFUSED

## 2023-11-03 ASSESSMENT — LIFESTYLE VARIABLES
HOW MANY STANDARD DRINKS CONTAINING ALCOHOL DO YOU HAVE ON A TYPICAL DAY: PATIENT DOES NOT DRINK
HOW OFTEN DO YOU HAVE A DRINK CONTAINING ALCOHOL: NEVER

## 2023-11-03 ASSESSMENT — PATIENT HEALTH QUESTIONNAIRE - PHQ9
SUM OF ALL RESPONSES TO PHQ QUESTIONS 1-9: 0
SUM OF ALL RESPONSES TO PHQ QUESTIONS 1-9: 0
SUM OF ALL RESPONSES TO PHQ9 QUESTIONS 1 & 2: 0
SUM OF ALL RESPONSES TO PHQ QUESTIONS 1-9: 0
SUM OF ALL RESPONSES TO PHQ QUESTIONS 1-9: 0
2. FEELING DOWN, DEPRESSED OR HOPELESS: 0
1. LITTLE INTEREST OR PLEASURE IN DOING THINGS: 0

## 2023-11-03 NOTE — PATIENT INSTRUCTIONS
healthcare professional. [address] any warranty or liability for your use of this information. Personalized Preventive Plan for Eduardo Rodrigues - 11/3/2023  Medicare offers a range of preventive health benefits. Some of the tests and screenings are paid in full while other may be subject to a deductible, co-insurance, and/or copay. Some of these benefits include a comprehensive review of your medical history including lifestyle, illnesses that may run in your family, and various assessments and screenings as appropriate. After reviewing your medical record and screening and assessments performed today your provider may have ordered immunizations, labs, imaging, and/or referrals for you. A list of these orders (if applicable) as well as your Preventive Care list are included within your After Visit Summary for your review. Other Preventive Recommendations:    A preventive eye exam performed by an eye specialist is recommended every 1-2 years to screen for glaucoma; cataracts, macular degeneration, and other eye disorders. A preventive dental visit is recommended every 6 months. Try to get at least 150 minutes of exercise per week or 10,000 steps per day on a pedometer . Order or download the FREE \"Exercise & Physical Activity: Your Everyday Guide\" from [employer]. Call 2-995.171.9502 or search [employer] online. You need 0172-9207 mg of calcium and 8494-1167 IU of vitamin D per day. It is possible to meet your calcium requirement with diet alone, but a vitamin D supplement is usually necessary to meet this goal.  When exposed to the sun, use a sunscreen that protects against both UVA and UVB radiation with an SPF of 30 or greater. Reapply every 2 to 3 hours or after sweating, drying off with a towel, or swimming. Always wear a seat belt when traveling in a car. Always wear a helmet when riding a bicycle or motorcycle.

## 2023-11-03 NOTE — PROGRESS NOTES
Samina Soler is an 56 y.o. female who presents for wellness exam.     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 Followed by neuro     Hypothyroidism. She's been compliant with medication therapy. Dizziness, chronic problem. States this is exacerbated by large open spaces. Chronic feelings of depressed mood. States she does not want any therapy for this. Doing relatively well. Lives in a group home. States she is utd on pap smear per gyn. Allergies - reviewed: Allergies   Allergen Reactions    Penicillins      Other reaction(s): Unknown (comments)  Pt does not remember the reaction and she has taken amoxil without any problems     Sulfa Antibiotics Other (See Comments)     Not sure          Medications - reviewed:   Current Outpatient Medications   Medication Sig    levothyroxine (SYNTHROID) 50 MCG tablet Take 1 tablet by mouth Daily    rosuvastatin (CRESTOR) 5 MG tablet Take 1 tablet by mouth nightly    omeprazole (PRILOSEC) 20 MG delayed release capsule TAKE 1 CAPSULE BY MOUTH DAILY     No current facility-administered medications for this visit.          Past Medical History - reviewed:  Past Medical History:   Diagnosis Date    Cerebral hemorrhage at birth     COVID-19 2022    Diverticulosis     GERD (gastroesophageal reflux disease)     Hearing loss     Hydrocephalus, congenital (HCC)     Hypothyroidism 2011    Impacted cerumen     Leiomyoma of uterus, unspecified     Memory disorder     Per PT    Migraine     Mild intellectual disabilities     Myopia     OCD (obsessive compulsive disorder) 2012    Pneumothorax of      Routine gynecological examination     Dr. Golden Mcdaniel    Unspecified fetal growth retardation, 1,250-1,499 grams     Unspecified hearing loss     Vertigo     Vitamin D deficiency 2011

## 2023-11-04 LAB
ALBUMIN SERPL-MCNC: 4.1 G/DL (ref 3.5–5)
ALBUMIN/GLOB SERPL: 1.1 (ref 1.1–2.2)
ALP SERPL-CCNC: 120 U/L (ref 45–117)
ALT SERPL-CCNC: 26 U/L (ref 12–78)
ANION GAP SERPL CALC-SCNC: 5 MMOL/L (ref 5–15)
AST SERPL-CCNC: 4 U/L (ref 15–37)
BILIRUB SERPL-MCNC: 0.4 MG/DL (ref 0.2–1)
BUN SERPL-MCNC: 11 MG/DL (ref 6–20)
BUN/CREAT SERPL: 15 (ref 12–20)
CALCIUM SERPL-MCNC: 9.3 MG/DL (ref 8.5–10.1)
CHLORIDE SERPL-SCNC: 108 MMOL/L (ref 97–108)
CHOLEST SERPL-MCNC: 140 MG/DL
CO2 SERPL-SCNC: 29 MMOL/L (ref 21–32)
CREAT SERPL-MCNC: 0.71 MG/DL (ref 0.55–1.02)
GLOBULIN SER CALC-MCNC: 3.6 G/DL (ref 2–4)
GLUCOSE SERPL-MCNC: 88 MG/DL (ref 65–100)
HDLC SERPL-MCNC: 67 MG/DL
HDLC SERPL: 2.1 (ref 0–5)
LDLC SERPL CALC-MCNC: 62.2 MG/DL (ref 0–100)
POTASSIUM SERPL-SCNC: 4.5 MMOL/L (ref 3.5–5.1)
PROT SERPL-MCNC: 7.7 G/DL (ref 6.4–8.2)
SODIUM SERPL-SCNC: 142 MMOL/L (ref 136–145)
TRIGL SERPL-MCNC: 54 MG/DL
TSH SERPL DL<=0.05 MIU/L-ACNC: 4.7 UIU/ML (ref 0.36–3.74)
VLDLC SERPL CALC-MCNC: 10.8 MG/DL

## 2023-12-15 ENCOUNTER — APPOINTMENT (OUTPATIENT)
Facility: HOSPITAL | Age: 58
End: 2023-12-15
Payer: MEDICARE

## 2023-12-15 ENCOUNTER — HOSPITAL ENCOUNTER (EMERGENCY)
Facility: HOSPITAL | Age: 58
Discharge: HOME | End: 2023-12-15
Attending: STUDENT IN AN ORGANIZED HEALTH CARE EDUCATION/TRAINING PROGRAM
Payer: MEDICARE

## 2023-12-15 ENCOUNTER — APPOINTMENT (OUTPATIENT)
Facility: HOSPITAL | Age: 58
End: 2023-12-15
Attending: STUDENT IN AN ORGANIZED HEALTH CARE EDUCATION/TRAINING PROGRAM
Payer: MEDICARE

## 2023-12-15 VITALS
BODY MASS INDEX: 31.12 KG/M2 | OXYGEN SATURATION: 97 % | HEIGHT: 62 IN | WEIGHT: 169.09 LBS | HEART RATE: 96 BPM | TEMPERATURE: 97.6 F | RESPIRATION RATE: 18 BRPM | SYSTOLIC BLOOD PRESSURE: 143 MMHG | DIASTOLIC BLOOD PRESSURE: 88 MMHG

## 2023-12-15 DIAGNOSIS — R07.89 ATYPICAL CHEST PAIN: Primary | ICD-10-CM

## 2023-12-15 DIAGNOSIS — R06.02 SHORTNESS OF BREATH: ICD-10-CM

## 2023-12-15 LAB
ALBUMIN SERPL-MCNC: 3.7 G/DL (ref 3.5–5)
ALBUMIN/GLOB SERPL: 0.9 (ref 1.1–2.2)
ALP SERPL-CCNC: 112 U/L (ref 45–117)
ALT SERPL-CCNC: 20 U/L (ref 12–78)
ANION GAP SERPL CALC-SCNC: 6 MMOL/L (ref 5–15)
AST SERPL-CCNC: 4 U/L (ref 15–37)
BASOPHILS # BLD: 0.1 K/UL (ref 0–0.1)
BASOPHILS NFR BLD: 1 % (ref 0–1)
BILIRUB SERPL-MCNC: 0.2 MG/DL (ref 0.2–1)
BUN SERPL-MCNC: 14 MG/DL (ref 6–20)
BUN/CREAT SERPL: 13 (ref 12–20)
CALCIUM SERPL-MCNC: 9.1 MG/DL (ref 8.5–10.1)
CHLORIDE SERPL-SCNC: 113 MMOL/L (ref 97–108)
CO2 SERPL-SCNC: 24 MMOL/L (ref 21–32)
COMMENT:: NORMAL
CREAT SERPL-MCNC: 1.04 MG/DL (ref 0.55–1.02)
DIFFERENTIAL METHOD BLD: ABNORMAL
ECHO BSA: 1.83 M2
EKG ATRIAL RATE: 99 BPM
EKG DIAGNOSIS: NORMAL
EKG P AXIS: 32 DEGREES
EKG P-R INTERVAL: 122 MS
EKG Q-T INTERVAL: 334 MS
EKG QRS DURATION: 78 MS
EKG QTC CALCULATION (BAZETT): 428 MS
EKG R AXIS: 47 DEGREES
EKG T AXIS: 20 DEGREES
EKG VENTRICULAR RATE: 99 BPM
EOSINOPHIL # BLD: 0.3 K/UL (ref 0–0.4)
EOSINOPHIL NFR BLD: 5 % (ref 0–7)
ERYTHROCYTE [DISTWIDTH] IN BLOOD BY AUTOMATED COUNT: 11.3 % (ref 11.5–14.5)
ERYTHROCYTE [DISTWIDTH] IN BLOOD BY AUTOMATED COUNT: 11.5 % (ref 11.5–14.5)
FLUAV AG NPH QL IA: NEGATIVE
FLUBV AG NOSE QL IA: NEGATIVE
GLOBULIN SER CALC-MCNC: 4.3 G/DL (ref 2–4)
GLUCOSE SERPL-MCNC: 116 MG/DL (ref 65–100)
HCT VFR BLD AUTO: 39.6 % (ref 35–47)
HCT VFR BLD AUTO: 39.6 % (ref 35–47)
HGB BLD-MCNC: 12.7 G/DL (ref 11.5–16)
HGB BLD-MCNC: 12.7 G/DL (ref 11.5–16)
IMM GRANULOCYTES # BLD AUTO: 0 K/UL (ref 0–0.04)
IMM GRANULOCYTES NFR BLD AUTO: 0 % (ref 0–0.5)
LYMPHOCYTES # BLD: 1.2 K/UL (ref 0.8–3.5)
LYMPHOCYTES NFR BLD: 19 % (ref 12–49)
MCH RBC QN AUTO: 30.5 PG (ref 26–34)
MCH RBC QN AUTO: 30.5 PG (ref 26–34)
MCHC RBC AUTO-ENTMCNC: 32.1 G/DL (ref 30–36.5)
MCHC RBC AUTO-ENTMCNC: 32.1 G/DL (ref 30–36.5)
MCV RBC AUTO: 95.2 FL (ref 80–99)
MCV RBC AUTO: 95.2 FL (ref 80–99)
MONOCYTES # BLD: 0.3 K/UL (ref 0–1)
MONOCYTES NFR BLD: 5 % (ref 5–13)
NEUTS SEG # BLD: 4.5 K/UL (ref 1.8–8)
NEUTS SEG NFR BLD: 70 % (ref 32–75)
NRBC # BLD: 0 K/UL (ref 0–0.01)
NRBC # BLD: 0 K/UL (ref 0–0.01)
NRBC BLD-RTO: 0 PER 100 WBC
NRBC BLD-RTO: 0 PER 100 WBC
NT PRO BNP: 23 PG/ML
PLATELET # BLD AUTO: 317 K/UL (ref 150–400)
PLATELET # BLD AUTO: 318 K/UL (ref 150–400)
PMV BLD AUTO: 9.8 FL (ref 8.9–12.9)
PMV BLD AUTO: 9.9 FL (ref 8.9–12.9)
POTASSIUM SERPL-SCNC: 3.6 MMOL/L (ref 3.5–5.1)
PROT SERPL-MCNC: 8 G/DL (ref 6.4–8.2)
RBC # BLD AUTO: 4.16 M/UL (ref 3.8–5.2)
RBC # BLD AUTO: 4.16 M/UL (ref 3.8–5.2)
SARS-COV-2 RDRP RESP QL NAA+PROBE: NOT DETECTED
SODIUM SERPL-SCNC: 143 MMOL/L (ref 136–145)
SOURCE: NORMAL
SPECIMEN HOLD: NORMAL
TROPONIN I SERPL HS-MCNC: 4 NG/L (ref 0–51)
TROPONIN I SERPL HS-MCNC: 5 NG/L (ref 0–51)
WBC # BLD AUTO: 6.4 K/UL (ref 3.6–11)
WBC # BLD AUTO: 6.5 K/UL (ref 3.6–11)

## 2023-12-15 PROCEDURE — 87635 SARS-COV-2 COVID-19 AMP PRB: CPT

## 2023-12-15 PROCEDURE — 85025 COMPLETE CBC W/AUTO DIFF WBC: CPT

## 2023-12-15 PROCEDURE — 93005 ELECTROCARDIOGRAM TRACING: CPT | Performed by: EMERGENCY MEDICINE

## 2023-12-15 PROCEDURE — 85027 COMPLETE CBC AUTOMATED: CPT

## 2023-12-15 PROCEDURE — 71275 CT ANGIOGRAPHY CHEST: CPT

## 2023-12-15 PROCEDURE — 71045 X-RAY EXAM CHEST 1 VIEW: CPT

## 2023-12-15 PROCEDURE — 99285 EMERGENCY DEPT VISIT HI MDM: CPT

## 2023-12-15 PROCEDURE — 84484 ASSAY OF TROPONIN QUANT: CPT

## 2023-12-15 PROCEDURE — 36415 COLL VENOUS BLD VENIPUNCTURE: CPT

## 2023-12-15 PROCEDURE — 80053 COMPREHEN METABOLIC PANEL: CPT

## 2023-12-15 PROCEDURE — 83880 ASSAY OF NATRIURETIC PEPTIDE: CPT

## 2023-12-15 PROCEDURE — 93970 EXTREMITY STUDY: CPT

## 2023-12-15 PROCEDURE — 93005 ELECTROCARDIOGRAM TRACING: CPT | Performed by: STUDENT IN AN ORGANIZED HEALTH CARE EDUCATION/TRAINING PROGRAM

## 2023-12-15 PROCEDURE — 6360000004 HC RX CONTRAST MEDICATION: Performed by: EMERGENCY MEDICINE

## 2023-12-15 PROCEDURE — 87804 INFLUENZA ASSAY W/OPTIC: CPT

## 2023-12-15 RX ADMIN — IOPAMIDOL 75 ML: 755 INJECTION, SOLUTION INTRAVENOUS at 17:09

## 2023-12-15 ASSESSMENT — PAIN DESCRIPTION - FREQUENCY
FREQUENCY: INTERMITTENT
FREQUENCY: INTERMITTENT

## 2023-12-15 ASSESSMENT — PAIN DESCRIPTION - ONSET
ONSET: SUDDEN
ONSET: ON-GOING

## 2023-12-15 ASSESSMENT — PAIN DESCRIPTION - PAIN TYPE
TYPE: ACUTE PAIN
TYPE: ACUTE PAIN

## 2023-12-15 ASSESSMENT — PAIN - FUNCTIONAL ASSESSMENT
PAIN_FUNCTIONAL_ASSESSMENT: 0-10
PAIN_FUNCTIONAL_ASSESSMENT: ACTIVITIES ARE NOT PREVENTED
PAIN_FUNCTIONAL_ASSESSMENT: 0-10
PAIN_FUNCTIONAL_ASSESSMENT: PREVENTS OR INTERFERES SOME ACTIVE ACTIVITIES AND ADLS

## 2023-12-15 ASSESSMENT — PAIN DESCRIPTION - DESCRIPTORS
DESCRIPTORS: ACHING
DESCRIPTORS: DISCOMFORT

## 2023-12-15 ASSESSMENT — PAIN SCALES - GENERAL
PAINLEVEL_OUTOF10: 3
PAINLEVEL_OUTOF10: 4

## 2023-12-15 ASSESSMENT — PAIN DESCRIPTION - LOCATION
LOCATION: CHEST
LOCATION: CHEST

## 2023-12-15 ASSESSMENT — PAIN DESCRIPTION - ORIENTATION
ORIENTATION: MID
ORIENTATION: MID;ANTERIOR

## 2023-12-15 NOTE — DISCHARGE INSTRUCTIONS
Routine appointments for health maintenance with a primary care provider are very important and emergency department visits are no substitute. You should review all findings and test results from your visit today with your primary care physician. We recommended that you take medications as prescribed. You may take 1 or 2 pills of Tylenol every 6 hours as needed for pain or fever. You should not take this medication with other medications that contain acetaminophen/Tylenol which could include prescription pain medications or other combo over-the-counter medications. You should not exceed more than 4000 mg in a 24 hour. You may use 800 mg of ibuprofen every 6 hours as needed for pain or fever. You should NOT take this medication if you're taking other NSAID/anti-inflammatory medications or on steroids or other blood thinning medications. Return to the emergency department for any new or concerning signs/symptoms or failure to improve.

## 2023-12-15 NOTE — ED TRIAGE NOTES
Patient is coming in via EMS for chest pain and SOB from [address]. Patient states symptoms started last night.

## 2023-12-16 NOTE — ED NOTES
I have reviewed discharge instructions with the patient. Opportunity for questions & clarifications was provided. All questions & concerns were addressed. The patient verbalized understanding. She left via wheelchair / [facility] non-emergency transport in stable condition, no acute distress noted.       Sylvia Rivers RN  12/15/23 [id number]

## 2023-12-16 NOTE — ED NOTES
Bedside shift change report given to [address] [place] [address] (oncoming nurse) by Sekou Lanier RN (offgoing nurse). Report included the following information ED Encounter Summary, ED SBAR, MAR, and Recent Results.       Rose Renae RN  12/15/23 2003

## 2024-01-25 ENCOUNTER — OFFICE VISIT (OUTPATIENT)
Dept: PRIMARY CARE CLINIC | Facility: CLINIC | Age: 59
End: 2024-01-25
Payer: MEDICARE

## 2024-01-25 VITALS
HEART RATE: 97 BPM | WEIGHT: 173 LBS | SYSTOLIC BLOOD PRESSURE: 134 MMHG | HEIGHT: 62 IN | RESPIRATION RATE: 12 BRPM | OXYGEN SATURATION: 98 % | BODY MASS INDEX: 31.83 KG/M2 | DIASTOLIC BLOOD PRESSURE: 80 MMHG | TEMPERATURE: 97.3 F

## 2024-01-25 DIAGNOSIS — M79.642 PAIN IN BOTH HANDS: Primary | ICD-10-CM

## 2024-01-25 DIAGNOSIS — M79.641 PAIN IN BOTH HANDS: Primary | ICD-10-CM

## 2024-01-25 DIAGNOSIS — K21.9 GASTROESOPHAGEAL REFLUX DISEASE WITHOUT ESOPHAGITIS: ICD-10-CM

## 2024-01-25 DIAGNOSIS — G91.0 COMMUNICATING HYDROCEPHALUS (HCC): ICD-10-CM

## 2024-01-25 DIAGNOSIS — E03.9 ACQUIRED HYPOTHYROIDISM: ICD-10-CM

## 2024-01-25 PROCEDURE — G8417 CALC BMI ABV UP PARAM F/U: HCPCS | Performed by: FAMILY MEDICINE

## 2024-01-25 PROCEDURE — 99214 OFFICE O/P EST MOD 30 MIN: CPT | Performed by: FAMILY MEDICINE

## 2024-01-25 PROCEDURE — 1036F TOBACCO NON-USER: CPT | Performed by: FAMILY MEDICINE

## 2024-01-25 PROCEDURE — G8482 FLU IMMUNIZE ORDER/ADMIN: HCPCS | Performed by: FAMILY MEDICINE

## 2024-01-25 PROCEDURE — G8427 DOCREV CUR MEDS BY ELIG CLIN: HCPCS | Performed by: FAMILY MEDICINE

## 2024-01-25 PROCEDURE — 3017F COLORECTAL CA SCREEN DOC REV: CPT | Performed by: FAMILY MEDICINE

## 2024-01-25 RX ORDER — FAMOTIDINE 20 MG/1
20 TABLET, FILM COATED ORAL DAILY
Qty: 30 TABLET | Refills: 5 | Status: SHIPPED | OUTPATIENT
Start: 2024-01-25

## 2024-01-25 ASSESSMENT — PATIENT HEALTH QUESTIONNAIRE - PHQ9
SUM OF ALL RESPONSES TO PHQ9 QUESTIONS 1 & 2: 0
1. LITTLE INTEREST OR PLEASURE IN DOING THINGS: 0
SUM OF ALL RESPONSES TO PHQ QUESTIONS 1-9: 0
2. FEELING DOWN, DEPRESSED OR HOPELESS: 0

## 2024-01-25 NOTE — PROGRESS NOTES
Subjective  Dilcia Benson is an 58 y.o. female who presents for arm pains.     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 Congenital hydrocephaus with  shunt, ASD.      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 Followed by neuro     Hypothyroidism. The is taking a thyroid medication daily, but unsure which dose she's taking.      Lives in a group home.     ER visit 12/15 due to CP. Labs unremarkable. CTA neg.   Today c/o bilat arm pain, intermittent for 1 year. Aching pain. This is mostly continuous. Waxes and wanes.  Bothers her when she lies down at night.  Improves with moving her arms.     Acid reflux. She's stopped omeprazole and is having intermittent symptoms. She is not interested in diet changes.     Allergies - reviewed:   Allergies   Allergen Reactions    Penicillins      Other reaction(s): Unknown (comments)  Pt does not remember the reaction and she has taken amoxil without any problems     Sulfa Antibiotics Other (See Comments)     Not sure          Medications - reviewed:   Current Outpatient Medications   Medication Sig    levothyroxine (SYNTHROID) 75 MCG tablet Take 1 tablet by mouth Daily Alternate 50mcg and 75mcg dose daily. 50mcg on mon/wed/fri, 75mcg on sat/sun/tue/thu    rosuvastatin (CRESTOR) 5 MG tablet Take 1 tablet by mouth nightly    omeprazole (PRILOSEC) 20 MG delayed release capsule TAKE 1 CAPSULE BY MOUTH DAILY     No current facility-administered medications for this visit.           ROS  Patient is a poor historian.   Denies recent illness.  Denies chest pain or dyspnea.           Physical Exam  /80 (Site: Right Upper Arm, Position: Sitting, Cuff Size: Medium Adult)   Pulse 97   Temp 97.3 °F (36.3 °C) (Temporal)   Resp 12   Ht 1.575 m (5' 2\")   Wt 78.5 kg (173 lb)   SpO2 98%   BMI 31.64 kg/m²   Physical Exam  Vitals reviewed.   Constitutional:       Appearance: Normal appearance.   Cardiovascular:      Rate

## 2024-01-25 NOTE — PROGRESS NOTES
\"Have you been to the ER, urgent care clinic since your last visit?  Hospitalized since your last visit?\"    NO    “Have you seen or consulted any other health care providers outside of [facility] since your last visit?”    NO        “Have you had a pap smear?”    [facility]

## 2024-01-26 LAB — TSH SERPL DL<=0.05 MIU/L-ACNC: 1.47 UIU/ML (ref 0.36–3.74)

## 2024-01-29 ENCOUNTER — TELEPHONE (OUTPATIENT)
Dept: PRIMARY CARE CLINIC | Facility: CLINIC | Age: 59
End: 2024-01-29

## 2024-01-29 RX ORDER — LEVOTHYROXINE SODIUM 0.07 MG/1
75 TABLET ORAL EVERY OTHER DAY
Qty: 45 TABLET | Refills: 1 | Status: SHIPPED | OUTPATIENT
Start: 2024-01-29

## 2024-01-29 NOTE — TELEPHONE ENCOUNTER
Patient care giver Jasen calling to let Dr. Tucker know that the patient is currently only taking the Levothyroxine 75mcg every other day that patient doesn't have the 50mcg because she threw it away. Please call Jasen with how patient should be taking  medication.

## 2024-01-29 NOTE — TELEPHONE ENCOUNTER
----- Message from Vania Tucker DO sent at 1/26/2024  3:11 PM EST -----  Hi, can you please inform Dilcia her recent thyroid test results are in normal range. Please continue current levothyroxine dose. Can you please clarify which dose she is taking so we can correct this in her chart? Thank you!

## 2024-02-08 ENCOUNTER — OFFICE VISIT (OUTPATIENT)
Dept: PRIMARY CARE CLINIC | Facility: CLINIC | Age: 59
End: 2024-02-08
Payer: MEDICARE

## 2024-02-08 VITALS
BODY MASS INDEX: 30.18 KG/M2 | WEIGHT: 164 LBS | SYSTOLIC BLOOD PRESSURE: 133 MMHG | HEART RATE: 85 BPM | TEMPERATURE: 97.2 F | OXYGEN SATURATION: 94 % | DIASTOLIC BLOOD PRESSURE: 85 MMHG | HEIGHT: 62 IN | RESPIRATION RATE: 12 BRPM

## 2024-02-08 DIAGNOSIS — J02.0 STREP THROAT: Primary | ICD-10-CM

## 2024-02-08 PROCEDURE — 99213 OFFICE O/P EST LOW 20 MIN: CPT | Performed by: FAMILY MEDICINE

## 2024-02-08 PROCEDURE — G8482 FLU IMMUNIZE ORDER/ADMIN: HCPCS | Performed by: FAMILY MEDICINE

## 2024-02-08 PROCEDURE — G8427 DOCREV CUR MEDS BY ELIG CLIN: HCPCS | Performed by: FAMILY MEDICINE

## 2024-02-08 PROCEDURE — 1036F TOBACCO NON-USER: CPT | Performed by: FAMILY MEDICINE

## 2024-02-08 PROCEDURE — G8417 CALC BMI ABV UP PARAM F/U: HCPCS | Performed by: FAMILY MEDICINE

## 2024-02-08 PROCEDURE — 3017F COLORECTAL CA SCREEN DOC REV: CPT | Performed by: FAMILY MEDICINE

## 2024-02-08 RX ORDER — AZITHROMYCIN 250 MG/1
250 TABLET, FILM COATED ORAL SEE ADMIN INSTRUCTIONS
Qty: 6 TABLET | Refills: 0 | Status: SHIPPED | OUTPATIENT
Start: 2024-02-08 | End: 2024-02-13

## 2024-02-08 NOTE — PROGRESS NOTES
\"Have you been to the ER, urgent care clinic since your last visit?  Hospitalized since your last visit?\"    NO    “Have you seen or consulted any other health care providers outside of [facility] since your last visit?”    NO        “Have you had a pap smear?”    NO

## 2024-02-08 NOTE — PROGRESS NOTES
Subjective  Dilcia Benson is an 58 y.o. female who presents for uri.      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 Congenital hydrocephaus with  shunt, ASD.      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 Followed by neuro     Pt is a poor historian.   C/o congestion, sore throat, malaise x 5 days.  No chest pain or dyspnea.      Allergies - reviewed:   Allergies   Allergen Reactions    Penicillins      Other reaction(s): Unknown (comments)  Pt does not remember the reaction and she has taken amoxil without any problems     Sulfa Antibiotics Other (See Comments)     Not sure          Medications - reviewed:   Current Outpatient Medications   Medication Sig    levothyroxine (SYNTHROID) 75 MCG tablet Take 1 tablet by mouth every other day Alternate 50mcg and 75mcg dose daily. 50mcg on mon/wed/fri, 75mcg on sat/sun/tue/u    rosuvastatin (CRESTOR) 5 MG tablet Take 1 tablet by mouth nightly     No current facility-administered medications for this visit.         Past Medical History - reviewed:  Past Medical History:   Diagnosis Date    Cerebral hemorrhage at birth     COVID-19 2022    Diverticulosis     GERD (gastroesophageal reflux disease)     Hearing loss     Hydrocephalus, congenital (HCC)     Hypothyroidism 2011    Impacted cerumen     Leiomyoma of uterus, unspecified     Memory disorder     Per PT    Migraine     Mild intellectual disabilities     Myopia     OCD (obsessive compulsive disorder) 2012    Pneumothorax of      Routine gynecological examination     Dr. Sood    Unspecified fetal growth retardation, 1,250-1,499 grams     Unspecified hearing loss     Vertigo     Vitamin D deficiency 2011     (ventriculoperitoneal) shunt status            ROS  No other acute complaints.       Physical Exam  /85 (Site: Left Upper Arm, Position: Sitting, Cuff Size: Medium Adult)   Pulse 85   Temp 97.2 °F (36.2 °C)

## 2024-04-23 ENCOUNTER — OFFICE VISIT (OUTPATIENT)
Dept: PRIMARY CARE CLINIC | Facility: CLINIC | Age: 59
End: 2024-04-23
Payer: MEDICARE

## 2024-04-23 VITALS
BODY MASS INDEX: 28.78 KG/M2 | HEIGHT: 62 IN | SYSTOLIC BLOOD PRESSURE: 132 MMHG | TEMPERATURE: 97.9 F | WEIGHT: 156.4 LBS | OXYGEN SATURATION: 96 % | HEART RATE: 88 BPM | RESPIRATION RATE: 12 BRPM | DIASTOLIC BLOOD PRESSURE: 73 MMHG

## 2024-04-23 DIAGNOSIS — K21.9 GASTROESOPHAGEAL REFLUX DISEASE WITHOUT ESOPHAGITIS: Primary | ICD-10-CM

## 2024-04-23 PROCEDURE — G8427 DOCREV CUR MEDS BY ELIG CLIN: HCPCS | Performed by: FAMILY MEDICINE

## 2024-04-23 PROCEDURE — 3017F COLORECTAL CA SCREEN DOC REV: CPT | Performed by: FAMILY MEDICINE

## 2024-04-23 PROCEDURE — 99213 OFFICE O/P EST LOW 20 MIN: CPT | Performed by: FAMILY MEDICINE

## 2024-04-23 PROCEDURE — G8417 CALC BMI ABV UP PARAM F/U: HCPCS | Performed by: FAMILY MEDICINE

## 2024-04-23 PROCEDURE — 1036F TOBACCO NON-USER: CPT | Performed by: FAMILY MEDICINE

## 2024-04-23 RX ORDER — OMEPRAZOLE 20 MG/1
20 TABLET, DELAYED RELEASE ORAL DAILY
Qty: 30 TABLET | Refills: 3 | Status: SHIPPED | OUTPATIENT
Start: 2024-04-23

## 2024-04-23 RX ORDER — FAMOTIDINE 20 MG/1
20 TABLET, FILM COATED ORAL DAILY
COMMUNITY
Start: 2024-02-10

## 2024-04-23 SDOH — ECONOMIC STABILITY: FOOD INSECURITY: WITHIN THE PAST 12 MONTHS, YOU WORRIED THAT YOUR FOOD WOULD RUN OUT BEFORE YOU GOT MONEY TO BUY MORE.: NEVER TRUE

## 2024-04-23 SDOH — ECONOMIC STABILITY: HOUSING INSECURITY
IN THE LAST 12 MONTHS, WAS THERE A TIME WHEN YOU DID NOT HAVE A STEADY PLACE TO SLEEP OR SLEPT IN A SHELTER (INCLUDING NOW)?: NO

## 2024-04-23 SDOH — ECONOMIC STABILITY: INCOME INSECURITY: HOW HARD IS IT FOR YOU TO PAY FOR THE VERY BASICS LIKE FOOD, HOUSING, MEDICAL CARE, AND HEATING?: NOT HARD AT ALL

## 2024-04-23 SDOH — ECONOMIC STABILITY: FOOD INSECURITY: WITHIN THE PAST 12 MONTHS, THE FOOD YOU BOUGHT JUST DIDN'T LAST AND YOU DIDN'T HAVE MONEY TO GET MORE.: NEVER TRUE

## 2024-04-23 NOTE — PROGRESS NOTES
prilosSubjective  Dilcia Benson is an 58 y.o. female who presents for follow up.     She c/o acid reflux that occurs when she eats spicy foods.  She enjoys spicy foods and is requesting rx to take when she has this problem.      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 Congenital hydrocephaus with  shunt, ASD.      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 Followed by neuro     Hypothyroidism.     Allergies - reviewed:   Allergies   Allergen Reactions    Penicillins      Other reaction(s): Unknown (comments)  Pt does not remember the reaction and she has taken amoxil without any problems     Sulfa Antibiotics Other (See Comments)     Not sure          Medications - reviewed:   Current Outpatient Medications   Medication Sig    famotidine (PEPCID) 20 MG tablet Take 1 tablet by mouth daily    levothyroxine (SYNTHROID) 75 MCG tablet Take 1 tablet by mouth every other day Alternate 50mcg and 75mcg dose daily. 50mcg on mon/wed/fri, 75mcg on sat/sun/tue/thu (Patient taking differently: Take 1 tablet by mouth every other day Pt states that she takes on 75MCG every other day)    rosuvastatin (CRESTOR) 5 MG tablet Take 1 tablet by mouth nightly     No current facility-administered medications for this visit.     ROS  No chest pain.   No change in BM.  No signs of bleeding.  No other acute complaints.       Physical Exam  /73 (Site: Left Upper Arm, Position: Sitting, Cuff Size: Medium Adult)   Pulse 88   Temp 97.9 °F (36.6 °C) (Temporal)   Resp 12   Ht 1.575 m (5' 2\")   Wt 70.9 kg (156 lb 6.4 oz)   SpO2 96%   BMI 28.61 kg/m²   Physical Exam  Vitals reviewed.   Constitutional:       General: She is not in acute distress.     Appearance: She is not ill-appearing or toxic-appearing.   Cardiovascular:      Rate and Rhythm: Normal rate and regular rhythm.      Heart sounds: Normal heart sounds.   Pulmonary:      Effort: Pulmonary effort is normal.

## 2024-04-23 NOTE — PROGRESS NOTES
\"Have you been to the ER, urgent care clinic since your last visit?  Hospitalized since your last visit?\"    NO    “Have you seen or consulted any other health care providers outside of [facility] since your last visit?”    NO     “Have you had a [facility] smear?”    [facility]     Date of last Cervical Cancer screen (HPV or PAP): 8/23/2017             Click Here for Release of Records Request

## 2024-07-08 ENCOUNTER — TELEPHONE (OUTPATIENT)
Dept: PRIMARY CARE CLINIC | Facility: CLINIC | Age: 59
End: 2024-07-08

## 2024-07-08 NOTE — TELEPHONE ENCOUNTER
----- Message from Tosin Brown sent at 7/5/2024  2:50 PM EDT -----  Regarding: ECC Escalation To Practice  ECC Escalation To Practice      Type of Escalation: Acute Care Symptom  --------------------------------------------------------------------------------------------------------------------------    Information for Provider:  Patient is looking for appointment for: Symptom: cough and headache  Reasons for Message: Unable to reach practice     Additional Information ...pt. Having a cough over a month and feeling headache a lot --------------------------------------------------------------------------------------------------------------------------    Relationship to Patient: Self     Call Back Info: OK to leave message on voicemail  Preferred Call Back Number: Phone [phone]

## 2024-07-18 ENCOUNTER — HOSPITAL ENCOUNTER (OUTPATIENT)
Facility: HOSPITAL | Age: 59
Discharge: HOME | End: 2024-07-18
Attending: FAMILY MEDICINE
Payer: MEDICARE

## 2024-07-18 ENCOUNTER — OFFICE VISIT (OUTPATIENT)
Dept: PRIMARY CARE CLINIC | Facility: CLINIC | Age: 59
End: 2024-07-18

## 2024-07-18 VITALS
SYSTOLIC BLOOD PRESSURE: 115 MMHG | TEMPERATURE: 97.4 F | BODY MASS INDEX: 28.61 KG/M2 | DIASTOLIC BLOOD PRESSURE: 78 MMHG | RESPIRATION RATE: 15 BRPM | OXYGEN SATURATION: 99 % | HEIGHT: 62 IN | HEART RATE: 95 BPM

## 2024-07-18 DIAGNOSIS — R05.3 PERSISTENT COUGH: Primary | ICD-10-CM

## 2024-07-18 DIAGNOSIS — R05.3 PERSISTENT COUGH: ICD-10-CM

## 2024-07-18 PROCEDURE — 71046 X-RAY EXAM CHEST 2 VIEWS: CPT

## 2024-07-18 RX ORDER — FLUTICASONE PROPIONATE 50 MCG
1 SPRAY, SUSPENSION (ML) NASAL DAILY
Qty: 32 G | Refills: 1 | Status: SHIPPED | OUTPATIENT
Start: 2024-07-18

## 2024-07-18 SDOH — ECONOMIC STABILITY: FOOD INSECURITY: WITHIN THE PAST 12 MONTHS, THE FOOD YOU BOUGHT JUST DIDN'T LAST AND YOU DIDN'T HAVE MONEY TO GET MORE.: NEVER TRUE

## 2024-07-18 SDOH — ECONOMIC STABILITY: FOOD INSECURITY: WITHIN THE PAST 12 MONTHS, YOU WORRIED THAT YOUR FOOD WOULD RUN OUT BEFORE YOU GOT MONEY TO BUY MORE.: NEVER TRUE

## 2024-07-18 SDOH — ECONOMIC STABILITY: INCOME INSECURITY: HOW HARD IS IT FOR YOU TO PAY FOR THE VERY BASICS LIKE FOOD, HOUSING, MEDICAL CARE, AND HEATING?: NOT HARD AT ALL

## 2024-07-18 ASSESSMENT — PATIENT HEALTH QUESTIONNAIRE - PHQ9
2. FEELING DOWN, DEPRESSED OR HOPELESS: NEARLY EVERY DAY
1. LITTLE INTEREST OR PLEASURE IN DOING THINGS: NEARLY EVERY DAY
SUM OF ALL RESPONSES TO PHQ QUESTIONS 1-9: 6
9. THOUGHTS THAT YOU WOULD BE BETTER OFF DEAD, OR OF HURTING YOURSELF: NOT AT ALL
5. POOR APPETITE OR OVEREATING: NOT AT ALL
8. MOVING OR SPEAKING SO SLOWLY THAT OTHER PEOPLE COULD HAVE NOTICED. OR THE OPPOSITE, BEING SO FIGETY OR RESTLESS THAT YOU HAVE BEEN MOVING AROUND A LOT MORE THAN USUAL: NOT AT ALL
6. FEELING BAD ABOUT YOURSELF - OR THAT YOU ARE A FAILURE OR HAVE LET YOURSELF OR YOUR FAMILY DOWN: NOT AT ALL
4. FEELING TIRED OR HAVING LITTLE ENERGY: NOT AT ALL
SUM OF ALL RESPONSES TO PHQ9 QUESTIONS 1 & 2: 6
3. TROUBLE FALLING OR STAYING ASLEEP: NOT AT ALL
SUM OF ALL RESPONSES TO PHQ QUESTIONS 1-9: 6
SUM OF ALL RESPONSES TO PHQ QUESTIONS 1-9: 6
7. TROUBLE CONCENTRATING ON THINGS, SUCH AS READING THE NEWSPAPER OR WATCHING TELEVISION: NOT AT ALL
SUM OF ALL RESPONSES TO PHQ QUESTIONS 1-9: 6
10. IF YOU CHECKED OFF ANY PROBLEMS, HOW DIFFICULT HAVE THESE PROBLEMS MADE IT FOR YOU TO DO YOUR WORK, TAKE CARE OF THINGS AT HOME, OR GET ALONG WITH OTHER PEOPLE: NOT DIFFICULT AT ALL

## 2024-07-18 NOTE — PROGRESS NOTES
Subjective  Dilcia Benson is an 58 y.o. female who presents for cough.     C/o cough for the past 2 months. Nonproductive.   No fever or weight change.   She does not feel acid reflux symptoms.       Allergies - reviewed:   Allergies   Allergen Reactions    Penicillins      Other reaction(s): Unknown (comments)  Pt does not remember the reaction and she has taken amoxil without any problems     Sulfa Antibiotics Other (See Comments)     Not sure          Medications - reviewed:   Current Outpatient Medications   Medication Sig    fluticasone (FLONASE) 50 MCG/ACT nasal spray 1 spray by Each Nostril route daily    famotidine (PEPCID) 20 MG tablet Take 1 tablet by mouth daily    levothyroxine (SYNTHROID) 75 MCG tablet Take 1 tablet by mouth every other day Alternate 50mcg and 75mcg dose daily. 50mcg on mon/wed/fri, 75mcg on sat/sun/tue/thu (Patient taking differently: Take 1 tablet by mouth every other day Pt states that she takes on 75MCG every other day)    rosuvastatin (CRESTOR) 5 MG tablet Take 1 tablet by mouth nightly    omeprazole (PRILOSEC OTC) 20 MG tablet Take 1 tablet by mouth daily (Patient not taking: Reported on 7/18/2024)     No current facility-administered medications for this visit.         ROS  CONSTITUTIONAL: Denies fever.  CARDIOVASCULAR: Denies chest pain.  RESPIRATORY: Denies dyspnea.        Physical Exam  /78 (Site: Left Upper Arm, Position: Sitting)   Pulse 95   Temp 97.4 °F (36.3 °C) (Temporal)   Resp 15   Ht 1.575 m (5' 2\")   SpO2 99%   BMI 28.61 kg/m²   Physical Exam  Vitals reviewed.   Constitutional:       Appearance: Normal appearance.   HENT:      Head: Normocephalic and atraumatic.      Mouth/Throat:      Mouth: Mucous membranes are moist.      Pharynx: Oropharynx is clear.   Cardiovascular:      Rate and Rhythm: Normal rate and regular rhythm.      Heart sounds: Normal heart sounds.   Pulmonary:      Effort: Pulmonary effort is normal.      Breath sounds: Normal breath

## 2024-07-18 NOTE — PROGRESS NOTES
Health Decision Maker has been checked with the patient          Chief Complaint   Patient presents with    Other     Pt wants to make sure lungs are okay because of an accident with her microwave.       \"Have you been to the ER, urgent care clinic since your last visit?  Hospitalized since your last visit?\"    NO    “Have you seen or consulted any other health care providers outside of [facility] since your last visit?”    NO      There were no vitals filed for this visit.       “Have you had a pap smear?”    YES - Where: [facility], PT DOESN'T REMEMBER WHEN    Date of last Cervical Cancer screen (HPV or PAP): 8/23/2017             Click Here for Release of Records Request

## 2024-08-08 NOTE — TELEPHONE ENCOUNTER
Patient called to get the results from her xray and also to find out about her refill for Levothyroxine 75mcg.  Please call patient back at Ph#[phone].

## 2024-08-08 NOTE — TELEPHONE ENCOUNTER
PCP: Vania Tucker DO    Last Visit 7/18/2024   No future appointments.    Requested Prescriptions     Pending Prescriptions Disp Refills    levothyroxine (SYNTHROID) 75 MCG tablet 45 tablet 1     Sig: Take 1 tablet by mouth every other day Alternate 50mcg and 75mcg dose daily. 50mcg on mon/wed/fri, 75mcg on sat/sun/tue/thu         Other Comments: Last Refill   01/29/24

## 2024-08-09 ENCOUNTER — TELEPHONE (OUTPATIENT)
Dept: PRIMARY CARE CLINIC | Facility: CLINIC | Age: 59
End: 2024-08-09

## 2024-08-09 RX ORDER — LEVOTHYROXINE SODIUM 0.07 MG/1
75 TABLET ORAL EVERY OTHER DAY
Qty: 45 TABLET | Refills: 1 | Status: SHIPPED | OUTPATIENT
Start: 2024-08-09

## 2024-09-16 ENCOUNTER — TELEPHONE (OUTPATIENT)
Dept: PRIMARY CARE CLINIC | Facility: CLINIC | Age: 59
End: 2024-09-16

## 2024-09-16 NOTE — TELEPHONE ENCOUNTER
----- Message from [employer] KORI sent at 9/16/2024 10:51 AM EDT -----  Regarding: ECC Appointment Request  ECC Appointment Request    Patient needs appointment for ECC Appointment Type: New to Provider.    Patient Requested Dates(s): soonest available   Patient Requested Time: anytime   Provider Name: Vania Tucker     Reason for Appointment Request: Other     PT pcp is leaving. Pt want to establish care with DR. Denisa Sutherland    --------------------------------------------------------------------------------------------------------------------------    Relationship to Patient: Self     Call Back Information: OK to leave message on [employer]  Preferred Call Back Number: Phone [phone]

## 2024-09-17 ENCOUNTER — TELEPHONE (OUTPATIENT)
Dept: PRIMARY CARE CLINIC | Facility: CLINIC | Age: 59
End: 2024-09-17

## 2024-09-17 NOTE — TELEPHONE ENCOUNTER
----- Message from [employer] KORI sent at 9/16/2024 10:51 AM EDT -----  Regarding: ECC Appointment Request  ECC Appointment Request    Patient needs appointment for ECC Appointment Type: New to Provider.    Patient Requested Dates(s): soonest available   Patient Requested Time: anytime   Provider Name: Vania Tucker     Reason for Appointment Request: Other     PT pcp is leaving. Pt want to establish care with DR. Denisa Sutherland    --------------------------------------------------------------------------------------------------------------------------    Relationship to Patient: Self     Call Back Information: OK to leave message on [employer]  Preferred Call Back Number: Phone [phone]

## 2024-11-25 RX ORDER — ROSUVASTATIN CALCIUM 5 MG/1
5 TABLET, COATED ORAL NIGHTLY
Qty: 90 TABLET | Refills: 0 | Status: SHIPPED | OUTPATIENT
Start: 2024-11-25

## 2024-12-06 DIAGNOSIS — E03.9 ACQUIRED HYPOTHYROIDISM: ICD-10-CM

## 2024-12-06 DIAGNOSIS — E78.2 MIXED HYPERLIPIDEMIA: Primary | ICD-10-CM

## 2024-12-06 RX ORDER — ROSUVASTATIN CALCIUM 5 MG/1
5 TABLET, COATED ORAL NIGHTLY
Qty: 90 TABLET | Refills: 0 | Status: SHIPPED | OUTPATIENT
Start: 2024-12-06

## 2024-12-06 NOTE — TELEPHONE ENCOUNTER
1 refill given. Overdue for annual exam and labs. Fasting labs ordered to be done at earliest convenience.

## 2025-01-01 ENCOUNTER — HOSPITAL ENCOUNTER (EMERGENCY)
Facility: HOSPITAL | Age: 60
Discharge: HOME | End: 2025-01-01
Attending: STUDENT IN AN ORGANIZED HEALTH CARE EDUCATION/TRAINING PROGRAM
Payer: MEDICARE

## 2025-01-01 ENCOUNTER — APPOINTMENT (OUTPATIENT)
Facility: HOSPITAL | Age: 60
End: 2025-01-01
Payer: MEDICARE

## 2025-01-01 VITALS
HEART RATE: 89 BPM | RESPIRATION RATE: 18 BRPM | DIASTOLIC BLOOD PRESSURE: 76 MMHG | TEMPERATURE: 99.2 F | OXYGEN SATURATION: 95 % | SYSTOLIC BLOOD PRESSURE: 116 MMHG

## 2025-01-01 DIAGNOSIS — J06.9 UPPER RESPIRATORY TRACT INFECTION, UNSPECIFIED TYPE: Primary | ICD-10-CM

## 2025-01-01 LAB
ANION GAP SERPL CALC-SCNC: 12 MMOL/L (ref 2–12)
BASOPHILS # BLD: 0 K/UL (ref 0–0.1)
BASOPHILS NFR BLD: 0 % (ref 0–1)
BUN SERPL-MCNC: 10 MG/DL (ref 6–20)
BUN/CREAT SERPL: 13 (ref 12–20)
CALCIUM SERPL-MCNC: 9.4 MG/DL (ref 8.5–10.1)
CHLORIDE SERPL-SCNC: 102 MMOL/L (ref 97–108)
CO2 SERPL-SCNC: 26 MMOL/L (ref 21–32)
CREAT SERPL-MCNC: 0.75 MG/DL (ref 0.55–1.02)
DIFFERENTIAL METHOD BLD: NORMAL
EOSINOPHIL # BLD: 0.2 K/UL (ref 0–0.4)
EOSINOPHIL NFR BLD: 4 % (ref 0–7)
ERYTHROCYTE [DISTWIDTH] IN BLOOD BY AUTOMATED COUNT: 11.9 % (ref 11.5–14.5)
FLUAV RNA SPEC QL NAA+PROBE: NOT DETECTED
FLUBV RNA SPEC QL NAA+PROBE: NOT DETECTED
GLUCOSE SERPL-MCNC: 90 MG/DL (ref 65–100)
HCT VFR BLD AUTO: 38.2 % (ref 35–47)
HGB BLD-MCNC: 12.4 G/DL (ref 11.5–16)
IMM GRANULOCYTES # BLD AUTO: 0 K/UL (ref 0–0.04)
IMM GRANULOCYTES NFR BLD AUTO: 0 % (ref 0–0.5)
LYMPHOCYTES # BLD: 0.9 K/UL (ref 0.8–3.5)
LYMPHOCYTES NFR BLD: 17 % (ref 12–49)
MCH RBC QN AUTO: 30.2 PG (ref 26–34)
MCHC RBC AUTO-ENTMCNC: 32.5 G/DL (ref 30–36.5)
MCV RBC AUTO: 92.9 FL (ref 80–99)
MONOCYTES # BLD: 0.5 K/UL (ref 0–1)
MONOCYTES NFR BLD: 10 % (ref 5–13)
NEUTS SEG # BLD: 3.6 K/UL (ref 1.8–8)
NEUTS SEG NFR BLD: 69 % (ref 32–75)
NRBC # BLD: 0 K/UL (ref 0–0.01)
NRBC BLD-RTO: 0 PER 100 WBC
PLATELET # BLD AUTO: 244 K/UL (ref 150–400)
PMV BLD AUTO: 10.2 FL (ref 8.9–12.9)
POTASSIUM SERPL-SCNC: 3.9 MMOL/L (ref 3.5–5.1)
RBC # BLD AUTO: 4.11 M/UL (ref 3.8–5.2)
SARS-COV-2 RNA RESP QL NAA+PROBE: NOT DETECTED
SODIUM SERPL-SCNC: 140 MMOL/L (ref 136–145)
SOURCE: NORMAL
TROPONIN I SERPL HS-MCNC: <4 NG/L (ref 0–51)
WBC # BLD AUTO: 5.3 K/UL (ref 3.6–11)

## 2025-01-01 PROCEDURE — 6370000000 HC RX 637 (ALT 250 FOR IP): Performed by: STUDENT IN AN ORGANIZED HEALTH CARE EDUCATION/TRAINING PROGRAM

## 2025-01-01 PROCEDURE — 84484 ASSAY OF TROPONIN QUANT: CPT

## 2025-01-01 PROCEDURE — 71045 X-RAY EXAM CHEST 1 VIEW: CPT

## 2025-01-01 PROCEDURE — 87636 SARSCOV2 & INF A&B AMP PRB: CPT

## 2025-01-01 PROCEDURE — 36415 COLL VENOUS BLD VENIPUNCTURE: CPT

## 2025-01-01 PROCEDURE — 85025 COMPLETE CBC W/AUTO DIFF WBC: CPT

## 2025-01-01 PROCEDURE — 80048 BASIC METABOLIC PNL TOTAL CA: CPT

## 2025-01-01 PROCEDURE — 99285 EMERGENCY DEPT VISIT HI MDM: CPT

## 2025-01-01 RX ORDER — BENZONATATE 100 MG/1
100 CAPSULE ORAL ONCE
Status: COMPLETED | OUTPATIENT
Start: 2025-01-01 | End: 2025-01-01

## 2025-01-01 RX ADMIN — BENZONATATE 100 MG: 100 CAPSULE ORAL at 12:17

## 2025-01-01 ASSESSMENT — PAIN SCALES - GENERAL: PAINLEVEL_OUTOF10: 10

## 2025-01-01 ASSESSMENT — PAIN DESCRIPTION - LOCATION: LOCATION: CHEST

## 2025-01-01 NOTE — DISCHARGE INSTRUCTIONS
You presented to the ED with nasal congestion and cough for several days as well as some chest pain.  Workup here in the ED is reassuring.  No signs of heart attack.  No signs of dehydration or electrolyte abnormalities.  No signs of COVID or flu.  No pneumonia.  Recommend continued over-the-counter symptomatic management of upper respiratory symptoms.

## 2025-01-01 NOTE — ED TRIAGE NOTES
Patient arrives with cc of chest pain, chest congestion, and nasal congestion. Arrives via wheelchair, [employer], and pov. Patient reports she is hard of hearing.

## 2025-01-06 NOTE — ED PROVIDER NOTES
EMERGENCY DEPARTMENT PHYSICIAN NOTE     Patient: Dilcia Benson     Time of Service: 2025  9:30 AM     Chief complaint:   Chief Complaint   Patient presents with    Cough    Nasal Congestion        HISTORY:  Patient is a 59 y.o. female who presents to the emergency department with complaints of nasal congestion, cough, chest congestion and chest pain.  Vital signs are stable and patient is afebrile.      Past Medical History:   Diagnosis Date    Cerebral hemorrhage at birth     COVID-19 2022    Diverticulosis     GERD (gastroesophageal reflux disease)     Hearing loss     Hydrocephalus, congenital (HCC)     Hypothyroidism 2011    Impacted cerumen     Leiomyoma of uterus, unspecified     Memory disorder     Per PT    Migraine     Mild intellectual disabilities     Myopia     OCD (obsessive compulsive disorder) 2012    Pneumothorax of      Routine gynecological examination     Dr. Sood    Unspecified fetal growth retardation, 1,250-1,499 grams     Unspecified hearing loss     Vertigo     Vitamin D deficiency 2011     (ventriculoperitoneal) shunt status         Past Surgical History:   Procedure Laterality Date    COLONOSCOPY      COLONOSCOPY N/A 2018    COLONOSCOPY performed by Juanito Arce MD at [facility] ENDOSCOPY    GYN  [id number]    fibroid embolization    OTHER SURGICAL HISTORY  472475     shunt insertion    OTHER SURGICAL HISTORY  271097    shunt replacement    OTHER SURGICAL HISTORY  748945    shunt revision    OTHER SURGICAL HISTORY  [id number]    shunt replacement        Family History   Problem Relation Age of Onset    Heart Attack Father         Social History     Socioeconomic History    Marital status: Single     Spouse name: None    Number of children: None    Years of education: None    Highest education level: None   Tobacco Use    Smoking status: Never    Smokeless tobacco: Never   Vaping Use    Vaping status: Never Used   Substance and Sexual

## 2025-03-10 RX ORDER — LEVOTHYROXINE SODIUM 75 UG/1
75 TABLET ORAL EVERY OTHER DAY
Qty: 45 TABLET | Refills: 0 | Status: SHIPPED | OUTPATIENT
Start: 2025-03-10

## 2025-03-19 ENCOUNTER — OFFICE VISIT (OUTPATIENT)
Age: 60
End: 2025-03-19
Payer: MEDICARE

## 2025-03-19 VITALS
WEIGHT: 166 LBS | OXYGEN SATURATION: 98 % | DIASTOLIC BLOOD PRESSURE: 70 MMHG | SYSTOLIC BLOOD PRESSURE: 132 MMHG | BODY MASS INDEX: 30.36 KG/M2 | HEART RATE: 78 BPM

## 2025-03-19 DIAGNOSIS — Q03.9 CONGENITAL HYDROCEPHALUS: ICD-10-CM

## 2025-03-19 DIAGNOSIS — H53.8 BLURRED VISION: ICD-10-CM

## 2025-03-19 DIAGNOSIS — Z98.2 S/P VP SHUNT: ICD-10-CM

## 2025-03-19 DIAGNOSIS — F07.81 POST CONCUSSION SYNDROME: Primary | ICD-10-CM

## 2025-03-19 PROCEDURE — 99214 OFFICE O/P EST MOD 30 MIN: CPT | Performed by: PSYCHIATRY & NEUROLOGY

## 2025-03-19 PROCEDURE — 3017F COLORECTAL CA SCREEN DOC REV: CPT | Performed by: PSYCHIATRY & NEUROLOGY

## 2025-03-19 PROCEDURE — G8427 DOCREV CUR MEDS BY ELIG CLIN: HCPCS | Performed by: PSYCHIATRY & NEUROLOGY

## 2025-03-19 PROCEDURE — 1036F TOBACCO NON-USER: CPT | Performed by: PSYCHIATRY & NEUROLOGY

## 2025-03-19 PROCEDURE — G8417 CALC BMI ABV UP PARAM F/U: HCPCS | Performed by: PSYCHIATRY & NEUROLOGY

## 2025-03-19 NOTE — PROGRESS NOTES
Patient ID:  Dilcia Benson  [id number]  1965        Ms. Benson is here for follow up today of  Chief Complaint   Patient presents with    OTHER     Pt returning however for a different reason Pt is reporting blurred vision which comes and goes  saw her eye Dr. Cornejo  who referred her to Dr. Byrd .           Last Appointment With Me:  2023    H/O L parietal SDH with congenital hydrocephalus s/p VPS  Interval History:   Pt returns for delayed f/u due to vision changes when reading. She feels there is a shadow underneath the letters she is reading. This occurs only when reading. Objects appear more blurred. Symptoms are intermittent. Denies diplopia. She did see Ophthalmology recently who did not see anything abnormal with her vision.   3/4/25 she reports hitting her head on a cabinet door without LOC. She had a severe headache x 3 days with rather constant headache thereafter. She reports blurred vision also started after this time.      PMHx/ PSHx/ FHx/ SHx:  Reviewed and unchanged previous visit.   Past Medical History:   Diagnosis Date    Cerebral hemorrhage at birth (HCC)     COVID-19 2022    Diverticulosis     GERD (gastroesophageal reflux disease)     Hearing loss     Hydrocephalus, congenital ([facility])     Hypothyroidism 2011    Impacted cerumen     Leiomyoma of uterus, unspecified     Memory disorder     Per PT    Migraine     Mild intellectual disabilities     Myopia     OCD (obsessive compulsive disorder) 2012    Pneumothorax of      Routine gynecological examination     Dr. Sood    Unspecified fetal growth retardation, 1,250-1,499 grams     Unspecified hearing loss     Vertigo     Vitamin D deficiency 2011     (ventriculoperitoneal) shunt status        ROS:  Comprehensive review of systems negative except for as noted above.              Objective:       Meds:  Current Outpatient Medications   Medication Sig Dispense Refill    levothyroxine (SYNTHROID) 75

## 2025-03-28 ENCOUNTER — HOSPITAL ENCOUNTER (OUTPATIENT)
Facility: HOSPITAL | Age: 60
Discharge: HOME | End: 2025-03-31
Attending: PSYCHIATRY & NEUROLOGY
Payer: MEDICARE

## 2025-03-28 DIAGNOSIS — F07.81 POST CONCUSSION SYNDROME: ICD-10-CM

## 2025-03-28 DIAGNOSIS — Q03.9 CONGENITAL HYDROCEPHALUS: ICD-10-CM

## 2025-03-28 DIAGNOSIS — H53.8 BLURRED VISION: ICD-10-CM

## 2025-03-28 DIAGNOSIS — Z98.2 S/P VP SHUNT: ICD-10-CM

## 2025-03-28 PROCEDURE — 70450 CT HEAD/BRAIN W/O DYE: CPT

## 2025-04-02 ENCOUNTER — RESULTS FOLLOW-UP (OUTPATIENT)
Age: 60
End: 2025-04-02

## 2025-04-02 NOTE — TELEPHONE ENCOUNTER
Called the Pt and spoke with her regarding the below: per Dr. Byrd:    CT head appear stable in comparison to last imaging, no acute pathology identified. RMKORI     Her questions is what does she do next regarding her blurry vision.

## 2025-04-02 NOTE — TELEPHONE ENCOUNTER
----- Message from Dr. Debi Byrd DO sent at 4/2/2025  1:49 PM EDT -----  CT head appear stable in comparison to last imaging, no acute pathology identified. D  ----- Message -----  From: Crystal Wakefield Incoming Orders Results To [facility]  Sent: 3/30/2025   7:18 AM EDT  To: Debi Byrd DO

## 2025-04-04 NOTE — TELEPHONE ENCOUNTER
----- Message from Dr. Debi Byrd DO sent at 4/3/2025 11:09 AM EDT -----  As discussed, with post concussion symptoms this may take several more weeks to improve. RMD  ----- Message -----  From: Carmita Davey LPN  Sent: 4/2/2025   4:22 PM EDT  To: Debi Byrd DO

## 2025-04-04 NOTE — TELEPHONE ENCOUNTER
Called the Pt however had to leave a voice mail with the following information. Per Dr. Byrd:    As discussed, with post concussion symptoms this may take several more weeks to improve. QIAN

## 2025-04-14 ENCOUNTER — OFFICE VISIT (OUTPATIENT)
Dept: PRIMARY CARE CLINIC | Facility: CLINIC | Age: 60
End: 2025-04-14
Payer: MEDICARE

## 2025-04-14 VITALS
RESPIRATION RATE: 18 BRPM | WEIGHT: 166 LBS | SYSTOLIC BLOOD PRESSURE: 126 MMHG | BODY MASS INDEX: 30.55 KG/M2 | OXYGEN SATURATION: 98 % | HEART RATE: 96 BPM | HEIGHT: 62 IN | DIASTOLIC BLOOD PRESSURE: 87 MMHG | TEMPERATURE: 97.8 F

## 2025-04-14 DIAGNOSIS — G91.0 COMMUNICATING HYDROCEPHALUS (HCC): ICD-10-CM

## 2025-04-14 DIAGNOSIS — E78.2 MIXED HYPERLIPIDEMIA: ICD-10-CM

## 2025-04-14 DIAGNOSIS — E03.9 HYPOTHYROIDISM, UNSPECIFIED TYPE: ICD-10-CM

## 2025-04-14 DIAGNOSIS — G91.9 HYDROCEPHALUS WITH OPERATING SHUNT (HCC): Primary | ICD-10-CM

## 2025-04-14 DIAGNOSIS — K21.9 GASTROESOPHAGEAL REFLUX DISEASE, UNSPECIFIED WHETHER ESOPHAGITIS PRESENT: ICD-10-CM

## 2025-04-14 PROCEDURE — G8417 CALC BMI ABV UP PARAM F/U: HCPCS | Performed by: FAMILY MEDICINE

## 2025-04-14 PROCEDURE — 1036F TOBACCO NON-USER: CPT | Performed by: FAMILY MEDICINE

## 2025-04-14 PROCEDURE — G8427 DOCREV CUR MEDS BY ELIG CLIN: HCPCS | Performed by: FAMILY MEDICINE

## 2025-04-14 PROCEDURE — 99214 OFFICE O/P EST MOD 30 MIN: CPT | Performed by: FAMILY MEDICINE

## 2025-04-14 PROCEDURE — 3017F COLORECTAL CA SCREEN DOC REV: CPT | Performed by: FAMILY MEDICINE

## 2025-04-14 RX ORDER — ROSUVASTATIN CALCIUM 5 MG/1
5 TABLET, COATED ORAL NIGHTLY
Qty: 10 TABLET | Refills: 0 | Status: SHIPPED | OUTPATIENT
Start: 2025-04-14

## 2025-04-14 RX ORDER — LEVOTHYROXINE SODIUM 75 UG/1
75 TABLET ORAL EVERY OTHER DAY
Qty: 10 TABLET | Refills: 0 | Status: SHIPPED | OUTPATIENT
Start: 2025-04-14 | End: 2025-04-16 | Stop reason: DRUGHIGH

## 2025-04-14 RX ORDER — OMEPRAZOLE 20 MG/1
20 TABLET, DELAYED RELEASE ORAL DAILY
Qty: 10 TABLET | Refills: 0 | Status: SHIPPED | OUTPATIENT
Start: 2025-04-14

## 2025-04-14 SDOH — ECONOMIC STABILITY: FOOD INSECURITY: WITHIN THE PAST 12 MONTHS, YOU WORRIED THAT YOUR FOOD WOULD RUN OUT BEFORE YOU GOT MONEY TO BUY MORE.: NEVER TRUE

## 2025-04-14 SDOH — ECONOMIC STABILITY: FOOD INSECURITY: WITHIN THE PAST 12 MONTHS, THE FOOD YOU BOUGHT JUST DIDN'T LAST AND YOU DIDN'T HAVE MONEY TO GET MORE.: NEVER TRUE

## 2025-04-14 ASSESSMENT — PATIENT HEALTH QUESTIONNAIRE - PHQ9
8. MOVING OR SPEAKING SO SLOWLY THAT OTHER PEOPLE COULD HAVE NOTICED. OR THE OPPOSITE, BEING SO FIGETY OR RESTLESS THAT YOU HAVE BEEN MOVING AROUND A LOT MORE THAN USUAL: NOT AT ALL
SUM OF ALL RESPONSES TO PHQ QUESTIONS 1-9: 6
6. FEELING BAD ABOUT YOURSELF - OR THAT YOU ARE A FAILURE OR HAVE LET YOURSELF OR YOUR FAMILY DOWN: NOT AT ALL
SUM OF ALL RESPONSES TO PHQ QUESTIONS 1-9: 6
9. THOUGHTS THAT YOU WOULD BE BETTER OFF DEAD, OR OF HURTING YOURSELF: NOT AT ALL
3. TROUBLE FALLING OR STAYING ASLEEP: NOT AT ALL
5. POOR APPETITE OR OVEREATING: NOT AT ALL
1. LITTLE INTEREST OR PLEASURE IN DOING THINGS: NEARLY EVERY DAY
10. IF YOU CHECKED OFF ANY PROBLEMS, HOW DIFFICULT HAVE THESE PROBLEMS MADE IT FOR YOU TO DO YOUR WORK, TAKE CARE OF THINGS AT HOME, OR GET ALONG WITH OTHER PEOPLE: NOT DIFFICULT AT ALL
7. TROUBLE CONCENTRATING ON THINGS, SUCH AS READING THE NEWSPAPER OR WATCHING TELEVISION: NOT AT ALL
4. FEELING TIRED OR HAVING LITTLE ENERGY: NOT AT ALL
2. FEELING DOWN, DEPRESSED OR HOPELESS: NEARLY EVERY DAY

## 2025-04-14 NOTE — PROGRESS NOTES
Health Decision Maker has been checked with the patient      AI form NOT signed    Chief Complaint   Patient presents with    New Patient       \"Have you been to the ER, urgent care clinic since your last visit?  Hospitalized since your last visit?\"    YES - When: approximately 3 months ago.  Where and Why: [facility] .    “Have you seen or consulted any other health care providers outside of [facility] since your last visit?”    NO      Vitals:    04/14/25 1305   Resp: 18   Temp: 97.8 °F (36.6 °C)   TempSrc: Oral   Height: 1.575 m (5' 2\")      Depression: At risk (4/14/2025)    PHQ-2     PHQ-2 Score: 6       “Have you had a pap smear?”    NO    Date of last Cervical Cancer screen (HPV or PAP): 8/23/2017             Click Here for Release of Records Request    Chart reviewed: immunizations are documented.   Immunization History   Administered Date(s) Administered    COVID-19, PFIZER PURPLE top, DILUTE for use, (age 12 y+), 30mcg/0.3mL 01/24/2021, 02/15/2021, 11/05/2021    Influenza Virus Vaccine 10/15/2014, 09/22/2017, 10/14/2018, 10/01/2021    Influenza, AFLURIA (age 3 y+), FLUZONE, (age 6 mo+), Quadv MDV, 0.5mL 09/30/2023    Influenza, FLUARIX, FLULAVAL, FLUZONE (age 6 mo+) and AFLURIA, (age 3 y+), Quadv PF, 0.5mL 10/05/2018, 10/01/2019, 09/28/2020    Influenza, FLUZONE High Dose, (age 65 y+), IM, Trivalent PF, 0.5mL 09/22/2015, 09/15/2016    Pneumococcal, PCV-13, PREVNAR 13, (age 6w+), IM, 0.5mL 12/11/2019    Pneumococcal, PPSV23, PNEUMOVAX 23, (age 2y+), SC/IM, 0.5mL 12/22/2020    TDaP, ADACEL (age 10y-64y), BOOSTRIX (age 10y+), IM, 0.5mL 05/11/2011

## 2025-04-14 NOTE — PROGRESS NOTES
HPI     Chief Complaint   Patient presents with    New Patient     She is a 59 y.o. female who presents for [facility] care.     Previously followed by Dr. Tucker.     Hypothyroidism - On Synthroid 75mcg daily.    HLD - On Crestor 5mg daily.    GERD - On Omeprazole 20mg daily.     Hydrocephalus with shunt - Followed by Neuro. Had previous neuropsych testing done that did not show dementia/ memory impairment per Dr. Byrd.     Reviewed PmHx, RxHx, FmHx, SocHx, AllgHx and updated and dated in the chart.    Physical Exam:  /87   Pulse 96   Temp 97.8 °F (36.6 °C) (Oral)   Resp 18   Ht 1.575 m (5' 2\")   Wt 75.3 kg (166 lb)   SpO2 98%   BMI 30.36 kg/m²   Physical Exam  Vitals and nursing note reviewed.   Constitutional:       General: She is not in acute distress.     Appearance: Normal appearance. She is not ill-appearing.   Cardiovascular:      Rate and Rhythm: Normal rate and regular rhythm.      Heart sounds: No murmur heard.  Pulmonary:      Effort: Pulmonary effort is normal. No respiratory distress.      Breath sounds: Normal breath sounds. No wheezing, rhonchi or rales.   Neurological:      Mental Status: She is alert.      Comments: In wheelchair at baseline. Sitting in chair for exam today. Speaks in complete sentences.        No results found for this or any previous visit (from the past 12 hours).       Assessment / Plan     Dilcia was seen today for new patient.    Diagnoses and all orders for this visit:    Hydrocephalus with operating shunt (HCC)    Mixed hyperlipidemia  -     Lipid Panel; Future  -     Hepatic Function Panel; Future  -     rosuvastatin (CRESTOR) 5 MG tablet; Take 1 tablet by mouth nightly    Communicating hydrocephalus (HCC)    Hypothyroidism, unspecified type  -     TSH; Future  -     levothyroxine (SYNTHROID) 75 MCG tablet; Take 1 tablet by mouth every other day Takes 75mcg every other day.    Gastroesophageal reflux disease, unspecified whether esophagitis

## 2025-04-16 ENCOUNTER — RESULTS FOLLOW-UP (OUTPATIENT)
Dept: PRIMARY CARE CLINIC | Facility: CLINIC | Age: 60
End: 2025-04-16

## 2025-04-16 DIAGNOSIS — E03.9 HYPOTHYROIDISM, UNSPECIFIED TYPE: ICD-10-CM

## 2025-04-16 DIAGNOSIS — E78.2 MIXED HYPERLIPIDEMIA: ICD-10-CM

## 2025-04-16 LAB
ALBUMIN SERPL-MCNC: 3.6 G/DL (ref 3.5–5)
ALBUMIN/GLOB SERPL: 1 (ref 1.1–2.2)
ALP SERPL-CCNC: 100 U/L (ref 45–117)
ALT SERPL-CCNC: 22 U/L (ref 12–78)
AST SERPL-CCNC: 8 U/L (ref 15–37)
BILIRUB DIRECT SERPL-MCNC: <0.1 MG/DL (ref 0–0.2)
BILIRUB SERPL-MCNC: 0.3 MG/DL (ref 0.2–1)
CHOLEST SERPL-MCNC: 141 MG/DL
GLOBULIN SER CALC-MCNC: 3.5 G/DL (ref 2–4)
HDLC SERPL-MCNC: 52 MG/DL
HDLC SERPL: 2.7 (ref 0–5)
LDLC SERPL CALC-MCNC: 67.6 MG/DL (ref 0–100)
PROT SERPL-MCNC: 7.1 G/DL (ref 6.4–8.2)
TRIGL SERPL-MCNC: 107 MG/DL
TSH SERPL DL<=0.05 MIU/L-ACNC: <0.01 UIU/ML (ref 0.36–3.74)
VLDLC SERPL CALC-MCNC: 21.4 MG/DL

## 2025-04-16 RX ORDER — LEVOTHYROXINE SODIUM 50 UG/1
50 TABLET ORAL EVERY OTHER DAY
Qty: 30 TABLET | Refills: 0 | Status: SHIPPED | OUTPATIENT
Start: 2025-04-16

## 2025-06-23 RX ORDER — LEVOTHYROXINE SODIUM 75 UG/1
TABLET ORAL
Qty: 45 TABLET | Refills: 0 | OUTPATIENT
Start: 2025-06-23

## 2025-07-21 ENCOUNTER — APPOINTMENT (OUTPATIENT)
Facility: HOSPITAL | Age: 60
End: 2025-07-21
Payer: MEDICARE

## 2025-07-21 ENCOUNTER — HOSPITAL ENCOUNTER (EMERGENCY)
Facility: HOSPITAL | Age: 60
Discharge: HOME | End: 2025-07-21
Attending: EMERGENCY MEDICINE
Payer: MEDICARE

## 2025-07-21 VITALS
WEIGHT: 164.24 LBS | HEIGHT: 62 IN | HEART RATE: 82 BPM | TEMPERATURE: 97.6 F | BODY MASS INDEX: 30.22 KG/M2 | DIASTOLIC BLOOD PRESSURE: 70 MMHG | OXYGEN SATURATION: 98 % | SYSTOLIC BLOOD PRESSURE: 120 MMHG | RESPIRATION RATE: 16 BRPM

## 2025-07-21 DIAGNOSIS — K20.90 ESOPHAGITIS: ICD-10-CM

## 2025-07-21 DIAGNOSIS — R10.84 GENERALIZED ABDOMINAL PAIN: Primary | ICD-10-CM

## 2025-07-21 DIAGNOSIS — K80.20 CALCULUS OF GALLBLADDER WITHOUT CHOLECYSTITIS WITHOUT OBSTRUCTION: ICD-10-CM

## 2025-07-21 LAB
ALBUMIN SERPL-MCNC: 3.5 G/DL (ref 3.5–5)
ALBUMIN/GLOB SERPL: 0.9 (ref 1.1–2.2)
ALP SERPL-CCNC: 108 U/L (ref 45–117)
ALT SERPL-CCNC: 33 U/L (ref 12–78)
ANION GAP SERPL CALC-SCNC: 6 MMOL/L (ref 2–12)
APPEARANCE UR: CLEAR
AST SERPL-CCNC: 5 U/L (ref 15–37)
BACTERIA URNS QL MICRO: NEGATIVE /HPF
BASOPHILS # BLD: 0.02 K/UL (ref 0–0.1)
BASOPHILS NFR BLD: 0.4 % (ref 0–1)
BILIRUB SERPL-MCNC: 0.2 MG/DL (ref 0.2–1)
BILIRUB UR QL: NEGATIVE
BUN SERPL-MCNC: 10 MG/DL (ref 6–20)
BUN/CREAT SERPL: 15 (ref 12–20)
CALCIUM SERPL-MCNC: 9.7 MG/DL (ref 8.5–10.1)
CHLORIDE SERPL-SCNC: 110 MMOL/L (ref 97–108)
CO2 SERPL-SCNC: 24 MMOL/L (ref 21–32)
COLOR UR: NORMAL
COMMENT:: NORMAL
CREAT SERPL-MCNC: 0.66 MG/DL (ref 0.55–1.02)
DIFFERENTIAL METHOD BLD: ABNORMAL
EOSINOPHIL # BLD: 0.21 K/UL (ref 0–0.4)
EOSINOPHIL NFR BLD: 4.4 % (ref 0–7)
EPITH CASTS URNS QL MICRO: NORMAL /LPF
ERYTHROCYTE [DISTWIDTH] IN BLOOD BY AUTOMATED COUNT: 11 % (ref 11.5–14.5)
GLOBULIN SER CALC-MCNC: 3.8 G/DL (ref 2–4)
GLUCOSE SERPL-MCNC: 87 MG/DL (ref 65–100)
GLUCOSE UR STRIP.AUTO-MCNC: NEGATIVE MG/DL
HCT VFR BLD AUTO: 38.8 % (ref 35–47)
HGB BLD-MCNC: 12.2 G/DL (ref 11.5–16)
HGB UR QL STRIP: NEGATIVE
HYALINE CASTS URNS QL MICRO: NORMAL /LPF (ref 0–5)
IMM GRANULOCYTES # BLD AUTO: 0.01 K/UL (ref 0–0.04)
IMM GRANULOCYTES NFR BLD AUTO: 0.2 % (ref 0–0.5)
KETONES UR QL STRIP.AUTO: NEGATIVE MG/DL
LEUKOCYTE ESTERASE UR QL STRIP.AUTO: NEGATIVE
LIPASE SERPL-CCNC: 30 U/L (ref 13–75)
LYMPHOCYTES # BLD: 1.48 K/UL (ref 0.8–3.5)
LYMPHOCYTES NFR BLD: 31 % (ref 12–49)
MCH RBC QN AUTO: 28.8 PG (ref 26–34)
MCHC RBC AUTO-ENTMCNC: 31.4 G/DL (ref 30–36.5)
MCV RBC AUTO: 91.7 FL (ref 80–99)
MONOCYTES # BLD: 0.43 K/UL (ref 0–1)
MONOCYTES NFR BLD: 9 % (ref 5–13)
NEUTS SEG # BLD: 2.62 K/UL (ref 1.8–8)
NEUTS SEG NFR BLD: 55 % (ref 32–75)
NITRITE UR QL STRIP.AUTO: NEGATIVE
NRBC # BLD: 0 K/UL (ref 0–0.01)
NRBC BLD-RTO: 0 PER 100 WBC
PH UR STRIP: 5.5 (ref 5–8)
PLATELET # BLD AUTO: 312 K/UL (ref 150–400)
PMV BLD AUTO: 10.3 FL (ref 8.9–12.9)
POTASSIUM SERPL-SCNC: 3.5 MMOL/L (ref 3.5–5.1)
PROT SERPL-MCNC: 7.3 G/DL (ref 6.4–8.2)
PROT UR STRIP-MCNC: NEGATIVE MG/DL
RBC # BLD AUTO: 4.23 M/UL (ref 3.8–5.2)
RBC #/AREA URNS HPF: NORMAL /HPF (ref 0–5)
SODIUM SERPL-SCNC: 140 MMOL/L (ref 136–145)
SP GR UR REFRACTOMETRY: 1.01 (ref 1–1.03)
SPECIMEN HOLD: NORMAL
TROPONIN I SERPL HS-MCNC: 6 NG/L (ref 0–51)
UROBILINOGEN UR QL STRIP.AUTO: 0.2 EU/DL (ref 0.2–1)
WBC # BLD AUTO: 4.8 K/UL (ref 3.6–11)
WBC URNS QL MICRO: NORMAL /HPF (ref 0–4)

## 2025-07-21 PROCEDURE — 6360000004 HC RX CONTRAST MEDICATION: Performed by: RADIOLOGY

## 2025-07-21 PROCEDURE — 93005 ELECTROCARDIOGRAM TRACING: CPT | Performed by: PHYSICIAN ASSISTANT

## 2025-07-21 PROCEDURE — 74177 CT ABD & PELVIS W/CONTRAST: CPT

## 2025-07-21 PROCEDURE — 99285 EMERGENCY DEPT VISIT HI MDM: CPT

## 2025-07-21 PROCEDURE — 6360000002 HC RX W HCPCS: Performed by: PHYSICIAN ASSISTANT

## 2025-07-21 PROCEDURE — 81001 URINALYSIS AUTO W/SCOPE: CPT

## 2025-07-21 PROCEDURE — 83690 ASSAY OF LIPASE: CPT

## 2025-07-21 PROCEDURE — 96374 THER/PROPH/DIAG INJ IV PUSH: CPT

## 2025-07-21 PROCEDURE — 80053 COMPREHEN METABOLIC PANEL: CPT

## 2025-07-21 PROCEDURE — 84484 ASSAY OF TROPONIN QUANT: CPT

## 2025-07-21 PROCEDURE — 6370000000 HC RX 637 (ALT 250 FOR IP): Performed by: PHYSICIAN ASSISTANT

## 2025-07-21 PROCEDURE — 85025 COMPLETE CBC W/AUTO DIFF WBC: CPT

## 2025-07-21 RX ORDER — IOPAMIDOL 755 MG/ML
100 INJECTION, SOLUTION INTRAVASCULAR
Status: COMPLETED | OUTPATIENT
Start: 2025-07-21 | End: 2025-07-21

## 2025-07-21 RX ORDER — ONDANSETRON 2 MG/ML
4 INJECTION INTRAMUSCULAR; INTRAVENOUS ONCE
Status: COMPLETED | OUTPATIENT
Start: 2025-07-21 | End: 2025-07-21

## 2025-07-21 RX ORDER — SUCRALFATE 1 G/1
1 TABLET ORAL 3 TIMES DAILY
Qty: 42 TABLET | Refills: 0 | Status: SHIPPED | OUTPATIENT
Start: 2025-07-21 | End: 2025-08-04

## 2025-07-21 RX ORDER — FAMOTIDINE 20 MG/1
20 TABLET, FILM COATED ORAL 2 TIMES DAILY
Qty: 30 TABLET | Refills: 0 | Status: SHIPPED | OUTPATIENT
Start: 2025-07-21 | End: 2025-08-05

## 2025-07-21 RX ADMIN — IOPAMIDOL 100 ML: 755 INJECTION, SOLUTION INTRAVENOUS at 14:22

## 2025-07-21 RX ADMIN — LIDOCAINE HYDROCHLORIDE 40 ML: 20 SOLUTION ORAL at 15:24

## 2025-07-21 RX ADMIN — ONDANSETRON 4 MG: 2 INJECTION, SOLUTION INTRAMUSCULAR; INTRAVENOUS at 15:24

## 2025-07-21 ASSESSMENT — PAIN DESCRIPTION - ONSET: ONSET: ON-GOING

## 2025-07-21 ASSESSMENT — PAIN DESCRIPTION - FREQUENCY: FREQUENCY: CONTINUOUS

## 2025-07-21 ASSESSMENT — PAIN DESCRIPTION - ORIENTATION: ORIENTATION: UPPER

## 2025-07-21 ASSESSMENT — PAIN DESCRIPTION - DESCRIPTORS: DESCRIPTORS: BURNING

## 2025-07-21 ASSESSMENT — PAIN - FUNCTIONAL ASSESSMENT
PAIN_FUNCTIONAL_ASSESSMENT: ACTIVITIES ARE NOT PREVENTED
PAIN_FUNCTIONAL_ASSESSMENT: 0-10
PAIN_FUNCTIONAL_ASSESSMENT: 0-10

## 2025-07-21 ASSESSMENT — PAIN SCALES - GENERAL
PAINLEVEL_OUTOF10: 8
PAINLEVEL_OUTOF10: 0

## 2025-07-21 ASSESSMENT — PAIN DESCRIPTION - PAIN TYPE: TYPE: ACUTE PAIN

## 2025-07-21 ASSESSMENT — PAIN DESCRIPTION - LOCATION: LOCATION: THROAT

## 2025-07-21 NOTE — DISCHARGE INSTRUCTIONS
Return to the ER for new or worsening symptoms such as severe pain, fever, vomiting, bloody stools.  As we discussed, some of your symptoms are likely due to acid in the esophagus and the stomach.  You have been written for 2 medications to treat this.  Your CT scan also showed gallstones-thankfully, there are no findings today that indicate that this is a surgical emergency, but you have been given the information to follow-up with the general surgery clinic.  Low-fat diet until you are able to see them.

## 2025-07-21 NOTE — ED PROVIDER NOTES
Vitals:    Vitals:    07/21/25 1113 07/21/25 1815   BP: 125/78 120/70   Pulse: 89 82   Resp: 18 16   Temp: 98.2 °F (36.8 °C) 97.6 °F (36.4 °C)   TempSrc: Tympanic Oral   SpO2: 95% 98%   Weight: 74.5 kg (164 lb 3.9 oz)    Height: 1.575 m (5' 2\")            Medical Decision Making  59-year-old female presenting to the ED for abdominal pain, reports chronic abdominal pain, history of gastritis, esophagitis, but not taking any antacid medication.  Given worsening pain, will check labs, UA, CT scan.    Overall reassuring labs.  CT scan showing gallstones, but no findings concerning for cholecystitis or choledocholithiasis.  Patient with chief complaint of esophagitis, notes that is the most painful thing she is dealing with now.  Had lengthy discussion with patient that given that she has a known history of gastritis and esophagitis, this certainly could be partially due to that especially given that it has been untreated.  Recommended starting Pepcid, patient agreeable to this, will also treat with Carafate.  Discussed gallstones noted on CT-patient with no focal right upper quadrant pain or tenderness at this time to suggest acute cholecystitis.  Patient counseled on low-fat diet and general surgery follow-up for possible outpatient cholecystectomy-patient given specific return precautions.    Amount and/or Complexity of Data Reviewed  Labs: ordered.  Radiology: ordered.  ECG/medicine tests: ordered.    Risk  Prescription drug management.            REASSESSMENT            CONSULTS:  IP CONSULT TO CASE MANAGEMENT    PROCEDURES:  Unless otherwise noted below, none     Procedures      FINAL IMPRESSION      1. Generalized abdominal pain    2. Esophagitis    3. Calculus of gallbladder without cholecystitis without obstruction          DISPOSITION/PLAN   DISPOSITION Decision To Discharge 07/21/2025 05:57:24 PM      PATIENT REFERRED TO:  [facility]  [address] [place] [address]

## 2025-07-21 NOTE — CARE COORDINATION
ED Care Management - Received consult from patient's RN to request wheelchair transportation for patient. RN confirmed patient's address to be [address]      CM set up [facility] van for 19:15. Facesheet given to nurse for .     CONOR Coyne    [facility]    or by [employer]

## 2025-07-21 NOTE — ED TRIAGE NOTES
Patient reports she is here today for abdominal pain and \"burning esophagitis\" this is normal for her but she reports her pain is worse now a 8/10 usually a 6/10. Denies vomiting, diarrhea and fevers.     Very hard of hearing.     Patient in wheelchair but can stand on scale and transfer without assistance.

## 2025-07-21 NOTE — DISCHARGE INSTR - COC
Continuity of Care Form    Patient Name: Dilcia Benson   :  1965  MRN:  [id number]    Admit date:  2025  Discharge date:  ***    Code Status Order: No Order   Advance Directives:     Admitting Physician:  No admitting provider for patient encounter.  PCP: Denisa Sutherland DO    Discharging Nurse: ***  Discharging Hospital Unit/Room#: R17/R41  Discharging Unit Phone Number: ***    Emergency Contact:   Extended Emergency Contact Information  Primary Emergency Contact: Stephani Chavez  Mobile Phone: [phone]  Relation: Pt Representative    Past Surgical History:  Past Surgical History:   Procedure Laterality Date    COLONOSCOPY      COLONOSCOPY N/A 2018    COLONOSCOPY performed by Juanito Arce MD at [facility] END[facility]COPY    GYN  [id number]    fibroid embolization    OTHER SURGICAL HISTORY  [id number]     shunt insertion    OTHER SURGICAL HISTORY  [id number]    shunt replacement    OTHER SURGICAL HISTORY  [id number]    shunt revision    OTHER SURGICAL HISTORY  [id number]    shunt replacement       Immunization History:   Immunization History   Administered Date(s) Administered    COVID-19, PFIZER PURPLE top, DILUTE for use, (age 12 y+), 30mcg/0.3mL 2021, 02/15/2021, 2021    Influenza Virus Vaccine 10/15/2014, 2017, 10/14/2018, 10/01/2021    Influenza, AFLURIA (age 3 y+), FLUZONE, (age 6 mo+), Quadv MDV, 0.5mL 2023    Influenza, FLUARIX, FLULAVAL, FLUZONE (age 6 mo+) and AFLURIA, (age 3 y+), Quadv PF, 0.5mL 10/05/2018, 10/01/2019, 2020    Influenza, FLUZONE High Dose, (age 65 y+), IM, Trivalent PF, 0.5mL 2015, 09/15/2016    Pneumococcal, PCV-13, PREVNAR 13, (age 6w+), IM, 0.5mL 2019    Pneumococcal, PPSV23, PNEUMOVAX 23, (age 2y+), SC/IM, 0.5mL 2020    TDaP, ADACEL (age 10y-64y), BOOSTRIX (age 10y+), IM, 0.5mL 2011       Active Problems:  Patient Active Problem List   Diagnosis Code    Hypothyroidism E03.9    Dizziness R42    Hyperlipidemia E78.5    Anxiety

## 2025-07-23 LAB
EKG ATRIAL RATE: 95 BPM
EKG DIAGNOSIS: NORMAL
EKG P AXIS: 74 DEGREES
EKG P-R INTERVAL: 142 MS
EKG Q-T INTERVAL: 348 MS
EKG QRS DURATION: 78 MS
EKG QTC CALCULATION (BAZETT): 437 MS
EKG R AXIS: 68 DEGREES
EKG T AXIS: 56 DEGREES
EKG VENTRICULAR RATE: 95 BPM

## 2025-07-23 PROCEDURE — 93010 ELECTROCARDIOGRAM REPORT: CPT | Performed by: SPECIALIST

## 2025-07-25 ENCOUNTER — OFFICE VISIT (OUTPATIENT)
Dept: PRIMARY CARE CLINIC | Facility: CLINIC | Age: 60
End: 2025-07-25

## 2025-07-25 ENCOUNTER — TELEPHONE (OUTPATIENT)
Dept: PRIMARY CARE CLINIC | Facility: CLINIC | Age: 60
End: 2025-07-25

## 2025-07-25 VITALS
BODY MASS INDEX: 28.34 KG/M2 | HEART RATE: 100 BPM | DIASTOLIC BLOOD PRESSURE: 80 MMHG | OXYGEN SATURATION: 98 % | HEIGHT: 62 IN | WEIGHT: 154 LBS | RESPIRATION RATE: 16 BRPM | SYSTOLIC BLOOD PRESSURE: 127 MMHG | TEMPERATURE: 97.9 F

## 2025-07-25 DIAGNOSIS — K80.20 CALCULUS OF GALLBLADDER WITHOUT CHOLECYSTITIS WITHOUT OBSTRUCTION: Primary | ICD-10-CM

## 2025-07-25 DIAGNOSIS — R10.9 ABDOMINAL PAIN, UNSPECIFIED ABDOMINAL LOCATION: ICD-10-CM

## 2025-07-25 NOTE — PROGRESS NOTES
Health Decision Maker has been checked with the patient          Chief Complaint   Patient presents with    Abdominal Pain     Patient reports abdominal pain. Wants to discuss with Dr. Sutherland. Is surgery risky? And Where it would be done?       \"Have you been to the ER, urgent care clinic since your last visit?  Hospitalized since your last visit?\"    YES - When: approximately 4 days ago.  Where and Why: ER VISIT FOR GALLSTONES.    “Have you seen or consulted any other health care providers outside of [facility] since your last visit?”    NO      Vitals:    07/25/25 1609   Temp: 97.9 °F (36.6 °C)   TempSrc: Oral   Weight: 69.9 kg (154 lb)   Height: 1.575 m (5' 2\")      Depression: At risk (4/14/2025)    PHQ-2     PHQ-2 Score: 6      Have you had a mammogram?”   NO    Date of last Mammogram: 4/24/2023      “Have you had a pap smear?”    NO    Date of last Cervical Cancer screen (HPV or PAP): 8/23/2017             Click Here for Release of Records Request    Chart reviewed: immunizations are documented.   Immunization History   Administered Date(s) Administered    COVID-19, PFIZER PURPLE top, DILUTE for use, (age 12 y+), 30mcg/0.3mL 01/24/2021, 02/15/2021, 11/05/2021    Influenza Virus Vaccine 10/15/2014, 09/22/2017, 10/14/2018, 10/01/2021    Influenza, AFLURIA (age 3 y+), FLUZONE, (age 6 mo+), Quadv MDV, 0.5mL 09/30/2023    Influenza, FLUARIX, FLULAVAL, FLUZONE (age 6 mo+) and AFLURIA, (age 3 y+), Quadv PF, 0.5mL 10/05/2018, 10/01/2019, 09/28/2020    Influenza, FLUZONE High Dose, (age 65 y+), IM, Trivalent PF, 0.5mL 09/22/2015, 09/15/2016    Pneumococcal, PCV-13, PREVNAR 13, (age 6w+), IM, 0.5mL 12/11/2019    Pneumococcal, PPSV23, PNEUMOVAX 23, (age 2y+), SC/IM, 0.5mL 12/22/2020    TDaP, ADACEL (age 10y-64y), BOOSTRIX (age 10y+), IM, 0.5mL 05/11/2011

## 2025-07-25 NOTE — TELEPHONE ENCOUNTER
Tried reaching patient, left a message.  Patient was seen in the ER on 7/21 and referred to surgery. Dr. Sutherland would like for patient to see general surgery for gallstone/gallbladder concern.     Cancel appointment if patient is okay with this.

## 2025-07-25 NOTE — PROGRESS NOTES
HPI     Chief Complaint   Patient presents with    Abdominal Pain     Patient reports abdominal pain. Wants to discuss with Dr. Sutherland. Is surgery risky? And Where it would be done?     She is a 59 y.o. female who presents for abdominal pain.    She was seen in ER 7/21 for abdominal pain. Was found to have a contracted gallbladder with stones and enlarged fibroid uterus. States she is still having pain that comes and goes. Points to her LLQ. CT recently showed diverticulosis but no active infection. Appendix was normal. I told her if she was having severe pain she should go back to to the ER but she said she was not going to do that. I asked if she wanted me to order repeat imaging/ labs STAT but she declines and states she does not feel this is necessary. Offered to treat for diverticulitis but she states she doesn't want to take antibiotics because it can give her diarrhea and that she has had this discomfort before and just has to let it pass. States she wants referral to Gen Surg to get her gallbladder out and asked a lot of questions about what she can eat when it is removed.     Reviewed PmHx, RxHx, FmHx, SocHx, AllgHx and updated and dated in the chart.    Physical Exam:  /80   Pulse 100   Temp 97.9 °F (36.6 °C) (Oral)   Resp 16   Ht 1.575 m (5' 2\")   Wt 69.9 kg (154 lb)   SpO2 98%   BMI 28.17 kg/m²   Physical Exam  Vitals and nursing note reviewed.   Constitutional:       General: She is not in acute distress.     Appearance: Normal appearance. She is not ill-appearing.   Cardiovascular:      Rate and Rhythm: Normal rate and regular rhythm.      Heart sounds: No murmur heard.  Pulmonary:      Effort: Pulmonary effort is normal. No respiratory distress.      Breath sounds: Normal breath sounds. No wheezing, rhonchi or rales.   Abdominal:      General: There is no distension.      Palpations: Abdomen is soft.      Tenderness: There is abdominal tenderness. There is no guarding or rebound.

## 2025-08-04 ENCOUNTER — OFFICE VISIT (OUTPATIENT)
Age: 60
End: 2025-08-04
Payer: MEDICARE

## 2025-08-04 ENCOUNTER — TELEPHONE (OUTPATIENT)
Age: 60
End: 2025-08-04

## 2025-08-04 VITALS
BODY MASS INDEX: 30.18 KG/M2 | RESPIRATION RATE: 20 BRPM | WEIGHT: 164 LBS | TEMPERATURE: 97.7 F | DIASTOLIC BLOOD PRESSURE: 79 MMHG | OXYGEN SATURATION: 98 % | HEART RATE: 98 BPM | HEIGHT: 62 IN | SYSTOLIC BLOOD PRESSURE: 129 MMHG

## 2025-08-04 DIAGNOSIS — K81.1 CHRONIC CHOLECYSTITIS: Primary | ICD-10-CM

## 2025-08-04 PROCEDURE — 99203 OFFICE O/P NEW LOW 30 MIN: CPT | Performed by: SURGERY

## 2025-08-04 PROCEDURE — 1036F TOBACCO NON-USER: CPT | Performed by: SURGERY

## 2025-08-04 PROCEDURE — G8427 DOCREV CUR MEDS BY ELIG CLIN: HCPCS | Performed by: SURGERY

## 2025-08-04 PROCEDURE — G8417 CALC BMI ABV UP PARAM F/U: HCPCS | Performed by: SURGERY

## 2025-08-04 PROCEDURE — 3017F COLORECTAL CA SCREEN DOC REV: CPT | Performed by: SURGERY

## 2025-08-05 ENCOUNTER — ANESTHESIA EVENT (OUTPATIENT)
Facility: HOSPITAL | Age: 60
End: 2025-08-05
Payer: MEDICARE

## 2025-08-05 RX ORDER — BUPIVACAINE HYDROCHLORIDE 5 MG/ML
30 INJECTION, SOLUTION PERINEURAL ONCE
Status: CANCELLED | OUTPATIENT
Start: 2025-08-05 | End: 2025-08-05

## 2025-08-06 ENCOUNTER — ANCILLARY PROCEDURE (OUTPATIENT)
Facility: HOSPITAL | Age: 60
End: 2025-08-06
Attending: SURGERY
Payer: MEDICARE

## 2025-08-06 ENCOUNTER — ANESTHESIA (OUTPATIENT)
Facility: HOSPITAL | Age: 60
End: 2025-08-06
Payer: MEDICARE

## 2025-08-06 ENCOUNTER — HOSPITAL ENCOUNTER (OUTPATIENT)
Facility: HOSPITAL | Age: 60
Setting detail: OUTPATIENT SURGERY
Discharge: HOME | End: 2025-08-06
Attending: SURGERY | Admitting: SURGERY
Payer: MEDICARE

## 2025-08-06 VITALS
TEMPERATURE: 97.4 F | OXYGEN SATURATION: 95 % | HEART RATE: 87 BPM | SYSTOLIC BLOOD PRESSURE: 117 MMHG | DIASTOLIC BLOOD PRESSURE: 89 MMHG | RESPIRATION RATE: 18 BRPM

## 2025-08-06 DIAGNOSIS — K81.1 CHRONIC CHOLECYSTITIS: Primary | ICD-10-CM

## 2025-08-06 PROCEDURE — 6360000002 HC RX W HCPCS: Performed by: SURGERY

## 2025-08-06 PROCEDURE — 7100000001 HC PACU RECOVERY - ADDTL 15 MIN: Performed by: SURGERY

## 2025-08-06 PROCEDURE — 2580000003 HC RX 258: Performed by: ANESTHESIOLOGY

## 2025-08-06 PROCEDURE — 7100000000 HC PACU RECOVERY - FIRST 15 MIN: Performed by: SURGERY

## 2025-08-06 PROCEDURE — 7100000011 HC PHASE II RECOVERY - ADDTL 15 MIN: Performed by: SURGERY

## 2025-08-06 PROCEDURE — 3600000014 HC SURGERY LEVEL 4 ADDTL 15MIN: Performed by: SURGERY

## 2025-08-06 PROCEDURE — 3700000000 HC ANESTHESIA ATTENDED CARE: Performed by: SURGERY

## 2025-08-06 PROCEDURE — 6360000002 HC RX W HCPCS

## 2025-08-06 PROCEDURE — 2720000010 HC SURG SUPPLY STERILE: Performed by: SURGERY

## 2025-08-06 PROCEDURE — 3700000001 HC ADD 15 MINUTES (ANESTHESIA): Performed by: SURGERY

## 2025-08-06 PROCEDURE — 2580000003 HC RX 258

## 2025-08-06 PROCEDURE — 2500000003 HC RX 250 WO HCPCS

## 2025-08-06 PROCEDURE — 7100000010 HC PHASE II RECOVERY - FIRST 15 MIN: Performed by: SURGERY

## 2025-08-06 PROCEDURE — 2709999900 HC NON-CHARGEABLE SUPPLY: Performed by: SURGERY

## 2025-08-06 PROCEDURE — 3600000004 HC SURGERY LEVEL 4 BASE: Performed by: SURGERY

## 2025-08-06 RX ORDER — PHENYLEPHRINE HCL IN 0.9% NACL 0.4MG/10ML
SYRINGE (ML) INTRAVENOUS
Status: DISCONTINUED | OUTPATIENT
Start: 2025-08-06 | End: 2025-08-06 | Stop reason: SDUPTHER

## 2025-08-06 RX ORDER — BUPIVACAINE HYDROCHLORIDE 5 MG/ML
INJECTION, SOLUTION EPIDURAL; INTRACAUDAL; PERINEURAL PRN
Status: DISCONTINUED | OUTPATIENT
Start: 2025-08-06 | End: 2025-08-06 | Stop reason: HOSPADM

## 2025-08-06 RX ORDER — FENTANYL CITRATE 50 UG/ML
INJECTION, SOLUTION INTRAMUSCULAR; INTRAVENOUS
Status: DISCONTINUED | OUTPATIENT
Start: 2025-08-06 | End: 2025-08-06 | Stop reason: SDUPTHER

## 2025-08-06 RX ORDER — OXYCODONE AND ACETAMINOPHEN 5; 325 MG/1; MG/1
1 TABLET ORAL EVERY 6 HOURS PRN
Qty: 20 TABLET | Refills: 0 | Status: SHIPPED | OUTPATIENT
Start: 2025-08-06 | End: 2025-08-11

## 2025-08-06 RX ORDER — HYDRALAZINE HYDROCHLORIDE 20 MG/ML
10 INJECTION INTRAMUSCULAR; INTRAVENOUS
Status: DISCONTINUED | OUTPATIENT
Start: 2025-08-06 | End: 2025-08-06 | Stop reason: HOSPADM

## 2025-08-06 RX ORDER — LIDOCAINE HYDROCHLORIDE 10 MG/ML
1 INJECTION, SOLUTION EPIDURAL; INFILTRATION; INTRACAUDAL; PERINEURAL
Status: DISCONTINUED | OUTPATIENT
Start: 2025-08-06 | End: 2025-08-06 | Stop reason: HOSPADM

## 2025-08-06 RX ORDER — LIDOCAINE HYDROCHLORIDE 20 MG/ML
INJECTION, SOLUTION EPIDURAL; INFILTRATION; INTRACAUDAL; PERINEURAL
Status: DISCONTINUED | OUTPATIENT
Start: 2025-08-06 | End: 2025-08-06 | Stop reason: SDUPTHER

## 2025-08-06 RX ORDER — SODIUM CHLORIDE, SODIUM LACTATE, POTASSIUM CHLORIDE, CALCIUM CHLORIDE 600; 310; 30; 20 MG/100ML; MG/100ML; MG/100ML; MG/100ML
INJECTION, SOLUTION INTRAVENOUS CONTINUOUS
Status: DISCONTINUED | OUTPATIENT
Start: 2025-08-06 | End: 2025-08-06 | Stop reason: HOSPADM

## 2025-08-06 RX ORDER — ROCURONIUM BROMIDE 10 MG/ML
INJECTION, SOLUTION INTRAVENOUS
Status: DISCONTINUED | OUTPATIENT
Start: 2025-08-06 | End: 2025-08-06 | Stop reason: SDUPTHER

## 2025-08-06 RX ORDER — MIDAZOLAM HYDROCHLORIDE 1 MG/ML
INJECTION, SOLUTION INTRAMUSCULAR; INTRAVENOUS
Status: DISCONTINUED | OUTPATIENT
Start: 2025-08-06 | End: 2025-08-06 | Stop reason: SDUPTHER

## 2025-08-06 RX ORDER — KETOROLAC TROMETHAMINE 30 MG/ML
INJECTION, SOLUTION INTRAMUSCULAR; INTRAVENOUS
Status: DISCONTINUED | OUTPATIENT
Start: 2025-08-06 | End: 2025-08-06 | Stop reason: SDUPTHER

## 2025-08-06 RX ORDER — SUCCINYLCHOLINE/SOD CL,ISO/PF 200MG/10ML
SYRINGE (ML) INTRAVENOUS
Status: DISCONTINUED | OUTPATIENT
Start: 2025-08-06 | End: 2025-08-06 | Stop reason: SDUPTHER

## 2025-08-06 RX ORDER — DEXMEDETOMIDINE HYDROCHLORIDE 100 UG/ML
INJECTION, SOLUTION INTRAVENOUS
Status: DISCONTINUED | OUTPATIENT
Start: 2025-08-06 | End: 2025-08-06 | Stop reason: SDUPTHER

## 2025-08-06 RX ORDER — SODIUM CHLORIDE 9 MG/ML
INJECTION, SOLUTION INTRAVENOUS PRN
Status: DISCONTINUED | OUTPATIENT
Start: 2025-08-06 | End: 2025-08-06 | Stop reason: HOSPADM

## 2025-08-06 RX ORDER — ONDANSETRON 4 MG/1
4 TABLET, ORALLY DISINTEGRATING ORAL 3 TIMES DAILY PRN
Qty: 21 TABLET | Refills: 0 | Status: SHIPPED | OUTPATIENT
Start: 2025-08-06

## 2025-08-06 RX ORDER — SODIUM CHLORIDE 0.9 % (FLUSH) 0.9 %
5-40 SYRINGE (ML) INJECTION PRN
Status: DISCONTINUED | OUTPATIENT
Start: 2025-08-06 | End: 2025-08-06 | Stop reason: HOSPADM

## 2025-08-06 RX ORDER — OXYCODONE HYDROCHLORIDE 5 MG/1
5 TABLET ORAL
Status: DISCONTINUED | OUTPATIENT
Start: 2025-08-06 | End: 2025-08-06 | Stop reason: HOSPADM

## 2025-08-06 RX ORDER — MIDAZOLAM HYDROCHLORIDE 2 MG/2ML
2 INJECTION, SOLUTION INTRAMUSCULAR; INTRAVENOUS PRN
Status: DISCONTINUED | OUTPATIENT
Start: 2025-08-06 | End: 2025-08-06 | Stop reason: HOSPADM

## 2025-08-06 RX ORDER — PROPOFOL 10 MG/ML
INJECTION, EMULSION INTRAVENOUS
Status: DISCONTINUED | OUTPATIENT
Start: 2025-08-06 | End: 2025-08-06 | Stop reason: SDUPTHER

## 2025-08-06 RX ORDER — FENTANYL CITRATE 50 UG/ML
25 INJECTION, SOLUTION INTRAMUSCULAR; INTRAVENOUS EVERY 5 MIN PRN
Status: DISCONTINUED | OUTPATIENT
Start: 2025-08-06 | End: 2025-08-06 | Stop reason: HOSPADM

## 2025-08-06 RX ORDER — PROCHLORPERAZINE EDISYLATE 5 MG/ML
5 INJECTION INTRAMUSCULAR; INTRAVENOUS
Status: DISCONTINUED | OUTPATIENT
Start: 2025-08-06 | End: 2025-08-06 | Stop reason: HOSPADM

## 2025-08-06 RX ORDER — SODIUM CHLORIDE 0.9 % (FLUSH) 0.9 %
5-40 SYRINGE (ML) INJECTION EVERY 12 HOURS SCHEDULED
Status: DISCONTINUED | OUTPATIENT
Start: 2025-08-06 | End: 2025-08-06 | Stop reason: HOSPADM

## 2025-08-06 RX ORDER — HYDROMORPHONE HYDROCHLORIDE 1 MG/ML
0.5 INJECTION, SOLUTION INTRAMUSCULAR; INTRAVENOUS; SUBCUTANEOUS EVERY 5 MIN PRN
Status: DISCONTINUED | OUTPATIENT
Start: 2025-08-06 | End: 2025-08-06 | Stop reason: HOSPADM

## 2025-08-06 RX ORDER — ONDANSETRON 2 MG/ML
INJECTION INTRAMUSCULAR; INTRAVENOUS
Status: DISCONTINUED | OUTPATIENT
Start: 2025-08-06 | End: 2025-08-06 | Stop reason: SDUPTHER

## 2025-08-06 RX ORDER — ONDANSETRON 2 MG/ML
4 INJECTION INTRAMUSCULAR; INTRAVENOUS
Status: DISCONTINUED | OUTPATIENT
Start: 2025-08-06 | End: 2025-08-06 | Stop reason: HOSPADM

## 2025-08-06 RX ORDER — LEVOFLOXACIN 5 MG/ML
500 INJECTION, SOLUTION INTRAVENOUS
Status: COMPLETED | OUTPATIENT
Start: 2025-08-06 | End: 2025-08-06

## 2025-08-06 RX ORDER — ACETAMINOPHEN 500 MG
1000 TABLET ORAL ONCE
Status: DISCONTINUED | OUTPATIENT
Start: 2025-08-06 | End: 2025-08-06 | Stop reason: HOSPADM

## 2025-08-06 RX ORDER — FENTANYL CITRATE 50 UG/ML
100 INJECTION, SOLUTION INTRAMUSCULAR; INTRAVENOUS
Status: DISCONTINUED | OUTPATIENT
Start: 2025-08-06 | End: 2025-08-06 | Stop reason: HOSPADM

## 2025-08-06 RX ORDER — DEXAMETHASONE SODIUM PHOSPHATE 4 MG/ML
INJECTION, SOLUTION INTRA-ARTICULAR; INTRALESIONAL; INTRAMUSCULAR; INTRAVENOUS; SOFT TISSUE
Status: DISCONTINUED | OUTPATIENT
Start: 2025-08-06 | End: 2025-08-06 | Stop reason: SDUPTHER

## 2025-08-06 RX ADMIN — ROCURONIUM BROMIDE 25 MG: 10 INJECTION, SOLUTION INTRAVENOUS at 12:34

## 2025-08-06 RX ADMIN — MIDAZOLAM 2 MG: 1 INJECTION INTRAMUSCULAR; INTRAVENOUS at 12:18

## 2025-08-06 RX ADMIN — SODIUM CHLORIDE, POTASSIUM CHLORIDE, SODIUM LACTATE AND CALCIUM CHLORIDE: 600; 310; 30; 20 INJECTION, SOLUTION INTRAVENOUS at 12:10

## 2025-08-06 RX ADMIN — FENTANYL CITRATE 50 MCG: 50 INJECTION INTRAMUSCULAR; INTRAVENOUS at 12:58

## 2025-08-06 RX ADMIN — DEXMEDETOMIDINE 4 MCG: 100 INJECTION, SOLUTION INTRAVENOUS at 13:30

## 2025-08-06 RX ADMIN — ONDANSETRON 4 MG: 2 INJECTION, SOLUTION INTRAMUSCULAR; INTRAVENOUS at 13:12

## 2025-08-06 RX ADMIN — PROPOFOL 30 MG: 10 INJECTION, EMULSION INTRAVENOUS at 13:20

## 2025-08-06 RX ADMIN — SUGAMMADEX 200 MG: 100 INJECTION, SOLUTION INTRAVENOUS at 13:21

## 2025-08-06 RX ADMIN — PROPOFOL 150 MG: 10 INJECTION, EMULSION INTRAVENOUS at 12:25

## 2025-08-06 RX ADMIN — FENTANYL CITRATE 50 MCG: 50 INJECTION INTRAMUSCULAR; INTRAVENOUS at 12:25

## 2025-08-06 RX ADMIN — LIDOCAINE HYDROCHLORIDE 70 MG: 20 INJECTION, SOLUTION EPIDURAL; INFILTRATION; INTRACAUDAL; PERINEURAL at 12:25

## 2025-08-06 RX ADMIN — DEXAMETHASONE SODIUM PHOSPHATE 8 MG: 4 INJECTION INTRA-ARTICULAR; INTRALESIONAL; INTRAMUSCULAR; INTRAVENOUS; SOFT TISSUE at 12:33

## 2025-08-06 RX ADMIN — Medication 80 MCG: at 12:39

## 2025-08-06 RX ADMIN — KETOROLAC TROMETHAMINE 30 MG: 30 INJECTION, SOLUTION INTRAMUSCULAR; INTRAVENOUS at 13:12

## 2025-08-06 RX ADMIN — LEVOFLOXACIN 500 MG: 500 INJECTION, SOLUTION INTRAVENOUS at 12:33

## 2025-08-06 RX ADMIN — SODIUM CHLORIDE, POTASSIUM CHLORIDE, SODIUM LACTATE AND CALCIUM CHLORIDE: 600; 310; 30; 20 INJECTION, SOLUTION INTRAVENOUS at 12:25

## 2025-08-06 RX ADMIN — PHENYLEPHRINE HYDROCHLORIDE 30 MCG/MIN: 10 INJECTION INTRAVENOUS at 12:49

## 2025-08-06 RX ADMIN — ROCURONIUM BROMIDE 5 MG: 10 INJECTION, SOLUTION INTRAVENOUS at 12:26

## 2025-08-06 RX ADMIN — Medication 120 MG: at 12:26

## 2025-08-06 RX ADMIN — DEXMEDETOMIDINE 4 MCG: 100 INJECTION, SOLUTION INTRAVENOUS at 13:07

## 2025-08-06 ASSESSMENT — PAIN SCALES - GENERAL: PAINLEVEL_OUTOF10: 3

## 2025-08-06 ASSESSMENT — PAIN DESCRIPTION - DESCRIPTORS: DESCRIPTORS: ACHING

## 2025-08-06 ASSESSMENT — PAIN DESCRIPTION - ORIENTATION: ORIENTATION: MID

## 2025-08-06 ASSESSMENT — PAIN - FUNCTIONAL ASSESSMENT: PAIN_FUNCTIONAL_ASSESSMENT: 0-10

## 2025-08-06 ASSESSMENT — PAIN DESCRIPTION - LOCATION: LOCATION: ABDOMEN

## 2025-08-07 ENCOUNTER — TELEPHONE (OUTPATIENT)
Age: 60
End: 2025-08-07

## 2025-08-20 ENCOUNTER — OFFICE VISIT (OUTPATIENT)
Age: 60
End: 2025-08-20

## 2025-08-20 VITALS
HEIGHT: 62 IN | SYSTOLIC BLOOD PRESSURE: 105 MMHG | WEIGHT: 161.6 LBS | DIASTOLIC BLOOD PRESSURE: 70 MMHG | HEART RATE: 93 BPM | BODY MASS INDEX: 29.74 KG/M2 | OXYGEN SATURATION: 98 % | RESPIRATION RATE: 18 BRPM | TEMPERATURE: 97.6 F

## 2025-08-20 DIAGNOSIS — Z09 POSTOPERATIVE EXAMINATION: Primary | ICD-10-CM

## 2025-08-20 DIAGNOSIS — Z90.49 S/P LAPAROSCOPIC CHOLECYSTECTOMY: ICD-10-CM

## 2025-08-20 ASSESSMENT — PATIENT HEALTH QUESTIONNAIRE - PHQ9
SUM OF ALL RESPONSES TO PHQ QUESTIONS 1-9: 2
SUM OF ALL RESPONSES TO PHQ QUESTIONS 1-9: 2
1. LITTLE INTEREST OR PLEASURE IN DOING THINGS: SEVERAL DAYS
2. FEELING DOWN, DEPRESSED OR HOPELESS: SEVERAL DAYS
SUM OF ALL RESPONSES TO PHQ QUESTIONS 1-9: 2
SUM OF ALL RESPONSES TO PHQ QUESTIONS 1-9: 2

## 2025-08-23 DIAGNOSIS — E78.2 MIXED HYPERLIPIDEMIA: ICD-10-CM

## 2025-08-25 RX ORDER — ROSUVASTATIN CALCIUM 5 MG/1
5 TABLET, COATED ORAL NIGHTLY
Qty: 90 TABLET | Refills: 2 | Status: SHIPPED | OUTPATIENT
Start: 2025-08-25

## 2025-08-27 ENCOUNTER — TELEPHONE (OUTPATIENT)
Age: 60
End: 2025-08-27

## (undated) DEVICE — BW-412T DISP COMBO CLEANING BRUSH: Brand: SINGLE USE COMBINATION CLEANING BRUSH

## (undated) DEVICE — APPLIER CLP M/L SHFT DIA5MM 15 LIG LIGAMAX 5

## (undated) DEVICE — SOLIDIFIER FLUID 3000 CC ABSORB

## (undated) DEVICE — MANIFOLD SUCT 4 PRT 2 CANSTR FLTR DISP NEPTUNE 2

## (undated) DEVICE — QUILTED PREMIUM COMFORT UNDERPAD,EXTRA HEAVY: Brand: WINGS

## (undated) DEVICE — Device

## (undated) DEVICE — CONNECTOR TBNG AUX H2O JET DISP FOR OLY 160/180 SER

## (undated) DEVICE — KENDALL RADIOLUCENT FOAM MONITORING ELECTRODE -RECTANGULAR SHAPE: Brand: KENDALL

## (undated) DEVICE — ENDO CARRY-ON PROCEDURE KIT INCLUDES ENZYMATIC SPONGE, GAUZE, BIOHAZARD LABEL, TRAY, LUBRICANT, DIRTY SCOPE LABEL, WATER LABEL, TRAY, DRAWSTRING PAD, AND DEFENDO 4-PIECE KIT.: Brand: ENDO CARRY-ON PROCEDURE KIT

## (undated) DEVICE — SYSTEM EVAC SMOKE LAPARSCOPIC

## (undated) DEVICE — SOLUTION IV 1000 ML 0.9 NACL INJ USP EXCEL PLAS CONTAINER

## (undated) DEVICE — GLOVE SURG SZ 75 L1212IN FNGR THK138MIL BRN LTX FREE

## (undated) DEVICE — TROCAR LAP L100MM DIA5MM Z THRD OPT ACCS SYS KII

## (undated) DEVICE — SEALER ONE-STEP 37CM LIGASURE MARYLAND XP

## (undated) DEVICE — SYRINGE MED 20ML STD CLR PLAS LUERLOCK TIP N CTRL DISP

## (undated) DEVICE — ELECTRODE ES L33CM DIA5MM STD L HK MPLR LAP APPRCH EZ TO

## (undated) DEVICE — 1200 GUARD II KIT W/5MM TUBE W/O VAC TUBE: Brand: GUARDIAN

## (undated) DEVICE — SUTURE MONOCRYL + SZ 4-0 L27IN ABSRB UD L19MM PS-2 3/8 CIR MCP426H

## (undated) DEVICE — TROCAR LAP L100MM DIA12MM Z THRD OPT ACCS SYS KII FIOS

## (undated) DEVICE — SET ADMIN 16ML TBNG L100IN 2 Y INJ SITE IV PIGGY BK DISP

## (undated) DEVICE — SUTURE VICRYL + SZ 0 L27IN ABSRB VLT L26MM UR-6 5/8 CIR VCP603H

## (undated) DEVICE — NEEDLE HYPO 22GA L1.5IN ULT SHRP TRIBEVELED INTUITIVE 1 HND

## (undated) DEVICE — NEONATAL-ADULT SPO2 SENSOR: Brand: NELLCOR

## (undated) DEVICE — BAG BELONG PT PERS CLEAR HANDL

## (undated) DEVICE — AIRLIFE™ U/CONNECT-IT OXYGEN TUBING 7 FEET (2.1 M) CRUSH-RESISTANT OXYGEN TUBING, VINYL TIPPED: Brand: AIRLIFE™

## (undated) DEVICE — ADHESIVE SKIN CLOSURE TOP 0.8 CC PREM PUR LIQUIBAND RAPID LF

## (undated) DEVICE — COVER LT HNDL PLAS RIG 1 PER PK

## (undated) DEVICE — CATH IV AUTOGRD BC BLU 22GA 25 -- INSYTE

## (undated) DEVICE — POUCH RETREVAL 10MM INZII

## (undated) DEVICE — GARMENT COMPR M FOR 12-18IN CALF INTMIT SGL BLDR HEMO-FORCE

## (undated) DEVICE — AGENT HEMSTAT 3GM OXIDIZED REGENERATED CELOS ABSRB FOR CONT (ORDER MULTIPLES OF 5EA)

## (undated) DEVICE — Z DISCONTINUED USE 2751540 TUBING IRRIG L10IN DISP PMP ENDOGATOR

## (undated) DEVICE — SLEEVE LAP L100MM DIA5MM Z THRD KII

## (undated) DEVICE — INSERT SCIS FOR METZ CLICKLINE DBL ACT JAW CRV DISPOSABLE

## (undated) DEVICE — KIT IV STRT W CHLORAPREP PD 1ML

## (undated) DEVICE — NEEDLE HYPO 18GA L1.5IN PNK S STL HUB POLYPR SHLD REG BVL

## (undated) DEVICE — DEVICE SUT FOR TRCR SITE INCIS ENDO CLOSE

## (undated) DEVICE — SURGICEL ENDOSCP APPL

## (undated) DEVICE — Z DISCONTINUED NO SUB IDED SET EXTN W/ 4 W STPCOCK M SPIN LOK 36IN